# Patient Record
Sex: MALE | Race: BLACK OR AFRICAN AMERICAN | Employment: FULL TIME | URBAN - METROPOLITAN AREA
[De-identification: names, ages, dates, MRNs, and addresses within clinical notes are randomized per-mention and may not be internally consistent; named-entity substitution may affect disease eponyms.]

---

## 2024-01-08 ENCOUNTER — APPOINTMENT (OUTPATIENT)
Dept: GENERAL RADIOLOGY | Age: 69
DRG: 853 | End: 2024-01-08
Payer: COMMERCIAL

## 2024-01-08 ENCOUNTER — HOSPITAL ENCOUNTER (EMERGENCY)
Age: 69
Discharge: ANOTHER HOSPITAL | DRG: 853 | End: 2024-01-08
Attending: EMERGENCY MEDICINE
Payer: COMMERCIAL

## 2024-01-08 VITALS
HEART RATE: 101 BPM | OXYGEN SATURATION: 92 % | TEMPERATURE: 97.9 F | DIASTOLIC BLOOD PRESSURE: 67 MMHG | RESPIRATION RATE: 30 BRPM | SYSTOLIC BLOOD PRESSURE: 122 MMHG | BODY MASS INDEX: 37.58 KG/M2 | HEIGHT: 76 IN | WEIGHT: 308.64 LBS

## 2024-01-08 DIAGNOSIS — I47.10 PAROXYSMAL SUPRAVENTRICULAR TACHYCARDIA: Primary | ICD-10-CM

## 2024-01-08 DIAGNOSIS — R33.9 URINARY RETENTION: ICD-10-CM

## 2024-01-08 LAB
ALBUMIN SERPL-MCNC: 3.5 G/DL (ref 3.5–5.2)
ALBUMIN/GLOB SERPL: 0.6 {RATIO} (ref 1–2.5)
ALP SERPL-CCNC: 80 U/L (ref 40–129)
ALT SERPL-CCNC: 28 U/L (ref 5–41)
ANION GAP SERPL CALCULATED.3IONS-SCNC: 16 MMOL/L (ref 9–17)
AST SERPL-CCNC: 23 U/L
BASOPHILS # BLD: 0.12 K/UL (ref 0–0.2)
BASOPHILS NFR BLD: 1 % (ref 0–2)
BILIRUB DIRECT SERPL-MCNC: 0.2 MG/DL
BILIRUB INDIRECT SERPL-MCNC: 0.4 MG/DL (ref 0–1)
BILIRUB SERPL-MCNC: 0.6 MG/DL (ref 0.3–1.2)
BUN SERPL-MCNC: 37 MG/DL (ref 8–23)
CALCIUM SERPL-MCNC: 9.3 MG/DL (ref 8.6–10.4)
CHLORIDE SERPL-SCNC: 95 MMOL/L (ref 98–107)
CO2 SERPL-SCNC: 20 MMOL/L (ref 20–31)
CREAT SERPL-MCNC: 4.1 MG/DL (ref 0.7–1.2)
EOSINOPHIL # BLD: 0 K/UL (ref 0–0.4)
EOSINOPHILS RELATIVE PERCENT: 0 % (ref 1–4)
ERYTHROCYTE [DISTWIDTH] IN BLOOD BY AUTOMATED COUNT: 14 % (ref 12.5–15.4)
GFR SERPL CREATININE-BSD FRML MDRD: 15 ML/MIN/1.73M2
GLUCOSE SERPL-MCNC: 136 MG/DL (ref 70–99)
HCT VFR BLD AUTO: 30.5 % (ref 41–53)
HGB BLD-MCNC: 10.1 G/DL (ref 13.5–17.5)
LYMPHOCYTES NFR BLD: 0.96 K/UL (ref 1–4.8)
LYMPHOCYTES RELATIVE PERCENT: 8 % (ref 24–44)
MAGNESIUM SERPL-MCNC: 1.8 MG/DL (ref 1.6–2.6)
MCH RBC QN AUTO: 27.8 PG (ref 26–34)
MCHC RBC AUTO-ENTMCNC: 33.2 G/DL (ref 31–37)
MCV RBC AUTO: 83.7 FL (ref 80–100)
MONOCYTES NFR BLD: 0.12 K/UL (ref 0.1–0.8)
MONOCYTES NFR BLD: 1 % (ref 1–7)
MORPHOLOGY: NORMAL
NEUTROPHILS NFR BLD: 90 % (ref 36–66)
NEUTS SEG NFR BLD: 10.8 K/UL (ref 1.8–7.7)
PLATELET # BLD AUTO: 369 K/UL (ref 140–450)
PMV BLD AUTO: 7.8 FL (ref 6–12)
POTASSIUM SERPL-SCNC: 5.6 MMOL/L (ref 3.7–5.3)
PROT SERPL-MCNC: 9 G/DL (ref 6.4–8.3)
RBC # BLD AUTO: 3.64 M/UL (ref 4.5–5.9)
SODIUM SERPL-SCNC: 131 MMOL/L (ref 135–144)
WBC OTHER # BLD: 12 K/UL (ref 3.5–11)

## 2024-01-08 PROCEDURE — 96365 THER/PROPH/DIAG IV INF INIT: CPT

## 2024-01-08 PROCEDURE — 71045 X-RAY EXAM CHEST 1 VIEW: CPT

## 2024-01-08 PROCEDURE — 83735 ASSAY OF MAGNESIUM: CPT

## 2024-01-08 PROCEDURE — 6360000002 HC RX W HCPCS

## 2024-01-08 PROCEDURE — 2580000003 HC RX 258

## 2024-01-08 PROCEDURE — 87186 SC STD MICRODIL/AGAR DIL: CPT

## 2024-01-08 PROCEDURE — 80076 HEPATIC FUNCTION PANEL: CPT

## 2024-01-08 PROCEDURE — 80048 BASIC METABOLIC PNL TOTAL CA: CPT

## 2024-01-08 PROCEDURE — 99285 EMERGENCY DEPT VISIT HI MDM: CPT

## 2024-01-08 PROCEDURE — 85025 COMPLETE CBC W/AUTO DIFF WBC: CPT

## 2024-01-08 PROCEDURE — 2500000003 HC RX 250 WO HCPCS

## 2024-01-08 PROCEDURE — 93005 ELECTROCARDIOGRAM TRACING: CPT

## 2024-01-08 PROCEDURE — 6370000000 HC RX 637 (ALT 250 FOR IP)

## 2024-01-08 PROCEDURE — 81001 URINALYSIS AUTO W/SCOPE: CPT

## 2024-01-08 PROCEDURE — 36415 COLL VENOUS BLD VENIPUNCTURE: CPT

## 2024-01-08 PROCEDURE — 51798 US URINE CAPACITY MEASURE: CPT

## 2024-01-08 PROCEDURE — 96375 TX/PRO/DX INJ NEW DRUG ADDON: CPT

## 2024-01-08 PROCEDURE — 87077 CULTURE AEROBIC IDENTIFY: CPT

## 2024-01-08 RX ORDER — DEXTROSE MONOHYDRATE 25 G/50ML
25 INJECTION, SOLUTION INTRAVENOUS ONCE
Status: CANCELLED | OUTPATIENT
Start: 2024-01-08 | End: 2024-01-08

## 2024-01-08 RX ORDER — DEXTROSE MONOHYDRATE 25 G/50ML
25 INJECTION, SOLUTION INTRAVENOUS ONCE
Status: COMPLETED | OUTPATIENT
Start: 2024-01-08 | End: 2024-01-08

## 2024-01-08 RX ORDER — ADENOSINE 3 MG/ML
INJECTION, SOLUTION INTRAVENOUS
Status: COMPLETED
Start: 2024-01-08 | End: 2024-01-08

## 2024-01-08 RX ORDER — ADENOSINE 3 MG/ML
12 INJECTION, SOLUTION INTRAVENOUS ONCE
Status: DISCONTINUED | OUTPATIENT
Start: 2024-01-08 | End: 2024-01-09 | Stop reason: HOSPADM

## 2024-01-08 RX ORDER — ADENOSINE 3 MG/ML
6 INJECTION, SOLUTION INTRAVENOUS ONCE
Status: COMPLETED | OUTPATIENT
Start: 2024-01-08 | End: 2024-01-08

## 2024-01-08 RX ORDER — ADENOSINE 3 MG/ML
12 INJECTION, SOLUTION INTRAVENOUS ONCE
Status: COMPLETED | OUTPATIENT
Start: 2024-01-08 | End: 2024-01-08

## 2024-01-08 RX ADMIN — ADENOSINE 6 MG: 3 INJECTION, SOLUTION INTRAVENOUS at 21:55

## 2024-01-08 RX ADMIN — DEXTROSE MONOHYDRATE 25 G: 25 INJECTION, SOLUTION INTRAVENOUS at 21:50

## 2024-01-08 RX ADMIN — INSULIN HUMAN 10 UNITS: 100 INJECTION, SOLUTION PARENTERAL at 21:50

## 2024-01-08 RX ADMIN — ADENOSINE 12 MG: 3 INJECTION, SOLUTION INTRAVENOUS at 21:56

## 2024-01-08 RX ADMIN — CALCIUM GLUCONATE 1000 MG: 98 INJECTION, SOLUTION INTRAVENOUS at 22:30

## 2024-01-08 ASSESSMENT — ENCOUNTER SYMPTOMS
ALLERGIC/IMMUNOLOGIC NEGATIVE: 1
EYES NEGATIVE: 1
GASTROINTESTINAL NEGATIVE: 1

## 2024-01-08 ASSESSMENT — PAIN - FUNCTIONAL ASSESSMENT: PAIN_FUNCTIONAL_ASSESSMENT: NONE - DENIES PAIN

## 2024-01-09 ENCOUNTER — HOSPITAL ENCOUNTER (INPATIENT)
Age: 69
LOS: 7 days | Discharge: NURSING, REHAB OR LONG-TERM CARE | DRG: 853 | End: 2024-01-16
Attending: EMERGENCY MEDICINE | Admitting: INTERNAL MEDICINE
Payer: COMMERCIAL

## 2024-01-09 ENCOUNTER — APPOINTMENT (OUTPATIENT)
Dept: GENERAL RADIOLOGY | Age: 69
DRG: 853 | End: 2024-01-09
Payer: COMMERCIAL

## 2024-01-09 ENCOUNTER — APPOINTMENT (OUTPATIENT)
Dept: VASCULAR LAB | Age: 69
DRG: 853 | End: 2024-01-09
Payer: COMMERCIAL

## 2024-01-09 ENCOUNTER — APPOINTMENT (OUTPATIENT)
Dept: CT IMAGING | Age: 69
DRG: 853 | End: 2024-01-09
Payer: COMMERCIAL

## 2024-01-09 ENCOUNTER — ANESTHESIA EVENT (OUTPATIENT)
Dept: OPERATING ROOM | Age: 69
End: 2024-01-09
Payer: COMMERCIAL

## 2024-01-09 ENCOUNTER — OFFICE VISIT (OUTPATIENT)
Dept: OPERATING ROOM | Age: 69
End: 2024-01-09
Attending: INTERNAL MEDICINE

## 2024-01-09 ENCOUNTER — ANESTHESIA (OUTPATIENT)
Dept: OPERATING ROOM | Age: 69
End: 2024-01-09
Payer: COMMERCIAL

## 2024-01-09 DIAGNOSIS — I26.02 ACUTE SADDLE PULMONARY EMBOLISM WITH ACUTE COR PULMONALE (HCC): Primary | ICD-10-CM

## 2024-01-09 DIAGNOSIS — R33.9 URINARY RETENTION: ICD-10-CM

## 2024-01-09 DIAGNOSIS — I47.10 PAROXYSMAL SUPRAVENTRICULAR TACHYCARDIA: ICD-10-CM

## 2024-01-09 PROBLEM — I26.99 ACUTE MASSIVE PULMONARY EMBOLISM (HCC): Status: ACTIVE | Noted: 2024-01-09

## 2024-01-09 PROBLEM — N39.0 UTI (URINARY TRACT INFECTION): Status: ACTIVE | Noted: 2024-01-09

## 2024-01-09 PROBLEM — A41.4 SEPTICEMIA DUE TO KLEBSIELLA PNEUMONIAE (HCC): Status: ACTIVE | Noted: 2024-01-09

## 2024-01-09 PROBLEM — N40.0 BPH (BENIGN PROSTATIC HYPERPLASIA): Status: ACTIVE | Noted: 2024-01-09

## 2024-01-09 PROBLEM — I10 HYPERTENSION: Status: ACTIVE | Noted: 2024-01-09

## 2024-01-09 PROBLEM — D72.825 BANDEMIA: Status: ACTIVE | Noted: 2024-01-09

## 2024-01-09 PROBLEM — N13.9 OBSTRUCTIVE UROPATHY: Status: ACTIVE | Noted: 2024-01-09

## 2024-01-09 PROBLEM — N19 RENAL FAILURE SYNDROME: Status: ACTIVE | Noted: 2024-01-09

## 2024-01-09 PROBLEM — N13.30 HYDRONEPHROSIS: Status: ACTIVE | Noted: 2024-01-09

## 2024-01-09 PROBLEM — N12 PYELONEPHRITIS: Status: ACTIVE | Noted: 2024-01-09

## 2024-01-09 PROBLEM — R31.9 HEMATURIA: Status: ACTIVE | Noted: 2024-01-09

## 2024-01-09 PROBLEM — R33.8 ACUTE URINARY RETENTION: Status: ACTIVE | Noted: 2024-01-09

## 2024-01-09 LAB
ALBUMIN SERPL-MCNC: 2.9 G/DL (ref 3.5–5.2)
ALBUMIN/GLOB SERPL: 0.6 {RATIO} (ref 1–2.5)
ALP SERPL-CCNC: 72 U/L (ref 40–129)
ALT SERPL-CCNC: 24 U/L (ref 5–41)
ANION GAP SERPL CALCULATED.3IONS-SCNC: 11 MMOL/L (ref 9–17)
ANION GAP SERPL CALCULATED.3IONS-SCNC: 18 MMOL/L (ref 9–17)
ANTI-XA UNFRAC HEPARIN: 0.57 IU/L
ANTI-XA UNFRAC HEPARIN: 0.65 IU/L
ANTI-XA UNFRAC HEPARIN: <0.1 IU/L
AST SERPL-CCNC: 22 U/L
BACTERIA URNS QL MICRO: ABNORMAL
BASOPHILS # BLD: 0 K/UL (ref 0–0.2)
BASOPHILS NFR BLD: 0 % (ref 0–2)
BILIRUB SERPL-MCNC: 0.5 MG/DL (ref 0.3–1.2)
BILIRUB UR QL STRIP: NEGATIVE
BNP SERPL-MCNC: 893 PG/ML
BUN SERPL-MCNC: 39 MG/DL (ref 8–23)
BUN SERPL-MCNC: 43 MG/DL (ref 8–23)
CALCIUM SERPL-MCNC: 7.7 MG/DL (ref 8.6–10.4)
CALCIUM SERPL-MCNC: 8.9 MG/DL (ref 8.6–10.4)
CHLORIDE SERPL-SCNC: 97 MMOL/L (ref 98–107)
CHLORIDE SERPL-SCNC: 99 MMOL/L (ref 98–107)
CLARITY UR: ABNORMAL
CO2 SERPL-SCNC: 16 MMOL/L (ref 20–31)
CO2 SERPL-SCNC: 22 MMOL/L (ref 20–31)
COLOR UR: YELLOW
CREAT SERPL-MCNC: 3.6 MG/DL (ref 0.7–1.2)
CREAT SERPL-MCNC: 4.4 MG/DL (ref 0.7–1.2)
D DIMER PPP FEU-MCNC: >20 UG/ML FEU (ref 0–0.57)
EKG ATRIAL RATE: 112 BPM
EKG P AXIS: 34 DEGREES
EKG P-R INTERVAL: 212 MS
EKG Q-T INTERVAL: 314 MS
EKG QRS DURATION: 100 MS
EKG QTC CALCULATION (BAZETT): 428 MS
EKG R AXIS: -44 DEGREES
EKG T AXIS: 73 DEGREES
EKG VENTRICULAR RATE: 112 BPM
EOSINOPHIL # BLD: 0 K/UL (ref 0–0.4)
EOSINOPHILS RELATIVE PERCENT: 0 % (ref 1–4)
EPI CELLS #/AREA URNS HPF: ABNORMAL /HPF (ref 0–5)
ERYTHROCYTE [DISTWIDTH] IN BLOOD BY AUTOMATED COUNT: 13.4 % (ref 11.8–14.4)
GFR SERPL CREATININE-BSD FRML MDRD: 14 ML/MIN/1.73M2
GFR SERPL CREATININE-BSD FRML MDRD: 17 ML/MIN/1.73M2
GLUCOSE BLD-MCNC: 109 MG/DL (ref 75–110)
GLUCOSE BLD-MCNC: 130 MG/DL (ref 75–110)
GLUCOSE SERPL-MCNC: 119 MG/DL (ref 70–99)
GLUCOSE SERPL-MCNC: 64 MG/DL (ref 70–99)
GLUCOSE UR STRIP-MCNC: NEGATIVE MG/DL
HCT VFR BLD AUTO: 22.7 % (ref 40.7–50.3)
HCT VFR BLD AUTO: 26.8 % (ref 40.7–50.3)
HCT VFR BLD AUTO: 30 % (ref 40.7–50.3)
HCT VFR BLD AUTO: 32.4 % (ref 40.7–50.3)
HGB BLD-MCNC: 10.2 G/DL (ref 13–17)
HGB BLD-MCNC: 7.3 G/DL (ref 13–17)
HGB BLD-MCNC: 8.5 G/DL (ref 13–17)
HGB BLD-MCNC: 9 G/DL (ref 13–17)
HGB UR QL STRIP.AUTO: ABNORMAL
IMM GRANULOCYTES # BLD AUTO: 0 K/UL (ref 0–0.3)
IMM GRANULOCYTES NFR BLD: 0 %
INR PPP: 1.4
KETONES UR STRIP-MCNC: NEGATIVE MG/DL
LACTIC ACID, SEPSIS WHOLE BLOOD: 1.7 MMOL/L (ref 0.5–1.9)
LACTIC ACID, SEPSIS WHOLE BLOOD: 2.2 MMOL/L (ref 0.5–1.9)
LEUKOCYTE ESTERASE UR QL STRIP: ABNORMAL
LYMPHOCYTES NFR BLD: 0.52 K/UL (ref 1–4.8)
LYMPHOCYTES RELATIVE PERCENT: 4 % (ref 24–44)
MAGNESIUM SERPL-MCNC: 2 MG/DL (ref 1.6–2.6)
MCH RBC QN AUTO: 27.3 PG (ref 25.2–33.5)
MCHC RBC AUTO-ENTMCNC: 31.5 G/DL (ref 28.4–34.8)
MCV RBC AUTO: 86.6 FL (ref 82.6–102.9)
MICROORGANISM SPEC CULT: NORMAL
MONOCYTES NFR BLD: 1.82 K/UL (ref 0.1–0.8)
MONOCYTES NFR BLD: 14 % (ref 1–7)
MORPHOLOGY: NORMAL
NEUTROPHILS NFR BLD: 82 % (ref 36–66)
NEUTS SEG NFR BLD: 10.66 K/UL (ref 1.8–7.7)
NITRITE UR QL STRIP: NEGATIVE
NRBC BLD-RTO: 0 PER 100 WBC
PARTIAL THROMBOPLASTIN TIME: 29.4 SEC (ref 23–36.5)
PH UR STRIP: 6 [PH] (ref 5–8)
PLATELET # BLD AUTO: ABNORMAL K/UL (ref 138–453)
PLATELET, FLUORESCENCE: ABNORMAL K/UL (ref 138–453)
POTASSIUM SERPL-SCNC: 4.8 MMOL/L (ref 3.7–5.3)
POTASSIUM SERPL-SCNC: 4.8 MMOL/L (ref 3.7–5.3)
PROT SERPL-MCNC: 8 G/DL (ref 6.4–8.3)
PROT UR STRIP-MCNC: ABNORMAL MG/DL
PROTHROMBIN TIME: 16.6 SEC (ref 11.7–14.9)
RBC # BLD AUTO: 3.74 M/UL (ref 4.21–5.77)
RBC #/AREA URNS HPF: ABNORMAL /HPF (ref 0–2)
SODIUM SERPL-SCNC: 131 MMOL/L (ref 135–144)
SODIUM SERPL-SCNC: 132 MMOL/L (ref 135–144)
SP GR UR STRIP: 1.01 (ref 1–1.03)
SPECIMEN DESCRIPTION: NORMAL
TROPONIN I SERPL HS-MCNC: 67 NG/L (ref 0–22)
TROPONIN I SERPL HS-MCNC: 72 NG/L (ref 0–22)
TROPONIN I SERPL HS-MCNC: 84 NG/L (ref 0–22)
TROPONIN I SERPL HS-MCNC: 87 NG/L (ref 0–22)
UROBILINOGEN UR STRIP-ACNC: NORMAL EU/DL (ref 0–1)
WBC #/AREA URNS HPF: ABNORMAL /HPF (ref 0–5)
WBC OTHER # BLD: 13 K/UL (ref 3.5–11.3)

## 2024-01-09 PROCEDURE — 83605 ASSAY OF LACTIC ACID: CPT

## 2024-01-09 PROCEDURE — 2580000003 HC RX 258: Performed by: ANESTHESIOLOGY

## 2024-01-09 PROCEDURE — 83880 ASSAY OF NATRIURETIC PEPTIDE: CPT

## 2024-01-09 PROCEDURE — 6370000000 HC RX 637 (ALT 250 FOR IP): Performed by: STUDENT IN AN ORGANIZED HEALTH CARE EDUCATION/TRAINING PROGRAM

## 2024-01-09 PROCEDURE — 2000000000 HC ICU R&B

## 2024-01-09 PROCEDURE — 6360000002 HC RX W HCPCS: Performed by: STUDENT IN AN ORGANIZED HEALTH CARE EDUCATION/TRAINING PROGRAM

## 2024-01-09 PROCEDURE — 87154 CUL TYP ID BLD PTHGN 6+ TRGT: CPT

## 2024-01-09 PROCEDURE — 85379 FIBRIN DEGRADATION QUANT: CPT

## 2024-01-09 PROCEDURE — 84484 ASSAY OF TROPONIN QUANT: CPT

## 2024-01-09 PROCEDURE — 02CR3ZZ EXTIRPATION OF MATTER FROM LEFT PULMONARY ARTERY, PERCUTANEOUS APPROACH: ICD-10-PCS | Performed by: SURGERY

## 2024-01-09 PROCEDURE — 93970 EXTREMITY STUDY: CPT | Performed by: STUDENT IN AN ORGANIZED HEALTH CARE EDUCATION/TRAINING PROGRAM

## 2024-01-09 PROCEDURE — 87040 BLOOD CULTURE FOR BACTERIA: CPT

## 2024-01-09 PROCEDURE — 74177 CT ABD & PELVIS W/CONTRAST: CPT

## 2024-01-09 PROCEDURE — 6360000002 HC RX W HCPCS: Performed by: NURSE ANESTHETIST, CERTIFIED REGISTERED

## 2024-01-09 PROCEDURE — 86920 COMPATIBILITY TEST SPIN: CPT

## 2024-01-09 PROCEDURE — 2580000003 HC RX 258

## 2024-01-09 PROCEDURE — C1769 GUIDE WIRE: HCPCS | Performed by: SURGERY

## 2024-01-09 PROCEDURE — C1725 CATH, TRANSLUMIN NON-LASER: HCPCS | Performed by: SURGERY

## 2024-01-09 PROCEDURE — 3600000007 HC SURGERY HYBRID BASE: Performed by: SURGERY

## 2024-01-09 PROCEDURE — 99222 1ST HOSP IP/OBS MODERATE 55: CPT | Performed by: INTERNAL MEDICINE

## 2024-01-09 PROCEDURE — 85014 HEMATOCRIT: CPT

## 2024-01-09 PROCEDURE — 51700 IRRIGATION OF BLADDER: CPT

## 2024-01-09 PROCEDURE — 6360000004 HC RX CONTRAST MEDICATION: Performed by: STUDENT IN AN ORGANIZED HEALTH CARE EDUCATION/TRAINING PROGRAM

## 2024-01-09 PROCEDURE — 96365 THER/PROPH/DIAG IV INF INIT: CPT

## 2024-01-09 PROCEDURE — 87186 SC STD MICRODIL/AGAR DIL: CPT

## 2024-01-09 PROCEDURE — 2580000003 HC RX 258: Performed by: SURGERY

## 2024-01-09 PROCEDURE — 82947 ASSAY GLUCOSE BLOOD QUANT: CPT

## 2024-01-09 PROCEDURE — C1894 INTRO/SHEATH, NON-LASER: HCPCS | Performed by: SURGERY

## 2024-01-09 PROCEDURE — 96375 TX/PRO/DX INJ NEW DRUG ADDON: CPT

## 2024-01-09 PROCEDURE — 85055 RETICULATED PLATELET ASSAY: CPT

## 2024-01-09 PROCEDURE — 2580000003 HC RX 258: Performed by: INTERNAL MEDICINE

## 2024-01-09 PROCEDURE — 6360000004 HC RX CONTRAST MEDICATION: Performed by: SURGERY

## 2024-01-09 PROCEDURE — 83735 ASSAY OF MAGNESIUM: CPT

## 2024-01-09 PROCEDURE — 99285 EMERGENCY DEPT VISIT HI MDM: CPT

## 2024-01-09 PROCEDURE — 3600000017 HC SURGERY HYBRID ADDL 15MIN: Performed by: SURGERY

## 2024-01-09 PROCEDURE — 2500000003 HC RX 250 WO HCPCS: Performed by: INTERNAL MEDICINE

## 2024-01-09 PROCEDURE — 6360000002 HC RX W HCPCS

## 2024-01-09 PROCEDURE — 2580000003 HC RX 258: Performed by: STUDENT IN AN ORGANIZED HEALTH CARE EDUCATION/TRAINING PROGRAM

## 2024-01-09 PROCEDURE — 36415 COLL VENOUS BLD VENIPUNCTURE: CPT

## 2024-01-09 PROCEDURE — 85520 HEPARIN ASSAY: CPT

## 2024-01-09 PROCEDURE — C1760 CLOSURE DEV, VASC: HCPCS | Performed by: SURGERY

## 2024-01-09 PROCEDURE — 99223 1ST HOSP IP/OBS HIGH 75: CPT | Performed by: INTERNAL MEDICINE

## 2024-01-09 PROCEDURE — 7100000011 HC PHASE II RECOVERY - ADDTL 15 MIN

## 2024-01-09 PROCEDURE — 86900 BLOOD TYPING SEROLOGIC ABO: CPT

## 2024-01-09 PROCEDURE — 2500000003 HC RX 250 WO HCPCS

## 2024-01-09 PROCEDURE — 87641 MR-STAPH DNA AMP PROBE: CPT

## 2024-01-09 PROCEDURE — 2580000003 HC RX 258: Performed by: NURSE ANESTHETIST, CERTIFIED REGISTERED

## 2024-01-09 PROCEDURE — 86850 RBC ANTIBODY SCREEN: CPT

## 2024-01-09 PROCEDURE — 87205 SMEAR GRAM STAIN: CPT

## 2024-01-09 PROCEDURE — 93970 EXTREMITY STUDY: CPT

## 2024-01-09 PROCEDURE — 2500000003 HC RX 250 WO HCPCS: Performed by: SURGERY

## 2024-01-09 PROCEDURE — A4217 STERILE WATER/SALINE, 500 ML: HCPCS | Performed by: SURGERY

## 2024-01-09 PROCEDURE — 99291 CRITICAL CARE FIRST HOUR: CPT | Performed by: INTERNAL MEDICINE

## 2024-01-09 PROCEDURE — 2580000003 HC RX 258: Performed by: PHYSICIAN ASSISTANT

## 2024-01-09 PROCEDURE — 7100000010 HC PHASE II RECOVERY - FIRST 15 MIN

## 2024-01-09 PROCEDURE — 87086 URINE CULTURE/COLONY COUNT: CPT

## 2024-01-09 PROCEDURE — 71045 X-RAY EXAM CHEST 1 VIEW: CPT

## 2024-01-09 PROCEDURE — C9113 INJ PANTOPRAZOLE SODIUM, VIA: HCPCS | Performed by: STUDENT IN AN ORGANIZED HEALTH CARE EDUCATION/TRAINING PROGRAM

## 2024-01-09 PROCEDURE — 2709999900 HC NON-CHARGEABLE SUPPLY: Performed by: SURGERY

## 2024-01-09 PROCEDURE — 87077 CULTURE AEROBIC IDENTIFY: CPT

## 2024-01-09 PROCEDURE — 85025 COMPLETE CBC W/AUTO DIFF WBC: CPT

## 2024-01-09 PROCEDURE — 3700000000 HC ANESTHESIA ATTENDED CARE: Performed by: SURGERY

## 2024-01-09 PROCEDURE — 80048 BASIC METABOLIC PNL TOTAL CA: CPT

## 2024-01-09 PROCEDURE — 86901 BLOOD TYPING SEROLOGIC RH(D): CPT

## 2024-01-09 PROCEDURE — 71260 CT THORAX DX C+: CPT

## 2024-01-09 PROCEDURE — 2500000003 HC RX 250 WO HCPCS: Performed by: STUDENT IN AN ORGANIZED HEALTH CARE EDUCATION/TRAINING PROGRAM

## 2024-01-09 PROCEDURE — 85610 PROTHROMBIN TIME: CPT

## 2024-01-09 PROCEDURE — 85730 THROMBOPLASTIN TIME PARTIAL: CPT

## 2024-01-09 PROCEDURE — 6360000002 HC RX W HCPCS: Performed by: SURGERY

## 2024-01-09 PROCEDURE — 02CR4ZZ EXTIRPATION OF MATTER FROM LEFT PULMONARY ARTERY, PERCUTANEOUS ENDOSCOPIC APPROACH: ICD-10-PCS | Performed by: SURGERY

## 2024-01-09 PROCEDURE — 80053 COMPREHEN METABOLIC PANEL: CPT

## 2024-01-09 PROCEDURE — 85018 HEMOGLOBIN: CPT

## 2024-01-09 PROCEDURE — C1892 INTRO/SHEATH,FIXED,PEEL-AWAY: HCPCS | Performed by: SURGERY

## 2024-01-09 PROCEDURE — 3700000001 HC ADD 15 MINUTES (ANESTHESIA): Performed by: SURGERY

## 2024-01-09 PROCEDURE — 51702 INSERT TEMP BLADDER CATH: CPT

## 2024-01-09 RX ORDER — ONDANSETRON 2 MG/ML
4 INJECTION INTRAMUSCULAR; INTRAVENOUS
Status: ACTIVE | OUTPATIENT
Start: 2024-01-09 | End: 2024-01-10

## 2024-01-09 RX ORDER — FENTANYL CITRATE 50 UG/ML
INJECTION, SOLUTION INTRAMUSCULAR; INTRAVENOUS PRN
Status: DISCONTINUED | OUTPATIENT
Start: 2024-01-09 | End: 2024-01-09 | Stop reason: SDUPTHER

## 2024-01-09 RX ORDER — HEPARIN SODIUM 1000 [USP'U]/ML
5000 INJECTION, SOLUTION INTRAVENOUS; SUBCUTANEOUS PRN
Status: DISCONTINUED | OUTPATIENT
Start: 2024-01-09 | End: 2024-01-14

## 2024-01-09 RX ORDER — NOREPINEPHRINE BITARTRATE 0.06 MG/ML
INJECTION, SOLUTION INTRAVENOUS
Status: DISPENSED
Start: 2024-01-09 | End: 2024-01-10

## 2024-01-09 RX ORDER — SODIUM CHLORIDE 0.9 % (FLUSH) 0.9 %
5-40 SYRINGE (ML) INJECTION PRN
Status: DISCONTINUED | OUTPATIENT
Start: 2024-01-09 | End: 2024-01-16 | Stop reason: HOSPADM

## 2024-01-09 RX ORDER — SODIUM CHLORIDE 0.9 % (FLUSH) 0.9 %
5-40 SYRINGE (ML) INJECTION EVERY 12 HOURS SCHEDULED
Status: DISCONTINUED | OUTPATIENT
Start: 2024-01-09 | End: 2024-01-16 | Stop reason: HOSPADM

## 2024-01-09 RX ORDER — ENOXAPARIN SODIUM 100 MG/ML
30 INJECTION SUBCUTANEOUS DAILY
Status: CANCELLED | OUTPATIENT
Start: 2024-01-09

## 2024-01-09 RX ORDER — ONDANSETRON 4 MG/1
4 TABLET, ORALLY DISINTEGRATING ORAL EVERY 8 HOURS PRN
Status: DISCONTINUED | OUTPATIENT
Start: 2024-01-09 | End: 2024-01-16 | Stop reason: HOSPADM

## 2024-01-09 RX ORDER — FENTANYL CITRATE 50 UG/ML
25 INJECTION, SOLUTION INTRAMUSCULAR; INTRAVENOUS EVERY 5 MIN PRN
Status: DISCONTINUED | OUTPATIENT
Start: 2024-01-09 | End: 2024-01-09

## 2024-01-09 RX ORDER — LIDOCAINE HYDROCHLORIDE 10 MG/ML
INJECTION, SOLUTION INFILTRATION; PERINEURAL
Status: DISPENSED
Start: 2024-01-09 | End: 2024-01-09

## 2024-01-09 RX ORDER — LIDOCAINE HYDROCHLORIDE 20 MG/ML
JELLY TOPICAL
Status: DISPENSED | OUTPATIENT
Start: 2024-01-09 | End: 2024-01-10

## 2024-01-09 RX ORDER — FENTANYL CITRATE 50 UG/ML
25 INJECTION, SOLUTION INTRAMUSCULAR; INTRAVENOUS
Status: DISCONTINUED | OUTPATIENT
Start: 2024-01-09 | End: 2024-01-16 | Stop reason: HOSPADM

## 2024-01-09 RX ORDER — PHENYLEPHRINE HCL IN 0.9% NACL 50MG/250ML
PLASTIC BAG, INJECTION (ML) INTRAVENOUS
Status: DISPENSED
Start: 2024-01-09 | End: 2024-01-10

## 2024-01-09 RX ORDER — SODIUM CHLORIDE 9 MG/ML
INJECTION, SOLUTION INTRAVENOUS PRN
Status: DISCONTINUED | OUTPATIENT
Start: 2024-01-09 | End: 2024-01-16 | Stop reason: HOSPADM

## 2024-01-09 RX ORDER — MIDAZOLAM HYDROCHLORIDE 1 MG/ML
INJECTION INTRAMUSCULAR; INTRAVENOUS PRN
Status: DISCONTINUED | OUTPATIENT
Start: 2024-01-09 | End: 2024-01-09 | Stop reason: SDUPTHER

## 2024-01-09 RX ORDER — DIPHENHYDRAMINE HYDROCHLORIDE 50 MG/ML
12.5 INJECTION INTRAMUSCULAR; INTRAVENOUS
Status: ACTIVE | OUTPATIENT
Start: 2024-01-09 | End: 2024-01-10

## 2024-01-09 RX ORDER — NOREPINEPHRINE BITARTRATE 0.06 MG/ML
1-100 INJECTION, SOLUTION INTRAVENOUS CONTINUOUS
Status: DISCONTINUED | OUTPATIENT
Start: 2024-01-09 | End: 2024-01-10

## 2024-01-09 RX ORDER — SODIUM CHLORIDE 9 MG/ML
INJECTION, SOLUTION INTRAVENOUS PRN
Status: DISCONTINUED | OUTPATIENT
Start: 2024-01-09 | End: 2024-01-10

## 2024-01-09 RX ORDER — ONDANSETRON 2 MG/ML
4 INJECTION INTRAMUSCULAR; INTRAVENOUS EVERY 6 HOURS PRN
Status: DISCONTINUED | OUTPATIENT
Start: 2024-01-09 | End: 2024-01-16 | Stop reason: HOSPADM

## 2024-01-09 RX ORDER — ACETAMINOPHEN 325 MG/1
650 TABLET ORAL EVERY 6 HOURS PRN
Status: DISCONTINUED | OUTPATIENT
Start: 2024-01-09 | End: 2024-01-16 | Stop reason: HOSPADM

## 2024-01-09 RX ORDER — HEPARIN SODIUM 1000 [USP'U]/ML
10000 INJECTION, SOLUTION INTRAVENOUS; SUBCUTANEOUS ONCE
Status: COMPLETED | OUTPATIENT
Start: 2024-01-09 | End: 2024-01-09

## 2024-01-09 RX ORDER — NOREPINEPHRINE BITARTRATE 0.06 MG/ML
1-100 INJECTION, SOLUTION INTRAVENOUS CONTINUOUS
Status: DISCONTINUED | OUTPATIENT
Start: 2024-01-09 | End: 2024-01-10 | Stop reason: SDUPTHER

## 2024-01-09 RX ORDER — HEPARIN SODIUM 10000 [USP'U]/100ML
5-30 INJECTION, SOLUTION INTRAVENOUS CONTINUOUS
Status: DISCONTINUED | OUTPATIENT
Start: 2024-01-09 | End: 2024-01-14

## 2024-01-09 RX ORDER — FENTANYL CITRATE 50 UG/ML
50 INJECTION, SOLUTION INTRAMUSCULAR; INTRAVENOUS EVERY 5 MIN PRN
Status: DISCONTINUED | OUTPATIENT
Start: 2024-01-09 | End: 2024-01-09

## 2024-01-09 RX ORDER — DEXTROSE MONOHYDRATE 25 G/50ML
25 INJECTION, SOLUTION INTRAVENOUS ONCE
Status: COMPLETED | OUTPATIENT
Start: 2024-01-09 | End: 2024-01-09

## 2024-01-09 RX ORDER — PROPOFOL 10 MG/ML
INJECTION, EMULSION INTRAVENOUS
Status: DISPENSED
Start: 2024-01-09 | End: 2024-01-10

## 2024-01-09 RX ORDER — 0.9 % SODIUM CHLORIDE 0.9 %
1000 INTRAVENOUS SOLUTION INTRAVENOUS ONCE
Status: COMPLETED | OUTPATIENT
Start: 2024-01-09 | End: 2024-01-09

## 2024-01-09 RX ORDER — POLYETHYLENE GLYCOL 3350 17 G/17G
17 POWDER, FOR SOLUTION ORAL DAILY PRN
Status: DISCONTINUED | OUTPATIENT
Start: 2024-01-09 | End: 2024-01-16 | Stop reason: HOSPADM

## 2024-01-09 RX ORDER — ACETAMINOPHEN 650 MG/1
650 SUPPOSITORY RECTAL EVERY 6 HOURS PRN
Status: DISCONTINUED | OUTPATIENT
Start: 2024-01-09 | End: 2024-01-16 | Stop reason: HOSPADM

## 2024-01-09 RX ORDER — SODIUM CHLORIDE 9 MG/ML
INJECTION, SOLUTION INTRAVENOUS CONTINUOUS PRN
Status: DISCONTINUED | OUTPATIENT
Start: 2024-01-09 | End: 2024-01-09 | Stop reason: SDUPTHER

## 2024-01-09 RX ORDER — IODIXANOL 320 MG/ML
INJECTION, SOLUTION INTRAVASCULAR PRN
Status: DISCONTINUED | OUTPATIENT
Start: 2024-01-09 | End: 2024-01-09 | Stop reason: ALTCHOICE

## 2024-01-09 RX ORDER — LIDOCAINE HYDROCHLORIDE 10 MG/ML
INJECTION, SOLUTION EPIDURAL; INFILTRATION; INTRACAUDAL; PERINEURAL PRN
Status: DISCONTINUED | OUTPATIENT
Start: 2024-01-09 | End: 2024-01-09 | Stop reason: ALTCHOICE

## 2024-01-09 RX ORDER — MAGNESIUM HYDROXIDE 1200 MG/15ML
3000 LIQUID ORAL CONTINUOUS
Status: DISCONTINUED | OUTPATIENT
Start: 2024-01-09 | End: 2024-01-16 | Stop reason: HOSPADM

## 2024-01-09 RX ORDER — IODIXANOL 320 MG/ML
INJECTION, SOLUTION INTRAVASCULAR
Status: DISPENSED
Start: 2024-01-09 | End: 2024-01-09

## 2024-01-09 RX ORDER — HEPARIN SODIUM 1000 [USP'U]/ML
10000 INJECTION, SOLUTION INTRAVENOUS; SUBCUTANEOUS PRN
Status: DISCONTINUED | OUTPATIENT
Start: 2024-01-09 | End: 2024-01-14

## 2024-01-09 RX ADMIN — Medication 1000 MG: at 08:59

## 2024-01-09 RX ADMIN — SODIUM CHLORIDE 3000 ML: 900 SOLUTION EXTRACORPOREAL at 21:20

## 2024-01-09 RX ADMIN — HEPARIN SODIUM 10000 UNITS: 1000 INJECTION INTRAVENOUS; SUBCUTANEOUS at 03:24

## 2024-01-09 RX ADMIN — FENTANYL CITRATE 25 MCG: 50 INJECTION, SOLUTION INTRAMUSCULAR; INTRAVENOUS at 14:34

## 2024-01-09 RX ADMIN — SODIUM CHLORIDE, PRESERVATIVE FREE 10 ML: 5 INJECTION INTRAVENOUS at 21:23

## 2024-01-09 RX ADMIN — Medication 5 MCG/MIN: at 21:16

## 2024-01-09 RX ADMIN — ACETAMINOPHEN 650 MG: 325 TABLET ORAL at 16:14

## 2024-01-09 RX ADMIN — SODIUM CHLORIDE 3000 ML: 900 SOLUTION EXTRACORPOREAL at 17:53

## 2024-01-09 RX ADMIN — FENTANYL CITRATE 50 MCG: 50 INJECTION, SOLUTION INTRAMUSCULAR; INTRAVENOUS at 06:53

## 2024-01-09 RX ADMIN — SODIUM CHLORIDE 3000 ML: 900 SOLUTION EXTRACORPOREAL at 15:30

## 2024-01-09 RX ADMIN — FENTANYL CITRATE 25 MCG: 50 INJECTION, SOLUTION INTRAMUSCULAR; INTRAVENOUS at 07:34

## 2024-01-09 RX ADMIN — SODIUM CHLORIDE 3000 ML: 900 SOLUTION EXTRACORPOREAL at 23:26

## 2024-01-09 RX ADMIN — Medication 1000 MG: at 12:25

## 2024-01-09 RX ADMIN — SODIUM CHLORIDE, PRESERVATIVE FREE 40 MG: 5 INJECTION INTRAVENOUS at 08:59

## 2024-01-09 RX ADMIN — SODIUM CHLORIDE 3000 ML: 900 SOLUTION EXTRACORPOREAL at 16:26

## 2024-01-09 RX ADMIN — Medication 2000 MG: at 01:37

## 2024-01-09 RX ADMIN — MIDAZOLAM 1 MG: 1 INJECTION INTRAMUSCULAR; INTRAVENOUS at 07:05

## 2024-01-09 RX ADMIN — SODIUM CHLORIDE: 9 INJECTION, SOLUTION INTRAVENOUS at 15:20

## 2024-01-09 RX ADMIN — FENTANYL CITRATE 25 MCG: 50 INJECTION, SOLUTION INTRAMUSCULAR; INTRAVENOUS at 07:52

## 2024-01-09 RX ADMIN — DEXTROSE MONOHYDRATE 25 G: 25 INJECTION, SOLUTION INTRAVENOUS at 01:33

## 2024-01-09 RX ADMIN — SODIUM CHLORIDE 3000 ML: 900 SOLUTION EXTRACORPOREAL at 16:25

## 2024-01-09 RX ADMIN — MIDAZOLAM 1 MG: 1 INJECTION INTRAMUSCULAR; INTRAVENOUS at 06:53

## 2024-01-09 RX ADMIN — SODIUM CHLORIDE 3000 ML: 900 SOLUTION EXTRACORPOREAL at 20:38

## 2024-01-09 RX ADMIN — SODIUM BICARBONATE: 84 INJECTION, SOLUTION INTRAVENOUS at 20:47

## 2024-01-09 RX ADMIN — SODIUM BICARBONATE: 84 INJECTION, SOLUTION INTRAVENOUS at 12:08

## 2024-01-09 RX ADMIN — MEROPENEM 1000 MG: 1 INJECTION, POWDER, FOR SOLUTION INTRAVENOUS at 15:22

## 2024-01-09 RX ADMIN — SODIUM CHLORIDE 3000 ML: 900 SOLUTION EXTRACORPOREAL at 17:52

## 2024-01-09 RX ADMIN — SODIUM CHLORIDE 3000 ML: 900 SOLUTION EXTRACORPOREAL at 15:49

## 2024-01-09 RX ADMIN — SODIUM CHLORIDE: 9 INJECTION, SOLUTION INTRAVENOUS at 06:52

## 2024-01-09 RX ADMIN — HEPARIN SODIUM 14 UNITS/KG/HR: 10000 INJECTION, SOLUTION INTRAVENOUS at 15:24

## 2024-01-09 RX ADMIN — SODIUM CHLORIDE 1000 ML: 9 INJECTION, SOLUTION INTRAVENOUS at 17:21

## 2024-01-09 RX ADMIN — HEPARIN SODIUM 14 UNITS/KG/HR: 10000 INJECTION, SOLUTION INTRAVENOUS at 03:28

## 2024-01-09 RX ADMIN — IOPAMIDOL 75 ML: 755 INJECTION, SOLUTION INTRAVENOUS at 02:50

## 2024-01-09 ASSESSMENT — PAIN SCALES - GENERAL
PAINLEVEL_OUTOF10: 10
PAINLEVEL_OUTOF10: 9
PAINLEVEL_OUTOF10: 0
PAINLEVEL_OUTOF10: 9

## 2024-01-09 ASSESSMENT — PAIN DESCRIPTION - ORIENTATION
ORIENTATION: RIGHT
ORIENTATION: RIGHT;OUTER
ORIENTATION: RIGHT

## 2024-01-09 ASSESSMENT — PAIN DESCRIPTION - DESCRIPTORS
DESCRIPTORS: BURNING;DISCOMFORT
DESCRIPTORS: BURNING;DISCOMFORT

## 2024-01-09 ASSESSMENT — PAIN - FUNCTIONAL ASSESSMENT: PAIN_FUNCTIONAL_ASSESSMENT: NONE - DENIES PAIN

## 2024-01-09 ASSESSMENT — PAIN DESCRIPTION - LOCATION
LOCATION: LEG

## 2024-01-09 NOTE — ANESTHESIA POSTPROCEDURE EVALUATION
Department of Anesthesiology  Postprocedure Note    Patient: Jose Maria Ge  MRN: [id number]  YOB: 1955  Date of evaluation: 1/9/2024    Procedure Summary       Date: 01/09/24 Room / Location: [place] [address] / [facility]    Anesthesia Start: 0652 Anesthesia Stop: 0833    Procedure: MECHANICAL THROMBECTOMY OF BILATERAL PULMONARY EMBOLISM'S VIA PENUMBRA (Right: Groin) Diagnosis:       Acute saddle pulmonary embolism, unspecified whether acute cor pulmonale present (HCC)      (Acute saddle pulmonary embolism, unspecified whether acute cor pulmonale present (HCC) [I26.92])    Surgeons: Aftab Leyva MD Responsible Provider: Art Barnett MD    Anesthesia Type: MAC ASA Status: 4 - Emergent            Anesthesia Type: No value filed.    Julieta Phase I: Julieta Score: 10    Julieta Phase II:      Anesthesia Post Evaluation    Patient location during evaluation: PACU  Patient participation: complete - patient participated  Level of consciousness: awake  Pain score: 1  Airway patency: patent  Nausea & Vomiting: no nausea and no vomiting  Cardiovascular status: blood pressure returned to baseline and hemodynamically stable  Respiratory status: acceptable  Hydration status: euvolemic  Pain management: adequate    No notable events documented.

## 2024-01-09 NOTE — CONSENT
Informed Consent for Blood Component Transfusion Note    I have discussed with the patient the rationale for blood component transfusion; its benefits in treating or preventing fatigue, organ damage, or death; and its risk which includes mild transfusion reactions, rare risk of blood borne infection, or more serious but rare reactions. I have discussed the alternatives to transfusion, including the risk and consequences of not receiving transfusion. The patient had an opportunity to ask questions and had agreed to proceed with transfusion of blood components.    Electronically signed by Aftab Leyva MD on 1/9/24 at 6:40 AM EST

## 2024-01-09 NOTE — ED PROVIDER NOTES
[facility]     Emergency Department     Faculty Attestation    I performed a history and physical examination of the patient and discussed management with the resident. I have reviewed and agree with the resident’s findings including all diagnostic interpretations, and treatment plans as written. Any areas of disagreement are noted on the chart. I was personally present for the key portions of any procedures. I have documented in the chart those procedures where I was not present during the key portions. I have reviewed the emergency nurses triage note. I agree with the chief complaint, past medical history, past surgical history, allergies, medications, social and family history as documented unless otherwise noted below. Documentation of the HPI, Physical Exam and Medical Decision Making performed by elisabeth is based on my personal performance of the HPI, PE and MDM. For Physician Assistant/ Nurse Practitioner cases/documentation I have personally evaluated this patient and have completed at least one if not all key elements of the E/M (history, physical exam, and MDM). Additional findings are as noted.    Note Started: 12:16 AM EST     69 yo M tachycardic, cp, transferred for urinary retention, [place] unable to place keys, transferred for urology consultpt converted out of svt with adenocard, no cp, no nausea at this time,   PE Tachy, gcs 15 abdomen protuberant, bilateral lower quadrant tenderness, no rebound, no guarding, right lower extremity markedly enlarged, dorsal pedal pulses =    Urology placed keys, ct > large pe, heparin, vascular consult,   Icu admit    EKG Interpretation    Interpreted by me  Sinus tachycardia, heart rate 117, no ST elevation, left axis, QT corrected 443    CRITICAL CARE: There was a high probability of clinically significant/life threatening deterioration in this patient's condition which required my urgent intervention.

## 2024-01-09 NOTE — ED PROVIDER NOTES
[facility] ED  Emergency Department Encounter  Emergency Medicine Resident     Pt Name:Jose Maria Ge  MRN: [id number]  Birthdate 1955  Date of evaluation: 1/9/24  PCP:  No primary care provider on file.  Note Started: 12:11 AM EST    CHIEF COMPLAINT       Chief Complaint   Patient presents with    Tachycardia    Chest Pain     Keys catheter problem       HISTORY OF PRESENT ILLNESS  (Location/Symptom, Timing/Onset, Context/Setting, Quality, Duration, Modifying Factors, Severity.)      Jose Maria Ge is a 68 y.o. male who presents with shortness of breath and difficulty urinating. Patient presents as a transfer from [facility] facility. Patient was seen at [facility] facility for urinary retention, nursing facility at which patient is staying attempted keys placement after bladder scan showing greater than 900 ml but was unsuccessful. At [facility] facility, unsuccessful attempt at keys placement. Patient then went into SVT and required a dose of 6 mg and a dose of 12 mg adenosine, at which time he converted to sinus rhythm, was given calcium gluconate and was transferred to our facility for further evaluation. He endorses some generalized abdominal pain and shortness of breath as well as a cough productive of some clear mucus. Patient is poor historian, uncertain of much of his medical history. He states he has had swelling and redness in his right lower extremity since 1994. He denies chest pain, palpitations, abdominal pain, nausea, vomiting, diarrhea, fevers, chills, flank pain, hematuria, testicular pain, syncope, lightheadedness, numbness, tingling, weakness.     PAST MEDICAL / SURGICAL / SOCIAL / FAMILY HISTORY      has no past medical history on file.       has no past surgical history on file.      Social History     Socioeconomic History    Marital status: Single     Spouse name: Not on file    Number of children: Not on file    Years of education: Not on file    Highest education level:

## 2024-01-09 NOTE — CARE COORDINATION
Case Management Assessment  Initial Evaluation    Date/Time of Evaluation: 1/9/2024 4:17 PM  Assessment Completed by: MELANIE HAYNES RN    If patient is discharged prior to next notation, then this note serves as note for discharge by case management.    Patient Name: Jose Maria Ge                   YOB: 1955  Diagnosis: Paroxysmal supraventricular tachycardia [I47.10]  Urinary retention [R33.9]  Acute massive pulmonary embolism (HCC) [I26.99]  Acute saddle pulmonary embolism with acute cor pulmonale (HCC) [I26.02]                   Date / Time: 1/9/2024 12:09 AM    Patient Admission Status: Inpatient   Readmission Risk (Low < 19, Mod (19-27), High > 27): Readmission Risk Score: 15.9    Current PCP: No primary care provider on file.  PCP verified by CM? (P) Yes    Chart Reviewed: Yes      History Provided by: Patient  Patient Orientation: Alert and Oriented    Patient Cognition: Alert    Hospitalization in the last 30 days (Readmission):  No    If yes, Readmission Assessment in CM Navigator will be completed.    Advance Directives:      Code Status: Full Code   Patient's Primary Decision Maker is: Legal Next of Kin      Discharge Planning:    Patient lives with: (P) Alone Type of Home: (P) Apartment  Primary Care Giver: Self  Patient Support Systems include: Friends/Neighbors   Current Financial resources:    Current community resources:    Current services prior to admission: (P) None            Current DME:              Type of Home Care services:  (P) None    ADLS  Prior functional level: (P) Independent in ADLs/IADLs  Current functional level: (P) Assistance with the following: (tbd)    PT AM-PAC:   /24  OT AM-PAC:   /24    Family can provide assistance at DC: (P) No  Would you like Case Management to discuss the discharge plan with any other family members/significant others, and if so, who? (P) No  Plans to Return to Present Housing: (P) Unknown at present  Other Identified Issues/Barriers to

## 2024-01-09 NOTE — ED NOTES
1955        Had Staley catheter come out.    Unable to replace.    Dr. Cardoso with urology aware would like ER to ER transfer and for urology residents to place Staley at bedside

## 2024-01-09 NOTE — ED NOTES
Writer tried placing keys catheter. Catheter advanced all the way with no resistance. No urine returned. Patient reported pain with last 4 inches of catheter insertion. Writer pulled out catheter and blood is noted in catheter tube.

## 2024-01-09 NOTE — ED PROVIDER NOTES
[facility] Emergency Department    [address]  [place] [address]  Phone: [phone]  Fax: [phone]  Emergency Department  Faculty Attestation    I performed a history and physical examination of the patient and discussed management with the mid level provider. I reviewed the mid level provider's note and agree with the documented findings and plan of care. Any areas of disagreement are noted on the chart. I was personally present for the key portions of any procedures. I have documented in the chart those procedures where I was not present during the key portions. I have reviewed the emergency nurses triage note. I agree with the chief complaint, past medical history, past surgical history, allergies, medications, social and family history as documented unless otherwise noted below. Documentation of the HPI, Physical Exam and Medical Decision Making performed by medical students or scribes is based on my personal performance of the HPI, PE and MDM. For Physician Assistant/ Nurse Practitioner cases/documentation I have personally evaluated this patient and have completed at least one if not all key elements of the E/M (history, physical exam, and MDM). Additional findings are as noted.      Primary Care Physician:  No primary care provider on file.    CHIEF COMPLAINT       Chief Complaint   Patient presents with    Urinary Retention     Urinary retention.  900ml reported bladder volume.  Nurse unable to pass catheter.  Sent to ER for keys cath.        RECENT VITALS:   Temp: 97.9 °F (36.6 °C),  Pulse: (!) 101, Respirations: 30, BP: 122/67    LABS:  Labs Reviewed   CBC WITH AUTO DIFFERENTIAL - Abnormal; Notable for the following components:       Result Value    WBC 12.0 (*)     RBC 3.64 (*)     Hemoglobin 10.1 (*)     Hematocrit 30.5 (*)     Neutrophils % 90 (*)     Lymphocytes % 8 (*)     Eosinophils % 0 (*)     Neutrophils Absolute 10.80 (*)     Lymphocytes Absolute 0.96 (*)

## 2024-01-09 NOTE — ED PROVIDER NOTES
[facility] EMERGENCY DEPARTMENT  Emergency Department Encounter  Mid Level Provider     Pt Name: Jose Maria Ge  MRN: [id number]  Birthdate 1955  Date of evaluation: 1/8/24  PCP:  No primary care provider on file.    CHIEF COMPLAINT       Chief Complaint   Patient presents with    Urinary Retention     Urinary retention.  900ml reported bladder volume.  Nurse unable to pass catheter.  Sent to ER for keys cath.        HISTORY OF PRESENT ILLNESS  (Location/Symptom, Timing/Onset,Context/Setting, Quality, Duration, Modifying Factors, Severity.)      Jose Maria Ge is a 68 y.o. male who presents with complaints of urinary retention that has been ongoing for the past 2 to 3 days.  He does stay at a nursing home and a bladder scan was done this afternoon which revealed greater than 900 mL in the bladder Keys catheter was attempted but unsuccessful the patient was sent here for further evaluation.  He reports he is having some abdominal discomfort and has had some intermittent fevers and chills.  He denies any nausea or vomiting.    He reports a history of urinary retention in the past several years ago secondary to an enlarged prostate however he reports he has not had issues since.    PAST MEDICAL /SURGICAL / SOCIAL / FAMILY HISTORY      has no past medical history on file.     has no past surgical history on file.    Social History     Socioeconomic History    Marital status: Single     Spouse name: Not on file    Number of children: Not on file    Years of education: Not on file    Highest education level: Not on file   Occupational History    Not on file   Tobacco Use    Smoking status: Not on file    Smokeless tobacco: Not on file   Substance and Sexual Activity    Alcohol use: Not on file    Drug use: Not on file    Sexual activity: Not on file   Other Topics Concern    Not on file   Social History Narrative    Not on file     Social Determinants of Health     Financial Resource Strain: Not on  dilatation. Workstation:[id number] Finalized by Erwin Hopkins MD on 12/26/2023 5:01 PM        XR CHEST PORTABLE   Final Result   Patchy opacities in the right lower lung, atelectasis versus pneumonia,   correlate clinically.             Labs:  Results for orders placed or performed during the hospital encounter of 01/08/24   CBC with Auto Differential   Result Value Ref Range    WBC 12.0 (H) 3.5 - 11.0 k/uL    RBC 3.64 (L) 4.5 - 5.9 m/uL    Hemoglobin 10.1 (L) 13.5 - 17.5 g/dL    Hematocrit 30.5 (L) 41 - 53 %    MCV 83.7 80 - 100 fL    MCH 27.8 26 - 34 pg    MCHC 33.2 31 - 37 g/dL    RDW 14.0 12.5 - 15.4 %    Platelets 369 140 - 450 k/uL    MPV 7.8 6.0 - 12.0 fL    Neutrophils % 90 (H) 36 - 66 %    Lymphocytes % 8 (L) 24 - 44 %    Monocytes % 1 1 - 7 %    Eosinophils % 0 (L) 1 - 4 %    Basophils % 1 0 - 2 %    Neutrophils Absolute 10.80 (H) 1.8 - 7.7 k/uL    Lymphocytes Absolute 0.96 (L) 1.0 - 4.8 k/uL    Monocytes Absolute 0.12 0.1 - 0.8 k/uL    Eosinophils Absolute 0.00 0.0 - 0.4 k/uL    Basophils Absolute 0.12 0.0 - 0.2 k/uL    Morphology Normal    BMP   Result Value Ref Range    Sodium 131 (L) 135 - 144 mmol/L    Potassium 5.6 (H) 3.7 - 5.3 mmol/L    Chloride 95 (L) 98 - 107 mmol/L    CO2 20 20 - 31 mmol/L    Anion Gap 16 9 - 17 mmol/L    Glucose 136 (H) 70 - 99 mg/dL    BUN 37 (H) 8 - 23 mg/dL    Creatinine 4.1 (H) 0.7 - 1.2 mg/dL    Est, Glom Filt Rate 15 (L) >60 mL/min/1.73m2    Calcium 9.3 8.6 - 10.4 mg/dL   Hepatic Function Panel   Result Value Ref Range    Albumin 3.5 3.5 - 5.2 g/dL    Alkaline Phosphatase 80 40 - 129 U/L    ALT 28 5 - 41 U/L    AST 23 <40 U/L    Total Bilirubin 0.6 0.3 - 1.2 mg/dL    Bilirubin, Direct 0.2 <0.3 mg/dL    Bilirubin, Indirect 0.4 0.0 - 1.0 mg/dL    Total Protein 9.0 (H) 6.4 - 8.3 g/dL    Albumin/Globulin Ratio 0.6 (L) 1.0 - 2.5   Magnesium   Result Value Ref Range    Magnesium 1.8 1.6 - 2.6 mg/dL   EKG 12 Lead   Result Value Ref Range    Ventricular Rate 136 BPM    Atrial Rate

## 2024-01-09 NOTE — ANESTHESIA PRE PROCEDURE
Department of Anesthesiology  Preprocedure Note       Name:  Jose Maria Ge   Age:  68 y.o.  :  1955                                          MRN:  [id number]         Date:  2024      Surgeon: Surgeon(s):  Aftab Leyva MD    Procedure:   HYBRID RM; THROMBECTOMY MECHANICAL PERCUTANEOUS FOR SADDLE PULMONARY EMBOLISM   Anesthesia type: General       Medications prior to admission:   Prior to Admission medications    Not on File       Current medications:    Current Facility-Administered Medications   Medication Dose Route Frequency Provider Last Rate Last Admin    heparin (porcine) injection 10,000 Units  10,000 Units IntraVENous PRN Caitlyn Garcia MD        heparin (porcine) injection 5,000 Units  5,000 Units IntraVENous PRN Caitlyn Garcia MD        heparin 25,000 units in dextrose 5% 250 mL (premix) infusion  5-30 Units/kg/hr IntraVENous Continuous Caitlyn Garcia MD 20.3 mL/hr at 24 0615 14 Units/kg/hr at 24 0615    sodium chloride flush 0.9 % injection 5-40 mL  5-40 mL IntraVENous 2 times per day Ulisses Kirkland,         sodium chloride flush 0.9 % injection 5-40 mL  5-40 mL IntraVENous PRN Ulisses Kirkland,         0.9 % sodium chloride infusion   IntraVENous PRN Ulisses Kirkland,         ondansetron (ZOFRAN-ODT) disintegrating tablet 4 mg  4 mg Oral Q8H PRN Ulisses Kirkland DO        Or    ondansetron (ZOFRAN) injection 4 mg  4 mg IntraVENous Q6H PRN Ulisses Kirkland, DO        polyethylene glycol (GLYCOLAX) packet 17 g  17 g Oral Daily PRN Ulisses Kirkland,         acetaminophen (TYLENOL) tablet 650 mg  650 mg Oral Q6H PRN Ulisses Kirkland DO        Or    acetaminophen (TYLENOL) suppository 650 mg  650 mg Rectal Q6H PRN Ulisses Kirkland,         0.9 % sodium chloride infusion   IntraVENous PRN Ale Beckham DO        iodixanol (VISIPAQUE) 320 MG/ML injection             lidocaine 1 % injection             pantoprazole (PROTONIX) 40 mg in sodium

## 2024-01-09 NOTE — ED NOTES
Patient presents to the ED by EMS as a transfer from [place]. Patient reports a history of keys catheter that was placed two weeks ago for enlarged prostate. Patient notes experiencing urethral pain the last couple of days which the catheter was removed, [place] tried re-inserting catheter and the patient was experiencing increased pain and bleeding. En route to ED, patient was experiencing SVT and was given 6 mg and 12 mg of Adenosine and converted back to sinus rhythm. Patient denies SOB, chest pain, abdominal pain or nausea.  Patient also finished calcium gluconate upon arrival. Patient connected to life pack.  EKG completed. IV access established. Call light within reach.

## 2024-01-09 NOTE — OP NOTE
Operative Note      Patient: Jose Maria Ge  YOB: 1955  MRN: [id number]    Date of Procedure: 1/9/2024    Pre-Op Diagnosis Codes:     * Acute saddle pulmonary embolism with acute cor pulmonale (HCC) [I26.02]    Post-Op Diagnosis: Same       Procedure:  1.  Right common femoral venous access under ultrasound guidance.  2.  Percutaneous mechanical thrombectomy of saddle pulmonary embolism and left pulmonary arterial embolism with extirpation of thrombus    Surgeon(s):  Aftab Leyva MD    Assistant:   Izabela James    Anesthesia: Monitor Anesthesia Care    Estimated Blood Loss (mL): 250 cc    Complications: None    Specimens:   None    Implants:  None      Drains:   Urinary Catheter 01/09/24 (Active)   $ Urethral catheter insertion $ Not inserted for procedure 01/09/24 0639   Site Assessment Bleeding 01/09/24 0639   Urine Color Bloody 01/09/24 0639   Urine Appearance Hazy 01/09/24 0639   Collection Container Standard 01/09/24 0639   Securement Method Leg strap 01/09/24 0639   Catheter Care  Perineal wipes 01/09/24 0639   Catheter Best Practices  Drainage tube clipped to bed 01/09/24 0639   Status Draining 01/09/24 0639       Findings: Large amount of clot was retrieved from the left pulmonary artery and the main pulmonary artery.  Patient did not lower their heart rate during the case.  Patient explained that they feel better in terms of shortness of breath at the conclusion of the case.        Detailed Description of Procedure:   Patient brought to the operating room and placed in the supine position with the arms tucked at the sides.  Patient was given very light sedation at which time the groins were prepped and draped in sterile fashion.  Timeout was held before lidocaine without epinephrine was infiltrated into the skin subcutaneous tissue overlying the right common femoral vein.  The right common femoral vein was then accessed with an 18-gauge needle through which modified Seldinger technique was

## 2024-01-10 ENCOUNTER — APPOINTMENT (OUTPATIENT)
Age: 69
DRG: 853 | End: 2024-01-10
Payer: COMMERCIAL

## 2024-01-10 PROBLEM — N17.9 AKI (ACUTE KIDNEY INJURY) (HCC): Status: ACTIVE | Noted: 2024-01-10

## 2024-01-10 PROBLEM — A41.59 SEPSIS DUE TO KLEBSIELLA (HCC): Status: ACTIVE | Noted: 2024-01-10

## 2024-01-10 LAB
ANION GAP SERPL CALCULATED.3IONS-SCNC: 10 MMOL/L (ref 9–17)
ANION GAP SERPL CALCULATED.3IONS-SCNC: 11 MMOL/L (ref 9–17)
ANION GAP SERPL CALCULATED.3IONS-SCNC: 11 MMOL/L (ref 9–17)
ANTI-XA UNFRAC HEPARIN: 0.48 IU/L
BASOPHILS # BLD: 0 K/UL (ref 0–0.2)
BASOPHILS NFR BLD: 0 % (ref 0–2)
BUN SERPL-MCNC: 43 MG/DL (ref 8–23)
BUN SERPL-MCNC: 44 MG/DL (ref 8–23)
BUN SERPL-MCNC: 44 MG/DL (ref 8–23)
CALCIUM SERPL-MCNC: 6.9 MG/DL (ref 8.6–10.4)
CALCIUM SERPL-MCNC: 7.4 MG/DL (ref 8.6–10.4)
CALCIUM SERPL-MCNC: 7.8 MG/DL (ref 8.6–10.4)
CHLORIDE SERPL-SCNC: 100 MMOL/L (ref 98–107)
CHLORIDE SERPL-SCNC: 100 MMOL/L (ref 98–107)
CHLORIDE SERPL-SCNC: 101 MMOL/L (ref 98–107)
CO2 SERPL-SCNC: 23 MMOL/L (ref 20–31)
CO2 SERPL-SCNC: 24 MMOL/L (ref 20–31)
CO2 SERPL-SCNC: 24 MMOL/L (ref 20–31)
CREAT SERPL-MCNC: 2.8 MG/DL (ref 0.7–1.2)
CREAT SERPL-MCNC: 3 MG/DL (ref 0.7–1.2)
CREAT SERPL-MCNC: 3.2 MG/DL (ref 0.7–1.2)
ECHO AO ROOT DIAM: 3 CM
ECHO AO ROOT INDEX: 1.16 CM/M2
ECHO AV AREA PEAK VELOCITY: 2.8 CM2
ECHO AV AREA VTI: 3.4 CM2
ECHO AV AREA/BSA PEAK VELOCITY: 1.1 CM2/M2
ECHO AV AREA/BSA VTI: 1.3 CM2/M2
ECHO AV MEAN GRADIENT: 4 MMHG
ECHO AV MEAN VELOCITY: 1 M/S
ECHO AV PEAK GRADIENT: 10 MMHG
ECHO AV PEAK VELOCITY: 1.6 M/S
ECHO AV VELOCITY RATIO: 0.75
ECHO AV VTI: 28.6 CM
ECHO BSA: 2.65 M2
ECHO BSA: 2.66 M2
ECHO EST RA PRESSURE: 8 MMHG
ECHO LA AREA 2C: 28.1 CM2
ECHO LA AREA 4C: 24.9 CM2
ECHO LA DIAMETER INDEX: 1.47 CM/M2
ECHO LA DIAMETER: 3.8 CM
ECHO LA MAJOR AXIS: 6.1 CM
ECHO LA MINOR AXIS: 7 CM
ECHO LA TO AORTIC ROOT RATIO: 1.27
ECHO LA VOL BP: 89 ML (ref 18–58)
ECHO LA VOL MOD A2C: 88 ML (ref 18–58)
ECHO LA VOL MOD A4C: 80 ML (ref 18–58)
ECHO LA VOL/BSA BIPLANE: 34 ML/M2 (ref 16–34)
ECHO LA VOLUME INDEX MOD A2C: 34 ML/M2 (ref 16–34)
ECHO LA VOLUME INDEX MOD A4C: 31 ML/M2 (ref 16–34)
ECHO LV E' LATERAL VELOCITY: 11 CM/S
ECHO LV E' SEPTAL VELOCITY: 8 CM/S
ECHO LV EDV A2C: 56 ML
ECHO LV EDV A4C: 101 ML
ECHO LV EDV INDEX A4C: 39 ML/M2
ECHO LV EDV NDEX A2C: 22 ML/M2
ECHO LV EJECTION FRACTION A2C: 58 %
ECHO LV EJECTION FRACTION A4C: 63 %
ECHO LV EJECTION FRACTION BIPLANE: 61 % (ref 55–100)
ECHO LV ESV A2C: 24 ML
ECHO LV ESV A4C: 38 ML
ECHO LV ESV INDEX A2C: 9 ML/M2
ECHO LV ESV INDEX A4C: 15 ML/M2
ECHO LV FRACTIONAL SHORTENING: 39 % (ref 28–44)
ECHO LV INTERNAL DIMENSION DIASTOLE INDEX: 1.78 CM/M2
ECHO LV INTERNAL DIMENSION DIASTOLIC: 4.6 CM (ref 4.2–5.9)
ECHO LV INTERNAL DIMENSION SYSTOLIC INDEX: 1.09 CM/M2
ECHO LV INTERNAL DIMENSION SYSTOLIC: 2.8 CM
ECHO LV IVSD: 1.5 CM (ref 0.6–1)
ECHO LV MASS 2D: 256.8 G (ref 88–224)
ECHO LV MASS INDEX 2D: 99.5 G/M2 (ref 49–115)
ECHO LV POSTERIOR WALL DIASTOLIC: 1.3 CM (ref 0.6–1)
ECHO LV RELATIVE WALL THICKNESS RATIO: 0.57
ECHO LVOT AREA: 3.8 CM2
ECHO LVOT AV VTI INDEX: 0.89
ECHO LVOT DIAM: 2.2 CM
ECHO LVOT MEAN GRADIENT: 3 MMHG
ECHO LVOT PEAK GRADIENT: 6 MMHG
ECHO LVOT PEAK VELOCITY: 1.2 M/S
ECHO LVOT STROKE VOLUME INDEX: 37.4 ML/M2
ECHO LVOT SV: 96.5 ML
ECHO LVOT VTI: 25.4 CM
ECHO MV A VELOCITY: 1.37 M/S
ECHO MV AREA VTI: 2.7 CM2
ECHO MV E DECELERATION TIME (DT): 163 MS
ECHO MV E VELOCITY: 1.06 M/S
ECHO MV E/A RATIO: 0.77
ECHO MV E/E' LATERAL: 9.64
ECHO MV E/E' RATIO (AVERAGED): 11.44
ECHO MV LVOT VTI INDEX: 1.43
ECHO MV MAX VELOCITY: 1.5 M/S
ECHO MV MEAN GRADIENT: 4 MMHG
ECHO MV MEAN VELOCITY: 0.9 M/S
ECHO MV PEAK GRADIENT: 9 MMHG
ECHO MV VTI: 36.2 CM
ECHO PV MAX VELOCITY: 0.9 M/S
ECHO PV PEAK GRADIENT: 3 MMHG
ECHO RIGHT VENTRICULAR SYSTOLIC PRESSURE (RVSP): 60 MMHG
ECHO RV BASAL DIMENSION: 3.9 CM
ECHO RV FREE WALL PEAK S': 10 CM/S
ECHO RV TAPSE: 2.7 CM (ref 1.7–?)
ECHO TV REGURGITANT MAX VELOCITY: 3.61 M/S
ECHO TV REGURGITANT PEAK GRADIENT: 52 MMHG
EOSINOPHIL # BLD: 0 K/UL (ref 0–0.4)
EOSINOPHILS RELATIVE PERCENT: 0 % (ref 1–4)
ERYTHROCYTE [DISTWIDTH] IN BLOOD BY AUTOMATED COUNT: 13.4 % (ref 11.8–14.4)
GFR SERPL CREATININE-BSD FRML MDRD: 20 ML/MIN/1.73M2
GFR SERPL CREATININE-BSD FRML MDRD: 22 ML/MIN/1.73M2
GFR SERPL CREATININE-BSD FRML MDRD: 24 ML/MIN/1.73M2
GLUCOSE SERPL-MCNC: 102 MG/DL (ref 70–99)
GLUCOSE SERPL-MCNC: 108 MG/DL (ref 70–99)
GLUCOSE SERPL-MCNC: 116 MG/DL (ref 70–99)
HCT VFR BLD AUTO: 20.9 % (ref 40.7–50.3)
HCT VFR BLD AUTO: 26.3 % (ref 40.7–50.3)
HCT VFR BLD AUTO: 27 % (ref 40.7–50.3)
HGB BLD-MCNC: 6.4 G/DL (ref 13–17)
HGB BLD-MCNC: 7.6 G/DL (ref 13–17)
HGB BLD-MCNC: 8.1 G/DL (ref 13–17)
IMM GRANULOCYTES # BLD AUTO: 0 K/UL (ref 0–0.3)
IMM GRANULOCYTES NFR BLD: 0 %
LYMPHOCYTES NFR BLD: 0.71 K/UL (ref 1–4.8)
LYMPHOCYTES RELATIVE PERCENT: 4 % (ref 24–44)
MCH RBC QN AUTO: 27 PG (ref 25.2–33.5)
MCHC RBC AUTO-ENTMCNC: 28.9 G/DL (ref 28.4–34.8)
MCV RBC AUTO: 93.3 FL (ref 82.6–102.9)
MONOCYTES NFR BLD: 0.89 K/UL (ref 0.1–0.8)
MONOCYTES NFR BLD: 5 % (ref 1–7)
MORPHOLOGY: NORMAL
MRSA, DNA, NASAL: NEGATIVE
NEUTROPHILS NFR BLD: 91 % (ref 36–66)
NEUTS SEG NFR BLD: 16.1 K/UL (ref 1.8–7.7)
NRBC BLD-RTO: 0 PER 100 WBC
PLATELET # BLD AUTO: 168 K/UL (ref 138–453)
PMV BLD AUTO: 10.3 FL (ref 8.1–13.5)
POTASSIUM SERPL-SCNC: 4.5 MMOL/L (ref 3.7–5.3)
POTASSIUM SERPL-SCNC: 5 MMOL/L (ref 3.7–5.3)
POTASSIUM SERPL-SCNC: 5 MMOL/L (ref 3.7–5.3)
RBC # BLD AUTO: 2.82 M/UL (ref 4.21–5.77)
SODIUM SERPL-SCNC: 133 MMOL/L (ref 135–144)
SODIUM SERPL-SCNC: 135 MMOL/L (ref 135–144)
SODIUM SERPL-SCNC: 136 MMOL/L (ref 135–144)
SPECIMEN DESCRIPTION: NORMAL
TROPONIN I SERPL HS-MCNC: 69 NG/L (ref 0–22)
WBC OTHER # BLD: 17.7 K/UL (ref 3.5–11.3)

## 2024-01-10 PROCEDURE — 6360000002 HC RX W HCPCS

## 2024-01-10 PROCEDURE — 6360000002 HC RX W HCPCS: Performed by: INTERNAL MEDICINE

## 2024-01-10 PROCEDURE — 2000000000 HC ICU R&B

## 2024-01-10 PROCEDURE — 6370000000 HC RX 637 (ALT 250 FOR IP): Performed by: STUDENT IN AN ORGANIZED HEALTH CARE EDUCATION/TRAINING PROGRAM

## 2024-01-10 PROCEDURE — 99291 CRITICAL CARE FIRST HOUR: CPT | Performed by: INTERNAL MEDICINE

## 2024-01-10 PROCEDURE — 6370000000 HC RX 637 (ALT 250 FOR IP)

## 2024-01-10 PROCEDURE — 36415 COLL VENOUS BLD VENIPUNCTURE: CPT

## 2024-01-10 PROCEDURE — 2580000003 HC RX 258: Performed by: INTERNAL MEDICINE

## 2024-01-10 PROCEDURE — 2700000000 HC OXYGEN THERAPY PER DAY

## 2024-01-10 PROCEDURE — 99232 SBSQ HOSP IP/OBS MODERATE 35: CPT | Performed by: INTERNAL MEDICINE

## 2024-01-10 PROCEDURE — 2580000003 HC RX 258: Performed by: PHYSICIAN ASSISTANT

## 2024-01-10 PROCEDURE — 93306 TTE W/DOPPLER COMPLETE: CPT

## 2024-01-10 PROCEDURE — A4216 STERILE WATER/SALINE, 10 ML: HCPCS | Performed by: STUDENT IN AN ORGANIZED HEALTH CARE EDUCATION/TRAINING PROGRAM

## 2024-01-10 PROCEDURE — 85520 HEPARIN ASSAY: CPT

## 2024-01-10 PROCEDURE — 85025 COMPLETE CBC W/AUTO DIFF WBC: CPT

## 2024-01-10 PROCEDURE — 99233 SBSQ HOSP IP/OBS HIGH 50: CPT | Performed by: INTERNAL MEDICINE

## 2024-01-10 PROCEDURE — 2060000000 HC ICU INTERMEDIATE R&B

## 2024-01-10 PROCEDURE — 80048 BASIC METABOLIC PNL TOTAL CA: CPT

## 2024-01-10 PROCEDURE — C9113 INJ PANTOPRAZOLE SODIUM, VIA: HCPCS | Performed by: STUDENT IN AN ORGANIZED HEALTH CARE EDUCATION/TRAINING PROGRAM

## 2024-01-10 PROCEDURE — 2500000003 HC RX 250 WO HCPCS: Performed by: INTERNAL MEDICINE

## 2024-01-10 PROCEDURE — 2580000003 HC RX 258: Performed by: ANESTHESIOLOGY

## 2024-01-10 PROCEDURE — 6360000002 HC RX W HCPCS: Performed by: STUDENT IN AN ORGANIZED HEALTH CARE EDUCATION/TRAINING PROGRAM

## 2024-01-10 PROCEDURE — 2580000003 HC RX 258

## 2024-01-10 PROCEDURE — 93306 TTE W/DOPPLER COMPLETE: CPT | Performed by: INTERNAL MEDICINE

## 2024-01-10 PROCEDURE — 84484 ASSAY OF TROPONIN QUANT: CPT

## 2024-01-10 PROCEDURE — 85018 HEMOGLOBIN: CPT

## 2024-01-10 PROCEDURE — 94761 N-INVAS EAR/PLS OXIMETRY MLT: CPT

## 2024-01-10 PROCEDURE — 85014 HEMATOCRIT: CPT

## 2024-01-10 PROCEDURE — 2580000003 HC RX 258: Performed by: STUDENT IN AN ORGANIZED HEALTH CARE EDUCATION/TRAINING PROGRAM

## 2024-01-10 RX ORDER — 0.9 % SODIUM CHLORIDE 0.9 %
500 INTRAVENOUS SOLUTION INTRAVENOUS ONCE
Status: COMPLETED | OUTPATIENT
Start: 2024-01-10 | End: 2024-01-10

## 2024-01-10 RX ORDER — SODIUM CHLORIDE 9 MG/ML
INJECTION, SOLUTION INTRAVENOUS CONTINUOUS
Status: DISCONTINUED | OUTPATIENT
Start: 2024-01-10 | End: 2024-01-12

## 2024-01-10 RX ORDER — MIDODRINE HYDROCHLORIDE 5 MG/1
5 TABLET ORAL 3 TIMES DAILY
Status: DISCONTINUED | OUTPATIENT
Start: 2024-01-11 | End: 2024-01-12

## 2024-01-10 RX ORDER — MIDODRINE HYDROCHLORIDE 5 MG/1
5 TABLET ORAL 3 TIMES DAILY
Status: DISCONTINUED | OUTPATIENT
Start: 2024-01-11 | End: 2024-01-10

## 2024-01-10 RX ORDER — OXYBUTYNIN CHLORIDE 5 MG/1
5 TABLET, EXTENDED RELEASE ORAL NIGHTLY PRN
Status: DISCONTINUED | OUTPATIENT
Start: 2024-01-10 | End: 2024-01-16 | Stop reason: HOSPADM

## 2024-01-10 RX ORDER — MIDODRINE HYDROCHLORIDE 5 MG/1
5 TABLET ORAL 3 TIMES DAILY PRN
Status: DISCONTINUED | OUTPATIENT
Start: 2024-01-10 | End: 2024-01-10

## 2024-01-10 RX ORDER — SODIUM CHLORIDE 9 MG/ML
INJECTION, SOLUTION INTRAVENOUS PRN
Status: DISCONTINUED | OUTPATIENT
Start: 2024-01-10 | End: 2024-01-16 | Stop reason: HOSPADM

## 2024-01-10 RX ADMIN — SODIUM CHLORIDE, PRESERVATIVE FREE 40 MG: 5 INJECTION INTRAVENOUS at 09:17

## 2024-01-10 RX ADMIN — HEPARIN SODIUM 14 UNITS/KG/HR: 10000 INJECTION, SOLUTION INTRAVENOUS at 02:09

## 2024-01-10 RX ADMIN — MEROPENEM 1000 MG: 1 INJECTION, POWDER, FOR SOLUTION INTRAVENOUS at 03:27

## 2024-01-10 RX ADMIN — SODIUM CHLORIDE 500 ML: 9 INJECTION, SOLUTION INTRAVENOUS at 18:10

## 2024-01-10 RX ADMIN — MEROPENEM 1000 MG: 1 INJECTION, POWDER, FOR SOLUTION INTRAVENOUS at 13:58

## 2024-01-10 RX ADMIN — SODIUM CHLORIDE 3000 ML: 900 SOLUTION EXTRACORPOREAL at 19:39

## 2024-01-10 RX ADMIN — SODIUM CHLORIDE: 9 INJECTION, SOLUTION INTRAVENOUS at 11:26

## 2024-01-10 RX ADMIN — SODIUM CHLORIDE 3000 ML: 900 SOLUTION EXTRACORPOREAL at 04:00

## 2024-01-10 RX ADMIN — SODIUM CHLORIDE 3000 ML: 900 SOLUTION EXTRACORPOREAL at 01:13

## 2024-01-10 RX ADMIN — SODIUM CHLORIDE 3000 ML: 900 SOLUTION EXTRACORPOREAL at 03:00

## 2024-01-10 RX ADMIN — SODIUM CHLORIDE 3000 ML: 900 SOLUTION EXTRACORPOREAL at 02:06

## 2024-01-10 RX ADMIN — ACETAMINOPHEN 650 MG: 325 TABLET ORAL at 03:32

## 2024-01-10 RX ADMIN — SODIUM BICARBONATE: 84 INJECTION, SOLUTION INTRAVENOUS at 04:56

## 2024-01-10 RX ADMIN — SODIUM CHLORIDE, PRESERVATIVE FREE 10 ML: 5 INJECTION INTRAVENOUS at 19:40

## 2024-01-10 RX ADMIN — ACETAMINOPHEN 650 MG: 325 TABLET ORAL at 09:17

## 2024-01-10 RX ADMIN — MIDODRINE HYDROCHLORIDE 5 MG: 5 TABLET ORAL at 22:13

## 2024-01-10 RX ADMIN — SODIUM CHLORIDE, PRESERVATIVE FREE 10 ML: 5 INJECTION INTRAVENOUS at 09:19

## 2024-01-10 RX ADMIN — SODIUM CHLORIDE: 9 INJECTION, SOLUTION INTRAVENOUS at 20:34

## 2024-01-10 RX ADMIN — SODIUM CHLORIDE 3000 ML: 900 SOLUTION EXTRACORPOREAL at 04:53

## 2024-01-10 RX ADMIN — SODIUM CHLORIDE 3000 ML: 900 SOLUTION EXTRACORPOREAL at 22:40

## 2024-01-10 RX ADMIN — SODIUM CHLORIDE, PRESERVATIVE FREE 10 ML: 5 INJECTION INTRAVENOUS at 19:41

## 2024-01-10 RX ADMIN — ACETAMINOPHEN 650 MG: 325 TABLET ORAL at 15:34

## 2024-01-10 RX ADMIN — Medication 2000 MG: at 17:27

## 2024-01-10 RX ADMIN — HEPARIN SODIUM 14 UNITS/KG/HR: 10000 INJECTION, SOLUTION INTRAVENOUS at 13:55

## 2024-01-10 RX ADMIN — SODIUM CHLORIDE 3000 ML: 900 SOLUTION EXTRACORPOREAL at 00:10

## 2024-01-10 ASSESSMENT — PAIN SCALES - GENERAL
PAINLEVEL_OUTOF10: 5
PAINLEVEL_OUTOF10: 5

## 2024-01-10 NOTE — CARE COORDINATION
Transitional planning. Spoke to pt and pt's brother, pt will need to go to SNF when dc'd. Came from [facility], reviewing SNF list for other choices- referral faxed to [facility]    [facility], PT/OT ordered. Reviewing SNF list  Spoke to Barbra with [facility], pt came from facility, can return, will need pre-cert- referral faxed.

## 2024-01-11 ENCOUNTER — APPOINTMENT (OUTPATIENT)
Age: 69
DRG: 853 | End: 2024-01-11
Payer: COMMERCIAL

## 2024-01-11 PROBLEM — R50.9 PERSISTENT FEVER: Status: ACTIVE | Noted: 2024-01-11

## 2024-01-11 LAB
ANION GAP SERPL CALCULATED.3IONS-SCNC: 11 MMOL/L (ref 9–17)
ANTI-XA UNFRAC HEPARIN: 0.54 IU/L
BASOPHILS # BLD: 0 K/UL (ref 0–0.2)
BASOPHILS NFR BLD: 0 % (ref 0–2)
BUN SERPL-MCNC: 36 MG/DL (ref 8–23)
CALCIUM SERPL-MCNC: 6.6 MG/DL (ref 8.6–10.4)
CHLORIDE SERPL-SCNC: 103 MMOL/L (ref 98–107)
CO2 SERPL-SCNC: 22 MMOL/L (ref 20–31)
CREAT SERPL-MCNC: 1.9 MG/DL (ref 0.7–1.2)
CRP SERPL HS-MCNC: 200.8 MG/L (ref 0–5)
EOSINOPHIL # BLD: 0.21 K/UL (ref 0–0.44)
EOSINOPHILS RELATIVE PERCENT: 2 % (ref 1–4)
ERYTHROCYTE [DISTWIDTH] IN BLOOD BY AUTOMATED COUNT: 13.7 % (ref 11.8–14.4)
GFR SERPL CREATININE-BSD FRML MDRD: 37 ML/MIN/1.73M2
GLUCOSE BLD-MCNC: 86 MG/DL (ref 75–110)
GLUCOSE SERPL-MCNC: 94 MG/DL (ref 70–99)
HCT VFR BLD AUTO: 20.9 % (ref 40.7–50.3)
HCT VFR BLD AUTO: 23.6 % (ref 40.7–50.3)
HCT VFR BLD AUTO: 24 % (ref 40.7–50.3)
HCT VFR BLD AUTO: 24.5 % (ref 40.7–50.3)
HGB BLD-MCNC: 6.7 G/DL (ref 13–17)
HGB BLD-MCNC: 7.3 G/DL (ref 13–17)
HGB BLD-MCNC: 7.4 G/DL (ref 13–17)
HGB BLD-MCNC: 7.6 G/DL (ref 13–17)
IMM GRANULOCYTES # BLD AUTO: 0.11 K/UL (ref 0–0.3)
IMM GRANULOCYTES NFR BLD: 1 %
LYMPHOCYTES NFR BLD: 0.63 K/UL (ref 1.1–3.7)
LYMPHOCYTES RELATIVE PERCENT: 6 % (ref 24–43)
MCH RBC QN AUTO: 27.3 PG (ref 25.2–33.5)
MCHC RBC AUTO-ENTMCNC: 30.9 G/DL (ref 28.4–34.8)
MCV RBC AUTO: 88.4 FL (ref 82.6–102.9)
MICROORGANISM SPEC CULT: ABNORMAL
MONOCYTES NFR BLD: 0.63 K/UL (ref 0.1–1.2)
MONOCYTES NFR BLD: 6 % (ref 3–12)
MORPHOLOGY: NORMAL
NEUTROPHILS NFR BLD: 85 % (ref 36–65)
NEUTS SEG NFR BLD: 8.92 K/UL (ref 1.5–8.1)
NRBC BLD-RTO: 0 PER 100 WBC
PLATELET # BLD AUTO: 183 K/UL (ref 138–453)
PMV BLD AUTO: 10.9 FL (ref 8.1–13.5)
POTASSIUM SERPL-SCNC: 4.3 MMOL/L (ref 3.7–5.3)
RBC # BLD AUTO: 2.67 M/UL (ref 4.21–5.77)
SERVICE CMNT-IMP: ABNORMAL
SERVICE CMNT-IMP: ABNORMAL
SODIUM SERPL-SCNC: 136 MMOL/L (ref 135–144)
SPECIMEN DESCRIPTION: ABNORMAL
WBC OTHER # BLD: 10.5 K/UL (ref 3.5–11.3)

## 2024-01-11 PROCEDURE — 6360000002 HC RX W HCPCS: Performed by: STUDENT IN AN ORGANIZED HEALTH CARE EDUCATION/TRAINING PROGRAM

## 2024-01-11 PROCEDURE — 2580000003 HC RX 258: Performed by: ANESTHESIOLOGY

## 2024-01-11 PROCEDURE — 6370000000 HC RX 637 (ALT 250 FOR IP)

## 2024-01-11 PROCEDURE — 2580000003 HC RX 258

## 2024-01-11 PROCEDURE — 51700 IRRIGATION OF BLADDER: CPT

## 2024-01-11 PROCEDURE — 80048 BASIC METABOLIC PNL TOTAL CA: CPT

## 2024-01-11 PROCEDURE — 99291 CRITICAL CARE FIRST HOUR: CPT | Performed by: INTERNAL MEDICINE

## 2024-01-11 PROCEDURE — P9016 RBC LEUKOCYTES REDUCED: HCPCS

## 2024-01-11 PROCEDURE — 2580000003 HC RX 258: Performed by: STUDENT IN AN ORGANIZED HEALTH CARE EDUCATION/TRAINING PROGRAM

## 2024-01-11 PROCEDURE — 6360000002 HC RX W HCPCS: Performed by: INTERNAL MEDICINE

## 2024-01-11 PROCEDURE — 85025 COMPLETE CBC W/AUTO DIFF WBC: CPT

## 2024-01-11 PROCEDURE — 6370000000 HC RX 637 (ALT 250 FOR IP): Performed by: STUDENT IN AN ORGANIZED HEALTH CARE EDUCATION/TRAINING PROGRAM

## 2024-01-11 PROCEDURE — 36430 TRANSFUSION BLD/BLD COMPNT: CPT

## 2024-01-11 PROCEDURE — 82947 ASSAY GLUCOSE BLOOD QUANT: CPT

## 2024-01-11 PROCEDURE — 30233N1 TRANSFUSION OF NONAUTOLOGOUS RED BLOOD CELLS INTO PERIPHERAL VEIN, PERCUTANEOUS APPROACH: ICD-10-PCS | Performed by: STUDENT IN AN ORGANIZED HEALTH CARE EDUCATION/TRAINING PROGRAM

## 2024-01-11 PROCEDURE — A4216 STERILE WATER/SALINE, 10 ML: HCPCS | Performed by: STUDENT IN AN ORGANIZED HEALTH CARE EDUCATION/TRAINING PROGRAM

## 2024-01-11 PROCEDURE — 2580000003 HC RX 258: Performed by: PHYSICIAN ASSISTANT

## 2024-01-11 PROCEDURE — 85520 HEPARIN ASSAY: CPT

## 2024-01-11 PROCEDURE — 2000000000 HC ICU R&B

## 2024-01-11 PROCEDURE — 2580000003 HC RX 258: Performed by: INTERNAL MEDICINE

## 2024-01-11 PROCEDURE — 99232 SBSQ HOSP IP/OBS MODERATE 35: CPT | Performed by: INTERNAL MEDICINE

## 2024-01-11 PROCEDURE — 36415 COLL VENOUS BLD VENIPUNCTURE: CPT

## 2024-01-11 PROCEDURE — 85018 HEMOGLOBIN: CPT

## 2024-01-11 PROCEDURE — 86140 C-REACTIVE PROTEIN: CPT

## 2024-01-11 PROCEDURE — 99233 SBSQ HOSP IP/OBS HIGH 50: CPT | Performed by: INTERNAL MEDICINE

## 2024-01-11 PROCEDURE — 87040 BLOOD CULTURE FOR BACTERIA: CPT

## 2024-01-11 PROCEDURE — 74176 CT ABD & PELVIS W/O CONTRAST: CPT

## 2024-01-11 PROCEDURE — 85014 HEMATOCRIT: CPT

## 2024-01-11 PROCEDURE — C9113 INJ PANTOPRAZOLE SODIUM, VIA: HCPCS | Performed by: STUDENT IN AN ORGANIZED HEALTH CARE EDUCATION/TRAINING PROGRAM

## 2024-01-11 RX ORDER — CALCIUM GLUCONATE 94 MG/ML
2000 INJECTION, SOLUTION INTRAVENOUS ONCE
Status: DISCONTINUED | OUTPATIENT
Start: 2024-01-11 | End: 2024-01-11 | Stop reason: SDUPTHER

## 2024-01-11 RX ORDER — SODIUM CHLORIDE 9 MG/ML
INJECTION, SOLUTION INTRAVENOUS PRN
Status: DISCONTINUED | OUTPATIENT
Start: 2024-01-11 | End: 2024-01-16 | Stop reason: HOSPADM

## 2024-01-11 RX ORDER — CALCIUM GLUCONATE 20 MG/ML
2000 INJECTION, SOLUTION INTRAVENOUS ONCE
Status: COMPLETED | OUTPATIENT
Start: 2024-01-11 | End: 2024-01-11

## 2024-01-11 RX ORDER — LEVOFLOXACIN 5 MG/ML
500 INJECTION, SOLUTION INTRAVENOUS EVERY 24 HOURS
Status: DISCONTINUED | OUTPATIENT
Start: 2024-01-11 | End: 2024-01-15

## 2024-01-11 RX ADMIN — MIDODRINE HYDROCHLORIDE 5 MG: 5 TABLET ORAL at 05:47

## 2024-01-11 RX ADMIN — SODIUM CHLORIDE 3000 ML: 900 SOLUTION EXTRACORPOREAL at 14:21

## 2024-01-11 RX ADMIN — SODIUM CHLORIDE 3000 ML: 900 SOLUTION EXTRACORPOREAL at 03:00

## 2024-01-11 RX ADMIN — SODIUM CHLORIDE 3000 ML: 900 SOLUTION EXTRACORPOREAL at 19:23

## 2024-01-11 RX ADMIN — SODIUM CHLORIDE 3000 ML: 900 SOLUTION EXTRACORPOREAL at 22:56

## 2024-01-11 RX ADMIN — SODIUM CHLORIDE 3000 ML: 900 SOLUTION EXTRACORPOREAL at 19:47

## 2024-01-11 RX ADMIN — Medication 2000 MG: at 16:15

## 2024-01-11 RX ADMIN — SODIUM CHLORIDE: 9 INJECTION, SOLUTION INTRAVENOUS at 05:50

## 2024-01-11 RX ADMIN — ACETAMINOPHEN 650 MG: 325 TABLET ORAL at 03:19

## 2024-01-11 RX ADMIN — SODIUM CHLORIDE 3000 ML: 900 SOLUTION EXTRACORPOREAL at 08:18

## 2024-01-11 RX ADMIN — SODIUM CHLORIDE 3000 ML: 900 SOLUTION EXTRACORPOREAL at 12:48

## 2024-01-11 RX ADMIN — HEPARIN SODIUM 14 UNITS/KG/HR: 10000 INJECTION, SOLUTION INTRAVENOUS at 01:30

## 2024-01-11 RX ADMIN — SODIUM CHLORIDE 3000 ML: 900 SOLUTION EXTRACORPOREAL at 17:00

## 2024-01-11 RX ADMIN — LEVOFLOXACIN 500 MG: 5 INJECTION, SOLUTION INTRAVENOUS at 16:03

## 2024-01-11 RX ADMIN — SODIUM CHLORIDE, PRESERVATIVE FREE 40 MG: 5 INJECTION INTRAVENOUS at 08:09

## 2024-01-11 RX ADMIN — HEPARIN SODIUM 14 UNITS/KG/HR: 10000 INJECTION, SOLUTION INTRAVENOUS at 14:19

## 2024-01-11 RX ADMIN — SODIUM CHLORIDE 3000 ML: 900 SOLUTION EXTRACORPOREAL at 11:17

## 2024-01-11 RX ADMIN — ACETAMINOPHEN 650 MG: 325 TABLET ORAL at 16:29

## 2024-01-11 RX ADMIN — SODIUM CHLORIDE 3000 ML: 900 SOLUTION EXTRACORPOREAL at 03:13

## 2024-01-11 RX ADMIN — SODIUM CHLORIDE 3000 ML: 900 SOLUTION EXTRACORPOREAL at 09:10

## 2024-01-11 RX ADMIN — CALCIUM GLUCONATE 2000 MG: 20 INJECTION, SOLUTION INTRAVENOUS at 10:49

## 2024-01-11 RX ADMIN — SODIUM CHLORIDE 3000 ML: 900 SOLUTION EXTRACORPOREAL at 15:30

## 2024-01-11 RX ADMIN — SODIUM CHLORIDE: 9 INJECTION, SOLUTION INTRAVENOUS at 16:13

## 2024-01-11 RX ADMIN — SODIUM CHLORIDE: 9 INJECTION, SOLUTION INTRAVENOUS at 10:47

## 2024-01-11 RX ADMIN — SODIUM CHLORIDE, PRESERVATIVE FREE 10 ML: 5 INJECTION INTRAVENOUS at 08:10

## 2024-01-11 RX ADMIN — SODIUM CHLORIDE 3000 ML: 900 SOLUTION EXTRACORPOREAL at 16:24

## 2024-01-11 ASSESSMENT — PAIN DESCRIPTION - ORIENTATION: ORIENTATION: RIGHT

## 2024-01-11 ASSESSMENT — PAIN - FUNCTIONAL ASSESSMENT: PAIN_FUNCTIONAL_ASSESSMENT: ACTIVITIES ARE NOT PREVENTED

## 2024-01-11 ASSESSMENT — PAIN SCALES - GENERAL
PAINLEVEL_OUTOF10: 3
PAINLEVEL_OUTOF10: 0

## 2024-01-11 ASSESSMENT — PAIN DESCRIPTION - LOCATION: LOCATION: LEG

## 2024-01-11 ASSESSMENT — PAIN DESCRIPTION - DESCRIPTORS: DESCRIPTORS: BURNING

## 2024-01-12 LAB
ABO/RH: NORMAL
ANION GAP SERPL CALCULATED.3IONS-SCNC: 9 MMOL/L (ref 9–17)
ANTI-XA UNFRAC HEPARIN: 0.53 IU/L
ANTIBODY SCREEN: NEGATIVE
ARM BAND NUMBER: NORMAL
BASOPHILS # BLD: 0.06 K/UL (ref 0–0.2)
BASOPHILS NFR BLD: 1 % (ref 0–2)
BLOOD BANK BLOOD PRODUCT EXPIRATION DATE: NORMAL
BLOOD BANK BLOOD PRODUCT EXPIRATION DATE: NORMAL
BLOOD BANK DISPENSE STATUS: NORMAL
BLOOD BANK DISPENSE STATUS: NORMAL
BLOOD BANK ISBT PRODUCT BLOOD TYPE: 5100
BLOOD BANK ISBT PRODUCT BLOOD TYPE: 5100
BLOOD BANK PRODUCT CODE: NORMAL
BLOOD BANK PRODUCT CODE: NORMAL
BLOOD BANK SAMPLE EXPIRATION: NORMAL
BLOOD BANK UNIT TYPE AND RH: NORMAL
BLOOD BANK UNIT TYPE AND RH: NORMAL
BPU ID: NORMAL
BPU ID: NORMAL
BUN SERPL-MCNC: 25 MG/DL (ref 8–23)
CALCIUM SERPL-MCNC: 7 MG/DL (ref 8.6–10.4)
CHLORIDE SERPL-SCNC: 103 MMOL/L (ref 98–107)
CO2 SERPL-SCNC: 21 MMOL/L (ref 20–31)
COMPONENT: NORMAL
COMPONENT: NORMAL
CREAT SERPL-MCNC: 1.4 MG/DL (ref 0.7–1.2)
CROSSMATCH RESULT: NORMAL
CROSSMATCH RESULT: NORMAL
CRP SERPL HS-MCNC: 127 MG/L (ref 0–5)
EOSINOPHIL # BLD: 0.13 K/UL (ref 0–0.44)
EOSINOPHILS RELATIVE PERCENT: 2 % (ref 1–4)
ERYTHROCYTE [DISTWIDTH] IN BLOOD BY AUTOMATED COUNT: 14.1 % (ref 11.8–14.4)
GFR SERPL CREATININE-BSD FRML MDRD: 54 ML/MIN/1.73M2
GLUCOSE SERPL-MCNC: 134 MG/DL (ref 70–99)
HCT VFR BLD AUTO: 23.9 % (ref 40.7–50.3)
HCT VFR BLD AUTO: 24 % (ref 40.7–50.3)
HCT VFR BLD AUTO: 25.5 % (ref 40.7–50.3)
HCT VFR BLD AUTO: 25.8 % (ref 40.7–50.3)
HGB BLD-MCNC: 7.5 G/DL (ref 13–17)
HGB BLD-MCNC: 7.6 G/DL (ref 13–17)
HGB BLD-MCNC: 7.7 G/DL (ref 13–17)
HGB BLD-MCNC: 7.8 G/DL (ref 13–17)
IMM GRANULOCYTES # BLD AUTO: 0.09 K/UL (ref 0–0.3)
IMM GRANULOCYTES NFR BLD: 1 %
LYMPHOCYTES NFR BLD: 0.8 K/UL (ref 1.1–3.7)
LYMPHOCYTES RELATIVE PERCENT: 10 % (ref 24–43)
MCH RBC QN AUTO: 27.5 PG (ref 25.2–33.5)
MCHC RBC AUTO-ENTMCNC: 31.3 G/DL (ref 28.4–34.8)
MCV RBC AUTO: 87.9 FL (ref 82.6–102.9)
MONOCYTES NFR BLD: 0.63 K/UL (ref 0.1–1.2)
MONOCYTES NFR BLD: 8 % (ref 3–12)
NEUTROPHILS NFR BLD: 78 % (ref 36–65)
NEUTS SEG NFR BLD: 6.08 K/UL (ref 1.5–8.1)
NRBC BLD-RTO: 0 PER 100 WBC
PLATELET # BLD AUTO: 200 K/UL (ref 138–453)
PMV BLD AUTO: 10.4 FL (ref 8.1–13.5)
POTASSIUM SERPL-SCNC: 4.2 MMOL/L (ref 3.7–5.3)
RBC # BLD AUTO: 2.73 M/UL (ref 4.21–5.77)
SODIUM SERPL-SCNC: 133 MMOL/L (ref 135–144)
TRANSFUSION STATUS: NORMAL
TRANSFUSION STATUS: NORMAL
UNIT DIVISION: 0
UNIT DIVISION: 0
UNIT ISSUE DATE/TIME: NORMAL
UNIT ISSUE DATE/TIME: NORMAL
WBC OTHER # BLD: 7.8 K/UL (ref 3.5–11.3)

## 2024-01-12 PROCEDURE — 6370000000 HC RX 637 (ALT 250 FOR IP): Performed by: NURSE PRACTITIONER

## 2024-01-12 PROCEDURE — 2700000000 HC OXYGEN THERAPY PER DAY

## 2024-01-12 PROCEDURE — 6370000000 HC RX 637 (ALT 250 FOR IP): Performed by: STUDENT IN AN ORGANIZED HEALTH CARE EDUCATION/TRAINING PROGRAM

## 2024-01-12 PROCEDURE — 2580000003 HC RX 258: Performed by: ANESTHESIOLOGY

## 2024-01-12 PROCEDURE — 2060000000 HC ICU INTERMEDIATE R&B

## 2024-01-12 PROCEDURE — 99232 SBSQ HOSP IP/OBS MODERATE 35: CPT | Performed by: INTERNAL MEDICINE

## 2024-01-12 PROCEDURE — 6360000002 HC RX W HCPCS: Performed by: STUDENT IN AN ORGANIZED HEALTH CARE EDUCATION/TRAINING PROGRAM

## 2024-01-12 PROCEDURE — 2580000003 HC RX 258

## 2024-01-12 PROCEDURE — A4216 STERILE WATER/SALINE, 10 ML: HCPCS | Performed by: STUDENT IN AN ORGANIZED HEALTH CARE EDUCATION/TRAINING PROGRAM

## 2024-01-12 PROCEDURE — 2580000003 HC RX 258: Performed by: PHYSICIAN ASSISTANT

## 2024-01-12 PROCEDURE — 2580000003 HC RX 258: Performed by: STUDENT IN AN ORGANIZED HEALTH CARE EDUCATION/TRAINING PROGRAM

## 2024-01-12 PROCEDURE — 85025 COMPLETE CBC W/AUTO DIFF WBC: CPT

## 2024-01-12 PROCEDURE — 6360000002 HC RX W HCPCS: Performed by: INTERNAL MEDICINE

## 2024-01-12 PROCEDURE — 36415 COLL VENOUS BLD VENIPUNCTURE: CPT

## 2024-01-12 PROCEDURE — 51700 IRRIGATION OF BLADDER: CPT

## 2024-01-12 PROCEDURE — 99291 CRITICAL CARE FIRST HOUR: CPT | Performed by: INTERNAL MEDICINE

## 2024-01-12 PROCEDURE — 86140 C-REACTIVE PROTEIN: CPT

## 2024-01-12 PROCEDURE — 94761 N-INVAS EAR/PLS OXIMETRY MLT: CPT

## 2024-01-12 PROCEDURE — 80048 BASIC METABOLIC PNL TOTAL CA: CPT

## 2024-01-12 PROCEDURE — 99223 1ST HOSP IP/OBS HIGH 75: CPT | Performed by: STUDENT IN AN ORGANIZED HEALTH CARE EDUCATION/TRAINING PROGRAM

## 2024-01-12 PROCEDURE — C9113 INJ PANTOPRAZOLE SODIUM, VIA: HCPCS | Performed by: STUDENT IN AN ORGANIZED HEALTH CARE EDUCATION/TRAINING PROGRAM

## 2024-01-12 PROCEDURE — 85520 HEPARIN ASSAY: CPT

## 2024-01-12 PROCEDURE — 85018 HEMOGLOBIN: CPT

## 2024-01-12 PROCEDURE — 85014 HEMATOCRIT: CPT

## 2024-01-12 RX ORDER — SODIUM CHLORIDE 9 MG/ML
INJECTION, SOLUTION INTRAVENOUS CONTINUOUS
Status: DISCONTINUED | OUTPATIENT
Start: 2024-01-12 | End: 2024-01-12

## 2024-01-12 RX ORDER — ATORVASTATIN CALCIUM 40 MG/1
40 TABLET, FILM COATED ORAL NIGHTLY
Status: DISCONTINUED | OUTPATIENT
Start: 2024-01-12 | End: 2024-01-16 | Stop reason: HOSPADM

## 2024-01-12 RX ORDER — BENZONATATE 100 MG/1
200 CAPSULE ORAL 3 TIMES DAILY PRN
Status: DISCONTINUED | OUTPATIENT
Start: 2024-01-12 | End: 2024-01-16 | Stop reason: HOSPADM

## 2024-01-12 RX ORDER — CARVEDILOL 6.25 MG/1
6.25 TABLET ORAL 2 TIMES DAILY WITH MEALS
Status: DISCONTINUED | OUTPATIENT
Start: 2024-01-12 | End: 2024-01-16 | Stop reason: HOSPADM

## 2024-01-12 RX ORDER — PANTOPRAZOLE SODIUM 40 MG/1
40 TABLET, DELAYED RELEASE ORAL
Status: DISCONTINUED | OUTPATIENT
Start: 2024-01-13 | End: 2024-01-16 | Stop reason: HOSPADM

## 2024-01-12 RX ORDER — MIDODRINE HYDROCHLORIDE 5 MG/1
5 TABLET ORAL 3 TIMES DAILY PRN
Status: DISCONTINUED | OUTPATIENT
Start: 2024-01-12 | End: 2024-01-16 | Stop reason: HOSPADM

## 2024-01-12 RX ADMIN — SODIUM CHLORIDE 3000 ML: 900 SOLUTION EXTRACORPOREAL at 04:09

## 2024-01-12 RX ADMIN — SODIUM CHLORIDE, PRESERVATIVE FREE 10 ML: 5 INJECTION INTRAVENOUS at 08:20

## 2024-01-12 RX ADMIN — HEPARIN SODIUM 14 UNITS/KG/HR: 10000 INJECTION, SOLUTION INTRAVENOUS at 12:12

## 2024-01-12 RX ADMIN — SODIUM CHLORIDE 3000 ML: 900 SOLUTION EXTRACORPOREAL at 01:42

## 2024-01-12 RX ADMIN — SODIUM CHLORIDE 3000 ML: 900 SOLUTION EXTRACORPOREAL at 09:30

## 2024-01-12 RX ADMIN — SODIUM CHLORIDE: 9 INJECTION, SOLUTION INTRAVENOUS at 08:19

## 2024-01-12 RX ADMIN — CARVEDILOL 6.25 MG: 6.25 TABLET, FILM COATED ORAL at 17:51

## 2024-01-12 RX ADMIN — HEPARIN SODIUM 14 UNITS/KG/HR: 10000 INJECTION, SOLUTION INTRAVENOUS at 23:47

## 2024-01-12 RX ADMIN — ACETAMINOPHEN 650 MG: 325 TABLET ORAL at 14:45

## 2024-01-12 RX ADMIN — HEPARIN SODIUM 14 UNITS/KG/HR: 10000 INJECTION, SOLUTION INTRAVENOUS at 01:03

## 2024-01-12 RX ADMIN — Medication 2000 MG: at 18:24

## 2024-01-12 RX ADMIN — ACETAMINOPHEN 650 MG: 325 TABLET ORAL at 08:20

## 2024-01-12 RX ADMIN — BENZONATATE 200 MG: 100 CAPSULE ORAL at 22:26

## 2024-01-12 RX ADMIN — SODIUM CHLORIDE, PRESERVATIVE FREE 10 ML: 5 INJECTION INTRAVENOUS at 19:42

## 2024-01-12 RX ADMIN — SODIUM CHLORIDE 3000 ML: 900 SOLUTION EXTRACORPOREAL at 05:47

## 2024-01-12 RX ADMIN — LEVOFLOXACIN 500 MG: 5 INJECTION, SOLUTION INTRAVENOUS at 16:13

## 2024-01-12 RX ADMIN — SODIUM CHLORIDE 3000 ML: 900 SOLUTION EXTRACORPOREAL at 22:10

## 2024-01-12 RX ADMIN — SODIUM CHLORIDE, PRESERVATIVE FREE 40 MG: 5 INJECTION INTRAVENOUS at 08:19

## 2024-01-12 RX ADMIN — SODIUM CHLORIDE 3000 ML: 900 SOLUTION EXTRACORPOREAL at 11:56

## 2024-01-12 RX ADMIN — ATORVASTATIN CALCIUM 40 MG: 40 TABLET, FILM COATED ORAL at 19:42

## 2024-01-12 ASSESSMENT — PAIN SCALES - GENERAL
PAINLEVEL_OUTOF10: 0
PAINLEVEL_OUTOF10: 3
PAINLEVEL_OUTOF10: 3
PAINLEVEL_OUTOF10: 0

## 2024-01-12 ASSESSMENT — PAIN DESCRIPTION - DESCRIPTORS
DESCRIPTORS: ACHING
DESCRIPTORS: ACHING

## 2024-01-12 ASSESSMENT — PAIN DESCRIPTION - ORIENTATION
ORIENTATION: RIGHT
ORIENTATION: UPPER

## 2024-01-12 ASSESSMENT — PAIN DESCRIPTION - LOCATION
LOCATION: KNEE
LOCATION: HEAD

## 2024-01-12 ASSESSMENT — PAIN - FUNCTIONAL ASSESSMENT: PAIN_FUNCTIONAL_ASSESSMENT: ACTIVITIES ARE NOT PREVENTED

## 2024-01-12 NOTE — RESULT ENCOUNTER NOTE
Patient transferred to [facility] ICU. Infectious disease consulted due to positive urine cultures per notes.

## 2024-01-12 NOTE — H&P
Critical Care - History and Physical Examination    Patient's name:  Jose Maria Ge  Medical Record Number: [id number]  Patient's account/billing number: [id number]  Patient's YOB: 1955  Age: 68 y.o.  Date of Admission: 1/9/2024 12:09 AM  Date of History and Physical Examination: 1/9/2024    Primary Care Physician: No primary care provider on file.  Attending Physician: Marcus Thakkar MD     Code Status: No Order    Chief complaint:   Chief Complaint   Patient presents with    Tachycardia    Chest Pain     Staley catheter problem       HISTORY OF PRESENT ILLNESS:   Jose Maria Ge is a 68 y.o. male presented as a transfer from [facility] emergency department.  Past medical history significant for BPH, hypertension as well as acute renal failure.  Patient was originally seen at [facility] emergency department after presenting acute urinary retention.  Patient was having lower abdominal pain.  Was found to have almost a liter in his bladder.  They were unable to place a Staley there and so he was transferred here for urology consultation.  Patient has been admitted to [facility] for something similar that resulted in acute renal failure.  Patient while at [facility] emergency department went into SVT.  This was not worked up there.  In the emergency department here patient having a D-dimer greater than 20,000.  CT PE study showing a saddle pulmonary embolism with no evidence of right heart strain.  CT abdomen pelvis showing what could be a mass in the bladder versus hemorrhage.  Urology placed Staley in the emergency department.  Vascular surgery consulted for the saddle pulmonary embolism they plan on taking patient to the OR for thrombectomy first thing this morning.    Past Medical History:  No past medical history on file.    Past Surgical History:  No past surgical history on file.    Allergies:    No Known Allergies      Home Meds:   Prior to Admission medications    Not on File       Social 
extending from the bifurcation of the main pulmonary artery through the left main pulmonary artery and through the lower lobar branches all the way to the distal portions of upper lobar branches of left pulmonary artery. No evidence of right ventricular strain. 2. Markedly enlarged prostate. Large mass in the bladder, likely to be due to markedly enlarged prostate protruding into most of the bladder cavity. Primary bladder neoplasm cannot be excluded.  Hemorrhage in bladder cannot be excluded.  Moderate to marked thickening of the bladder wall.  Moderate to marked stranding and hazy densities around the thickened bladder wall. Ongoing cystitis not excluded. 3. Moderate bilateral hydronephrosis and hydroureter.  No urinary calculus. 4. Mild atelectatic changes at the posterior right lung base. 5. Mild cardiomegaly. 6. Other incidental findings as above. The findings have been discussed by me with Dr. Caitlyn Garcia at 4:03 a.m. on 01/09/2024.     XR CHEST PORTABLE    Result Date: 1/8/2024  Patchy opacities in the right lower lung, atelectasis versus pneumonia, correlate clinically.       Assessment :      Hospital Problems             Last Modified POA    * (Principal) Acute massive pulmonary embolism (HCC) 1/9/2024 Yes    Acute urinary retention 1/9/2024 Yes    BPH (benign prostatic hyperplasia) 1/9/2024 Yes    Hematuria 1/9/2024 Yes    UTI (urinary tract infection) 1/9/2024 Yes    Hypertension 1/9/2024 Yes    Hydronephrosis 1/9/2024 Yes    Obstructive uropathy 1/9/2024 Yes    Acute saddle pulmonary embolism with acute cor pulmonale (HCC) 1/9/2024 Yes    Renal failure syndrome 1/9/2024 Yes    Pyelonephritis 1/9/2024 Yes    Bandemia 1/9/2024 Yes    Septicemia due to Klebsiella pneumoniae (HCC) 1/9/2024 Yes    CHRIS (acute kidney injury) ([facility]) 1/10/2024 Yes    Sepsis due to Klebsiella ([facility]) 1/10/2024 Yes    Persistent fever 1/11/2024 Yes       Plan:     Patient status inpatient in the Progressive Unit/Step 
no dysuria, no frequency, and no hematuria.

## 2024-01-12 NOTE — CARE COORDINATION
Transitional planning. CBI- 2L NC, plans for OP scope per urology, PT/OT ordered. Plans to return to [facility] when dc'd. Will need new pre-cert- referral faxed. Pt's WC in room

## 2024-01-13 LAB
ANION GAP SERPL CALCULATED.3IONS-SCNC: 10 MMOL/L (ref 9–17)
ANTI-XA UNFRAC HEPARIN: 0.55 IU/L
BASOPHILS # BLD: 0.09 K/UL (ref 0–0.2)
BASOPHILS NFR BLD: 1 % (ref 0–2)
BUN SERPL-MCNC: 20 MG/DL (ref 8–23)
CALCIUM SERPL-MCNC: 7.4 MG/DL (ref 8.6–10.4)
CHLORIDE SERPL-SCNC: 102 MMOL/L (ref 98–107)
CO2 SERPL-SCNC: 23 MMOL/L (ref 20–31)
CREAT SERPL-MCNC: 1.2 MG/DL (ref 0.7–1.2)
EOSINOPHIL # BLD: 0.18 K/UL (ref 0–0.44)
EOSINOPHILS RELATIVE PERCENT: 2 % (ref 1–4)
ERYTHROCYTE [DISTWIDTH] IN BLOOD BY AUTOMATED COUNT: 14.1 % (ref 11.8–14.4)
GFR SERPL CREATININE-BSD FRML MDRD: >60 ML/MIN/1.73M2
GLUCOSE SERPL-MCNC: 106 MG/DL (ref 70–99)
HCT VFR BLD AUTO: 20 % (ref 40.7–50.3)
HCT VFR BLD AUTO: 25.1 % (ref 40.7–50.3)
HCT VFR BLD AUTO: 25.4 % (ref 40.7–50.3)
HCT VFR BLD AUTO: 26 % (ref 40.7–50.3)
HGB BLD-MCNC: 6.7 G/DL (ref 13–17)
HGB BLD-MCNC: 7.8 G/DL (ref 13–17)
HGB BLD-MCNC: 7.9 G/DL (ref 13–17)
HGB BLD-MCNC: 8.3 G/DL (ref 13–17)
IMM GRANULOCYTES # BLD AUTO: 0.09 K/UL (ref 0–0.3)
IMM GRANULOCYTES NFR BLD: 1 %
LYMPHOCYTES NFR BLD: 1.09 K/UL (ref 1.1–3.7)
LYMPHOCYTES RELATIVE PERCENT: 12 % (ref 24–43)
MCH RBC QN AUTO: 27.6 PG (ref 25.2–33.5)
MCHC RBC AUTO-ENTMCNC: 31.1 G/DL (ref 28.4–34.8)
MCV RBC AUTO: 88.8 FL (ref 82.6–102.9)
MONOCYTES NFR BLD: 0.91 K/UL (ref 0.1–1.2)
MONOCYTES NFR BLD: 10 % (ref 3–12)
MORPHOLOGY: NORMAL
NEUTROPHILS NFR BLD: 74 % (ref 36–65)
NEUTS SEG NFR BLD: 6.74 K/UL (ref 1.5–8.1)
NRBC BLD-RTO: 0 PER 100 WBC
PLATELET # BLD AUTO: 228 K/UL (ref 138–453)
PMV BLD AUTO: 10.2 FL (ref 8.1–13.5)
POTASSIUM SERPL-SCNC: 4.7 MMOL/L (ref 3.7–5.3)
RBC # BLD AUTO: 2.86 M/UL (ref 4.21–5.77)
SODIUM SERPL-SCNC: 135 MMOL/L (ref 135–144)
WBC OTHER # BLD: 9.1 K/UL (ref 3.5–11.3)

## 2024-01-13 PROCEDURE — 6360000002 HC RX W HCPCS: Performed by: STUDENT IN AN ORGANIZED HEALTH CARE EDUCATION/TRAINING PROGRAM

## 2024-01-13 PROCEDURE — 6370000000 HC RX 637 (ALT 250 FOR IP): Performed by: STUDENT IN AN ORGANIZED HEALTH CARE EDUCATION/TRAINING PROGRAM

## 2024-01-13 PROCEDURE — 36415 COLL VENOUS BLD VENIPUNCTURE: CPT

## 2024-01-13 PROCEDURE — 85018 HEMOGLOBIN: CPT

## 2024-01-13 PROCEDURE — 6360000002 HC RX W HCPCS: Performed by: INTERNAL MEDICINE

## 2024-01-13 PROCEDURE — 2580000003 HC RX 258: Performed by: PHYSICIAN ASSISTANT

## 2024-01-13 PROCEDURE — 2580000003 HC RX 258: Performed by: ANESTHESIOLOGY

## 2024-01-13 PROCEDURE — 99232 SBSQ HOSP IP/OBS MODERATE 35: CPT | Performed by: STUDENT IN AN ORGANIZED HEALTH CARE EDUCATION/TRAINING PROGRAM

## 2024-01-13 PROCEDURE — 85520 HEPARIN ASSAY: CPT

## 2024-01-13 PROCEDURE — 99233 SBSQ HOSP IP/OBS HIGH 50: CPT | Performed by: INTERNAL MEDICINE

## 2024-01-13 PROCEDURE — 85014 HEMATOCRIT: CPT

## 2024-01-13 PROCEDURE — 80048 BASIC METABOLIC PNL TOTAL CA: CPT

## 2024-01-13 PROCEDURE — 85025 COMPLETE CBC W/AUTO DIFF WBC: CPT

## 2024-01-13 PROCEDURE — 2060000000 HC ICU INTERMEDIATE R&B

## 2024-01-13 PROCEDURE — 2580000003 HC RX 258: Performed by: STUDENT IN AN ORGANIZED HEALTH CARE EDUCATION/TRAINING PROGRAM

## 2024-01-13 PROCEDURE — 99232 SBSQ HOSP IP/OBS MODERATE 35: CPT | Performed by: INTERNAL MEDICINE

## 2024-01-13 RX ORDER — LEVOFLOXACIN 750 MG/1
750 TABLET, FILM COATED ORAL DAILY
Qty: 15 TABLET | Refills: 0 | Status: ON HOLD | OUTPATIENT
Start: 2024-01-13 | End: 2024-01-28

## 2024-01-13 RX ADMIN — ATORVASTATIN CALCIUM 40 MG: 40 TABLET, FILM COATED ORAL at 19:41

## 2024-01-13 RX ADMIN — SODIUM CHLORIDE, PRESERVATIVE FREE 10 ML: 5 INJECTION INTRAVENOUS at 09:50

## 2024-01-13 RX ADMIN — CARVEDILOL 6.25 MG: 6.25 TABLET, FILM COATED ORAL at 09:49

## 2024-01-13 RX ADMIN — PANTOPRAZOLE SODIUM 40 MG: 40 TABLET, DELAYED RELEASE ORAL at 06:20

## 2024-01-13 RX ADMIN — SODIUM CHLORIDE 3000 ML: 900 SOLUTION EXTRACORPOREAL at 06:20

## 2024-01-13 RX ADMIN — CARVEDILOL 6.25 MG: 6.25 TABLET, FILM COATED ORAL at 18:05

## 2024-01-13 RX ADMIN — Medication 2000 MG: at 18:06

## 2024-01-13 RX ADMIN — LEVOFLOXACIN 500 MG: 5 INJECTION, SOLUTION INTRAVENOUS at 16:08

## 2024-01-13 RX ADMIN — HEPARIN SODIUM 14 UNITS/KG/HR: 10000 INJECTION, SOLUTION INTRAVENOUS at 12:11

## 2024-01-13 RX ADMIN — ACETAMINOPHEN 650 MG: 325 TABLET ORAL at 09:49

## 2024-01-13 ASSESSMENT — PAIN SCALES - GENERAL: PAINLEVEL_OUTOF10: 8

## 2024-01-13 ASSESSMENT — PAIN DESCRIPTION - LOCATION: LOCATION: HEAD

## 2024-01-14 LAB
ANION GAP SERPL CALCULATED.3IONS-SCNC: 10 MMOL/L (ref 9–17)
ANTI-XA UNFRAC HEPARIN: 0.5 IU/L
BASOPHILS # BLD: 0.11 K/UL (ref 0–0.2)
BASOPHILS NFR BLD: 1 % (ref 0–2)
BUN SERPL-MCNC: 15 MG/DL (ref 8–23)
CALCIUM SERPL-MCNC: 7.3 MG/DL (ref 8.6–10.4)
CHLORIDE SERPL-SCNC: 102 MMOL/L (ref 98–107)
CO2 SERPL-SCNC: 23 MMOL/L (ref 20–31)
CREAT SERPL-MCNC: 1 MG/DL (ref 0.7–1.2)
EOSINOPHIL # BLD: 0.21 K/UL (ref 0–0.44)
EOSINOPHILS RELATIVE PERCENT: 2 % (ref 1–4)
ERYTHROCYTE [DISTWIDTH] IN BLOOD BY AUTOMATED COUNT: 13.9 % (ref 11.8–14.4)
GFR SERPL CREATININE-BSD FRML MDRD: >60 ML/MIN/1.73M2
GLUCOSE SERPL-MCNC: 108 MG/DL (ref 70–99)
HCT VFR BLD AUTO: 25.1 % (ref 40.7–50.3)
HCT VFR BLD AUTO: 27.1 % (ref 40.7–50.3)
HCT VFR BLD AUTO: 27.7 % (ref 40.7–50.3)
HGB BLD-MCNC: 7.7 G/DL (ref 13–17)
HGB BLD-MCNC: 8.4 G/DL (ref 13–17)
HGB BLD-MCNC: 8.6 G/DL (ref 13–17)
IMM GRANULOCYTES # BLD AUTO: 0.21 K/UL (ref 0–0.3)
IMM GRANULOCYTES NFR BLD: 2 %
LYMPHOCYTES NFR BLD: 1.68 K/UL (ref 1.1–3.7)
LYMPHOCYTES RELATIVE PERCENT: 16 % (ref 24–43)
MCH RBC QN AUTO: 27.6 PG (ref 25.2–33.5)
MCHC RBC AUTO-ENTMCNC: 30.7 G/DL (ref 28.4–34.8)
MCV RBC AUTO: 90 FL (ref 82.6–102.9)
MONOCYTES NFR BLD: 0.95 K/UL (ref 0.1–1.2)
MONOCYTES NFR BLD: 9 % (ref 3–12)
MORPHOLOGY: NORMAL
NEUTROPHILS NFR BLD: 70 % (ref 36–65)
NEUTS SEG NFR BLD: 7.34 K/UL (ref 1.5–8.1)
NRBC BLD-RTO: 0 PER 100 WBC
PLATELET # BLD AUTO: 241 K/UL (ref 138–453)
PMV BLD AUTO: 10.4 FL (ref 8.1–13.5)
POTASSIUM SERPL-SCNC: 4.2 MMOL/L (ref 3.7–5.3)
RBC # BLD AUTO: 2.79 M/UL (ref 4.21–5.77)
SODIUM SERPL-SCNC: 135 MMOL/L (ref 135–144)
WBC OTHER # BLD: 10.5 K/UL (ref 3.5–11.3)

## 2024-01-14 PROCEDURE — 85520 HEPARIN ASSAY: CPT

## 2024-01-14 PROCEDURE — 97530 THERAPEUTIC ACTIVITIES: CPT

## 2024-01-14 PROCEDURE — 99232 SBSQ HOSP IP/OBS MODERATE 35: CPT | Performed by: STUDENT IN AN ORGANIZED HEALTH CARE EDUCATION/TRAINING PROGRAM

## 2024-01-14 PROCEDURE — 85014 HEMATOCRIT: CPT

## 2024-01-14 PROCEDURE — 85025 COMPLETE CBC W/AUTO DIFF WBC: CPT

## 2024-01-14 PROCEDURE — 51702 INSERT TEMP BLADDER CATH: CPT

## 2024-01-14 PROCEDURE — 80048 BASIC METABOLIC PNL TOTAL CA: CPT

## 2024-01-14 PROCEDURE — 97166 OT EVAL MOD COMPLEX 45 MIN: CPT

## 2024-01-14 PROCEDURE — 97162 PT EVAL MOD COMPLEX 30 MIN: CPT

## 2024-01-14 PROCEDURE — 2060000000 HC ICU INTERMEDIATE R&B

## 2024-01-14 PROCEDURE — 85018 HEMOGLOBIN: CPT

## 2024-01-14 PROCEDURE — 6370000000 HC RX 637 (ALT 250 FOR IP): Performed by: STUDENT IN AN ORGANIZED HEALTH CARE EDUCATION/TRAINING PROGRAM

## 2024-01-14 PROCEDURE — 2580000003 HC RX 258: Performed by: ANESTHESIOLOGY

## 2024-01-14 PROCEDURE — 36415 COLL VENOUS BLD VENIPUNCTURE: CPT

## 2024-01-14 PROCEDURE — 2580000003 HC RX 258: Performed by: STUDENT IN AN ORGANIZED HEALTH CARE EDUCATION/TRAINING PROGRAM

## 2024-01-14 PROCEDURE — 99232 SBSQ HOSP IP/OBS MODERATE 35: CPT | Performed by: INTERNAL MEDICINE

## 2024-01-14 PROCEDURE — 6360000002 HC RX W HCPCS: Performed by: INTERNAL MEDICINE

## 2024-01-14 RX ADMIN — ONDANSETRON 4 MG: 4 TABLET, ORALLY DISINTEGRATING ORAL at 10:05

## 2024-01-14 RX ADMIN — Medication 2000 MG: at 17:14

## 2024-01-14 RX ADMIN — LEVOFLOXACIN 500 MG: 5 INJECTION, SOLUTION INTRAVENOUS at 15:38

## 2024-01-14 RX ADMIN — SODIUM CHLORIDE, PRESERVATIVE FREE 10 ML: 5 INJECTION INTRAVENOUS at 20:20

## 2024-01-14 RX ADMIN — APIXABAN 10 MG: 5 TABLET, FILM COATED ORAL at 20:20

## 2024-01-14 RX ADMIN — PANTOPRAZOLE SODIUM 40 MG: 40 TABLET, DELAYED RELEASE ORAL at 07:47

## 2024-01-14 RX ADMIN — CARVEDILOL 6.25 MG: 6.25 TABLET, FILM COATED ORAL at 17:19

## 2024-01-14 RX ADMIN — ACETAMINOPHEN 650 MG: 325 TABLET ORAL at 04:45

## 2024-01-14 RX ADMIN — ATORVASTATIN CALCIUM 40 MG: 40 TABLET, FILM COATED ORAL at 20:20

## 2024-01-14 RX ADMIN — SODIUM CHLORIDE, PRESERVATIVE FREE 10 ML: 5 INJECTION INTRAVENOUS at 09:16

## 2024-01-14 RX ADMIN — CARVEDILOL 6.25 MG: 6.25 TABLET, FILM COATED ORAL at 07:49

## 2024-01-14 RX ADMIN — APIXABAN 10 MG: 5 TABLET, FILM COATED ORAL at 13:05

## 2024-01-15 LAB
ANION GAP SERPL CALCULATED.3IONS-SCNC: 7 MMOL/L (ref 9–17)
BASOPHILS # BLD: 0 K/UL (ref 0–0.2)
BASOPHILS NFR BLD: 0 % (ref 0–2)
BUN SERPL-MCNC: 12 MG/DL (ref 8–23)
CALCIUM SERPL-MCNC: 7.7 MG/DL (ref 8.6–10.4)
CHLORIDE SERPL-SCNC: 101 MMOL/L (ref 98–107)
CO2 SERPL-SCNC: 25 MMOL/L (ref 20–31)
CREAT SERPL-MCNC: 1 MG/DL (ref 0.7–1.2)
EOSINOPHIL # BLD: 0.35 K/UL (ref 0–0.4)
EOSINOPHILS RELATIVE PERCENT: 3 % (ref 1–4)
ERYTHROCYTE [DISTWIDTH] IN BLOOD BY AUTOMATED COUNT: 13.9 % (ref 11.8–14.4)
GFR SERPL CREATININE-BSD FRML MDRD: >60 ML/MIN/1.73M2
GLUCOSE SERPL-MCNC: 104 MG/DL (ref 70–99)
HCT VFR BLD AUTO: 25.7 % (ref 40.7–50.3)
HCT VFR BLD AUTO: 26.5 % (ref 40.7–50.3)
HCT VFR BLD AUTO: 29.2 % (ref 40.7–50.3)
HGB BLD-MCNC: 7.9 G/DL (ref 13–17)
HGB BLD-MCNC: 8 G/DL (ref 13–17)
HGB BLD-MCNC: 8.3 G/DL (ref 13–17)
IMM GRANULOCYTES # BLD AUTO: 0.12 K/UL (ref 0–0.3)
IMM GRANULOCYTES NFR BLD: 1 %
LYMPHOCYTES NFR BLD: 1.27 K/UL (ref 1–4.8)
LYMPHOCYTES RELATIVE PERCENT: 11 % (ref 24–44)
MCH RBC QN AUTO: 27.5 PG (ref 25.2–33.5)
MCHC RBC AUTO-ENTMCNC: 30.7 G/DL (ref 28.4–34.8)
MCV RBC AUTO: 89.5 FL (ref 82.6–102.9)
MONOCYTES NFR BLD: 0.35 K/UL (ref 0.1–0.8)
MONOCYTES NFR BLD: 3 % (ref 1–7)
MORPHOLOGY: NORMAL
NEUTROPHILS NFR BLD: 82 % (ref 36–66)
NEUTS SEG NFR BLD: 9.41 K/UL (ref 1.8–7.7)
NRBC BLD-RTO: 0 PER 100 WBC
PLATELET # BLD AUTO: 243 K/UL (ref 138–453)
PMV BLD AUTO: 9.7 FL (ref 8.1–13.5)
POTASSIUM SERPL-SCNC: 4.3 MMOL/L (ref 3.7–5.3)
RBC # BLD AUTO: 2.87 M/UL (ref 4.21–5.77)
SODIUM SERPL-SCNC: 133 MMOL/L (ref 135–144)
WBC OTHER # BLD: 11.5 K/UL (ref 3.5–11.3)

## 2024-01-15 PROCEDURE — 99232 SBSQ HOSP IP/OBS MODERATE 35: CPT | Performed by: INTERNAL MEDICINE

## 2024-01-15 PROCEDURE — 2580000003 HC RX 258: Performed by: ANESTHESIOLOGY

## 2024-01-15 PROCEDURE — 6360000002 HC RX W HCPCS: Performed by: STUDENT IN AN ORGANIZED HEALTH CARE EDUCATION/TRAINING PROGRAM

## 2024-01-15 PROCEDURE — 85018 HEMOGLOBIN: CPT

## 2024-01-15 PROCEDURE — 36415 COLL VENOUS BLD VENIPUNCTURE: CPT

## 2024-01-15 PROCEDURE — 85014 HEMATOCRIT: CPT

## 2024-01-15 PROCEDURE — 99232 SBSQ HOSP IP/OBS MODERATE 35: CPT | Performed by: STUDENT IN AN ORGANIZED HEALTH CARE EDUCATION/TRAINING PROGRAM

## 2024-01-15 PROCEDURE — 6370000000 HC RX 637 (ALT 250 FOR IP): Performed by: STUDENT IN AN ORGANIZED HEALTH CARE EDUCATION/TRAINING PROGRAM

## 2024-01-15 PROCEDURE — 80048 BASIC METABOLIC PNL TOTAL CA: CPT

## 2024-01-15 PROCEDURE — 85025 COMPLETE CBC W/AUTO DIFF WBC: CPT

## 2024-01-15 PROCEDURE — 2580000003 HC RX 258: Performed by: STUDENT IN AN ORGANIZED HEALTH CARE EDUCATION/TRAINING PROGRAM

## 2024-01-15 PROCEDURE — 76937 US GUIDE VASCULAR ACCESS: CPT

## 2024-01-15 PROCEDURE — 2709999900 HC NON-CHARGEABLE SUPPLY

## 2024-01-15 PROCEDURE — 2060000000 HC ICU INTERMEDIATE R&B

## 2024-01-15 PROCEDURE — 6360000002 HC RX W HCPCS: Performed by: INTERNAL MEDICINE

## 2024-01-15 RX ORDER — POLYETHYLENE GLYCOL 3350 17 G/17G
17 POWDER, FOR SOLUTION ORAL DAILY PRN
Qty: 30 PACKET | Refills: 0 | Status: ON HOLD | OUTPATIENT
Start: 2024-01-15 | End: 2024-02-14

## 2024-01-15 RX ORDER — CARVEDILOL 6.25 MG/1
6.25 TABLET ORAL 2 TIMES DAILY WITH MEALS
Qty: 60 TABLET | Refills: 3 | Status: ON HOLD | OUTPATIENT
Start: 2024-01-15

## 2024-01-15 RX ORDER — PANTOPRAZOLE SODIUM 40 MG/1
40 TABLET, DELAYED RELEASE ORAL
Qty: 30 TABLET | Refills: 3 | Status: ON HOLD | OUTPATIENT
Start: 2024-01-16

## 2024-01-15 RX ORDER — LEVOFLOXACIN 750 MG/1
750 TABLET, FILM COATED ORAL DAILY
Status: DISCONTINUED | OUTPATIENT
Start: 2024-01-16 | End: 2024-01-16 | Stop reason: HOSPADM

## 2024-01-15 RX ORDER — ATORVASTATIN CALCIUM 40 MG/1
40 TABLET, FILM COATED ORAL NIGHTLY
Qty: 30 TABLET | Refills: 3 | Status: ON HOLD | OUTPATIENT
Start: 2024-01-15

## 2024-01-15 RX ORDER — OXYBUTYNIN CHLORIDE 5 MG/1
5 TABLET, EXTENDED RELEASE ORAL NIGHTLY PRN
Qty: 30 TABLET | Refills: 3 | Status: ON HOLD | OUTPATIENT
Start: 2024-01-15

## 2024-01-15 RX ADMIN — SODIUM CHLORIDE: 9 INJECTION, SOLUTION INTRAVENOUS at 15:32

## 2024-01-15 RX ADMIN — PANTOPRAZOLE SODIUM 40 MG: 40 TABLET, DELAYED RELEASE ORAL at 09:05

## 2024-01-15 RX ADMIN — SODIUM CHLORIDE, PRESERVATIVE FREE 10 ML: 5 INJECTION INTRAVENOUS at 09:07

## 2024-01-15 RX ADMIN — ATORVASTATIN CALCIUM 40 MG: 40 TABLET, FILM COATED ORAL at 22:14

## 2024-01-15 RX ADMIN — LEVOFLOXACIN 500 MG: 5 INJECTION, SOLUTION INTRAVENOUS at 15:33

## 2024-01-15 RX ADMIN — SODIUM CHLORIDE, PRESERVATIVE FREE 10 ML: 5 INJECTION INTRAVENOUS at 22:14

## 2024-01-15 RX ADMIN — Medication 2000 MG: at 16:32

## 2024-01-15 RX ADMIN — APIXABAN 10 MG: 5 TABLET, FILM COATED ORAL at 22:14

## 2024-01-15 RX ADMIN — ONDANSETRON 4 MG: 2 INJECTION INTRAMUSCULAR; INTRAVENOUS at 00:01

## 2024-01-15 RX ADMIN — CARVEDILOL 6.25 MG: 6.25 TABLET, FILM COATED ORAL at 18:17

## 2024-01-15 RX ADMIN — HYOSCYAMINE SULFATE 125 MCG: 0.12 TABLET ORAL; SUBLINGUAL at 22:33

## 2024-01-15 RX ADMIN — CARVEDILOL 6.25 MG: 6.25 TABLET, FILM COATED ORAL at 09:05

## 2024-01-15 RX ADMIN — APIXABAN 10 MG: 5 TABLET, FILM COATED ORAL at 09:05

## 2024-01-15 ASSESSMENT — ENCOUNTER SYMPTOMS
EYE PAIN: 0
ABDOMINAL DISTENTION: 0
SHORTNESS OF BREATH: 1
BACK PAIN: 0
EYES NEGATIVE: 1
COLOR CHANGE: 0
ABDOMINAL PAIN: 0
GASTROINTESTINAL NEGATIVE: 1
EYE DISCHARGE: 0
CHEST TIGHTNESS: 0
APNEA: 0
EYE REDNESS: 0
ALLERGIC/IMMUNOLOGIC NEGATIVE: 1
CHOKING: 0

## 2024-01-15 ASSESSMENT — PAIN SCALES - GENERAL
PAINLEVEL_OUTOF10: 0
PAINLEVEL_OUTOF10: 0

## 2024-01-15 NOTE — CONSULTS
Midline catheter placement    Reason for placement: Per ID: 3 weeks iv AB for the sepsis and pyelo, till 1/28   Product type: Vygon Leaderflex Catheter - NOT POWER INJECTABLE  Location: right cephalic vein.   Vessel measurement -  0.32 cm. CVR is 4.3% (Preferred catheter to vein ratio is less than 20% aerial based. This correlates with a 45% linear based measurement).   History/Labs/Allergies Reviewed  Placed By: Raymond REDDING RN  Assisted By NA  Time out Performed using Two Identifiers  Lot #: [id number]  Expiration date: 2026-03-31  Catheter size: 2.1 Danish (22 gauge)  Catheter length: 8 cm  External catheter length: 0 cm  Number of attempts 1  Estimated blood loss: 0ml  Placement verified by- positive blood return & flushes easily  Special equipment used- ultrasound & seldinger technique   Catheter secured with  Lock  Dressing applied- Tegaderm CHG      Post insertion education:  Post care line insertion was discussed with patient/Family or POA prior to procedure.  Risks, benefits, alternatives, and reason for procedure were discussed and teaching was reinforced. An educational handout on post insertion line care and maintenance was left at bedside with patient or in chart. Patient (Family or POA) acknowledged understanding of information relayed.         
Renal Consult Note    Patient :  Jose Maria Ge; 68 y.o. MRN# [id number]  Location:  3010/3010-01  Attending:  Marcus Thakkar MD  Admit Date:  1/9/2024   Hospital Day: 0    Reason for Consult:     Asked by Marcus Desai MD to see for CHRIS/Elevated Creatinine.    History Obtained From:     Patient/chart    History of Present Illness:     Jose Maria Ge; 68 y.o. male with past medical history as mentioned below.  He also has known history of morbid obesity, BPH, hypertension, recent episode of acute renal failure from obstructive uropathy required admission to [facility] 12/26/2023, his creatinine had gone up to 30.  Imaging had shown bilateral hydronephrosis and significantly large prostate protruding into the bladder.  Keys catheter was placed creatinine came down to 1.1 and he was discharged to a skilled nursing facility 1/3/23.  According to the patient the catheter was hurting him and he was unable to ambulate so the Keys was removed at the [facility] facility.  Subsequently he was unable to urinate and then was transferred to [facility].  He was found to have urinary retention with bladder volume of more than a liter, they were unable to place Keys.  He then developed tachycardia.  CT PE showed large left pulmonary embolism without RV strain.  CT abdominal and pelvis with contrast showed massively enlarged prostate protruding into the bladder, thickening of bladder wall bilateral hydronephrosis.  He was then transferred to [facility] for further care.  Urology was consulted and keys was placed.  He then underwent mechanical thrombectomy last night 1/9/2024 and was placed on heparin.  He has now been having significant hematuria with felipe blood in his Keys catheter.  Labs also showed a creatinine of 4.4 and nephrology was consulted for acute kidney injury.  At the time evaluation patient is unable to give me a clear chronology of events.  He denies any complaints today.  No shortness of breath 
bladder cannot be  excluded. There is moderate to marked thickening of the bladder wall moderate  to marked stranding and hazy densities around the thickened bladder wall.  Ongoing cystitis not excluded.  3. There is moderate bilateral hydronephrosis and hydroureter.  No urinary  calculus.  4. There is mild atelectatic changes at the posterior right lung base.  5. Mild cardiomegaly.  6. Other incidental findings as above.     Assessment:   Jose Maria Ge is a 68 y.o.  male who presented due to urinary retention  Found to have large PE, predominantly left sided   Plan:     Heparin gtt  Keep NPO, sips with meds  Monitor hemodynamics closely  Plan for mechanical pulmonary artery thrombectomy     ----------------------------------------  Ale Beckham, DO  General Surgery PGY-5                 
actually reflects the content of the visit, the document can still have some errors including those of syntax and sound a like substitutions which may escape proof reading.  It such instances, actual meaningcan be extrapolated by contextual diversion.    Diane De La O MD  Office: [phone]  Perfect serve / office [phone]          
midportion and distal portion of right pulmonary artery there is no pulmonary emboli.  No definable pulmonary emboli in the rest of the branches of the right pulmonary artery. There is no significant reflux of contrast into the inferior vena cava and therefore no definite evidence of right ventricular strain. Lungs and pleural spaces: Mild atelectatic changes at the posterior aspect of base of right lung.  In the rest of both lungs, there is no other focal abnormality or acute process.  No pleural effusion or pneumothorax.  Lungs are not hyperinflated. Bony thorax and soft tissues: Definable fracture or focal lesion in the bony thorax including ribs, thoracic spine, sternum, bilateral clavicles and bilateral scapulae.  Evidence of moderate to marked multilevel degenerative disc disease in the thoracic spine with prominent anterior skeletal hyperostosis but without any cysts significant spinal stenosis.     1. Large pulmonary embolism extending from the bifurcation of the main pulmonary artery through the left main pulmonary artery and through the lower lobar branches all the way to the distal portions of upper lobar branches of left pulmonary artery. No evidence of right ventricular strain. 2. Markedly enlarged prostate. Large mass in the bladder, likely to be due to markedly enlarged prostate protruding into most of the bladder cavity. Primary bladder neoplasm cannot be excluded.  Hemorrhage in bladder cannot be excluded.  Moderate to marked thickening of the bladder wall.  Moderate to marked stranding and hazy densities around the thickened bladder wall. Ongoing cystitis not excluded. 3. Moderate bilateral hydronephrosis and hydroureter.  No urinary calculus. 4. Mild atelectatic changes at the posterior right lung base. 5. Mild cardiomegaly. 6. Other incidental findings as above.     XR CHEST PORTABLE    Result Date: 1/9/2024  EXAMINATION: ONE XRAY VIEW OF THE CHEST PORTABLE UPRIGHT AP VIEW OF CHEST 1/8/2024 11:30

## 2024-01-15 NOTE — DISCHARGE INSTR - COC
Continuity of Care Form    Patient Name: Jose Maria Ge   :  1955  MRN:  [id number]    Admit date:  2024  Discharge date:  24    Code Status Order: Full Code   Advance Directives:     Admitting Physician:  Marcus Thakkar MD  PCP: No primary care provider on file.    Discharging Nurse: Edna   Discharging Hospital Unit/Room#:   Discharging Unit Phone Number: [phone]    Emergency Contact:   Extended Emergency Contact Information  Primary Emergency Contact: Matt Ge  Home Phone: [phone]  Relation: Brother/Sister  Secondary Emergency Contact: Nirmala Sun  Home Phone: [phone]  Relation: Brother/Sister  Preferred language: English    Past Surgical History:  Past Surgical History:   Procedure Laterality Date    VASCULAR SURGERY Right 2024    MECHANICAL THROMBECTOMY OF BILATERAL PULMONARY EMBOLISM'S VIA PENUMBRA performed by Aftab Leyva MD at [facility]       Immunization History:   Immunization History   Administered Date(s) Administered    COVID-19, MODERNA BLUE border, Primary or Immunocompromised, (age 12y+), IM, 100 mcg/0.5mL 2021, 2021       Active Problems:  Patient Active Problem List   Diagnosis Code    Acute massive pulmonary embolism ([facility]) I26.99    Acute urinary retention R33.8    BPH (benign prostatic hyperplasia) N40.0    Hematuria R31.9    UTI (urinary tract infection) N39.0    Hypertension I10    Hydronephrosis N13.30    Obstructive uropathy N13.9    Acute saddle pulmonary embolism with acute cor pulmonale ([facility]) I26.02    Renal failure syndrome N19    Pyelonephritis N12    Bandemia D72.825    Septicemia due to Klebsiella pneumoniae ([facility]) A41.4    CHRIS (acute kidney injury) ([facility]) N17.9    Sepsis due to Klebsiella ([facility]) A41.59    Persistent fever R50.9       Isolation/Infection:   Isolation            No Isolation          Patient Infection Status       None to display            Nurse Assessment:  Last Vital Signs: /61   Pulse 72   Temp

## 2024-01-16 VITALS
SYSTOLIC BLOOD PRESSURE: 159 MMHG | OXYGEN SATURATION: 97 % | RESPIRATION RATE: 17 BRPM | DIASTOLIC BLOOD PRESSURE: 78 MMHG | TEMPERATURE: 98.7 F | HEART RATE: 76 BPM | BODY MASS INDEX: 38.9 KG/M2 | WEIGHT: 312.83 LBS | HEIGHT: 75 IN

## 2024-01-16 PROBLEM — B96.1 BACTEREMIA DUE TO KLEBSIELLA PNEUMONIAE: Status: ACTIVE | Noted: 2024-01-16

## 2024-01-16 PROBLEM — R78.81 BACTEREMIA DUE TO KLEBSIELLA PNEUMONIAE: Status: ACTIVE | Noted: 2024-01-16

## 2024-01-16 PROBLEM — Z97.8 FOLEY CATHETER IN PLACE: Status: ACTIVE | Noted: 2024-01-16

## 2024-01-16 LAB
ANION GAP SERPL CALCULATED.3IONS-SCNC: 8 MMOL/L (ref 9–17)
BASOPHILS # BLD: 0 K/UL (ref 0–0.2)
BASOPHILS NFR BLD: 0 % (ref 0–2)
BUN SERPL-MCNC: 10 MG/DL (ref 8–23)
CALCIUM SERPL-MCNC: 7.8 MG/DL (ref 8.6–10.4)
CHLORIDE SERPL-SCNC: 103 MMOL/L (ref 98–107)
CO2 SERPL-SCNC: 24 MMOL/L (ref 20–31)
CREAT SERPL-MCNC: 0.9 MG/DL (ref 0.7–1.2)
EOSINOPHIL # BLD: 0 K/UL (ref 0–0.4)
EOSINOPHILS RELATIVE PERCENT: 0 % (ref 1–4)
ERYTHROCYTE [DISTWIDTH] IN BLOOD BY AUTOMATED COUNT: 14 % (ref 11.8–14.4)
GFR SERPL CREATININE-BSD FRML MDRD: >60 ML/MIN/1.73M2
GLUCOSE SERPL-MCNC: 101 MG/DL (ref 70–99)
HCT VFR BLD AUTO: 26 % (ref 40.7–50.3)
HCT VFR BLD AUTO: 26.2 % (ref 40.7–50.3)
HGB BLD-MCNC: 8 G/DL (ref 13–17)
HGB BLD-MCNC: 8.1 G/DL (ref 13–17)
IMM GRANULOCYTES # BLD AUTO: 0 K/UL (ref 0–0.3)
IMM GRANULOCYTES NFR BLD: 0 %
LYMPHOCYTES NFR BLD: 1.13 K/UL (ref 1–4.8)
LYMPHOCYTES RELATIVE PERCENT: 10 % (ref 24–44)
MCH RBC QN AUTO: 27.6 PG (ref 25.2–33.5)
MCHC RBC AUTO-ENTMCNC: 30.8 G/DL (ref 28.4–34.8)
MCV RBC AUTO: 89.7 FL (ref 82.6–102.9)
MICROORGANISM SPEC CULT: NORMAL
MICROORGANISM SPEC CULT: NORMAL
MONOCYTES NFR BLD: 0.34 K/UL (ref 0.1–0.8)
MONOCYTES NFR BLD: 3 % (ref 1–7)
MORPHOLOGY: NORMAL
NEUTROPHILS NFR BLD: 87 % (ref 36–66)
NEUTS SEG NFR BLD: 9.83 K/UL (ref 1.8–7.7)
NRBC BLD-RTO: 0 PER 100 WBC
PLATELET # BLD AUTO: 280 K/UL (ref 138–453)
PMV BLD AUTO: 9.8 FL (ref 8.1–13.5)
POTASSIUM SERPL-SCNC: 4.3 MMOL/L (ref 3.7–5.3)
RBC # BLD AUTO: 2.9 M/UL (ref 4.21–5.77)
SERVICE CMNT-IMP: NORMAL
SERVICE CMNT-IMP: NORMAL
SODIUM SERPL-SCNC: 135 MMOL/L (ref 135–144)
SPECIMEN DESCRIPTION: NORMAL
SPECIMEN DESCRIPTION: NORMAL
WBC OTHER # BLD: 11.3 K/UL (ref 3.5–11.3)

## 2024-01-16 PROCEDURE — 99232 SBSQ HOSP IP/OBS MODERATE 35: CPT | Performed by: INTERNAL MEDICINE

## 2024-01-16 PROCEDURE — 6370000000 HC RX 637 (ALT 250 FOR IP): Performed by: INTERNAL MEDICINE

## 2024-01-16 PROCEDURE — 97535 SELF CARE MNGMENT TRAINING: CPT

## 2024-01-16 PROCEDURE — 97530 THERAPEUTIC ACTIVITIES: CPT

## 2024-01-16 PROCEDURE — 85018 HEMOGLOBIN: CPT

## 2024-01-16 PROCEDURE — 85025 COMPLETE CBC W/AUTO DIFF WBC: CPT

## 2024-01-16 PROCEDURE — 6370000000 HC RX 637 (ALT 250 FOR IP): Performed by: STUDENT IN AN ORGANIZED HEALTH CARE EDUCATION/TRAINING PROGRAM

## 2024-01-16 PROCEDURE — 2580000003 HC RX 258: Performed by: ANESTHESIOLOGY

## 2024-01-16 PROCEDURE — 99239 HOSP IP/OBS DSCHRG MGMT >30: CPT | Performed by: STUDENT IN AN ORGANIZED HEALTH CARE EDUCATION/TRAINING PROGRAM

## 2024-01-16 PROCEDURE — 85014 HEMATOCRIT: CPT

## 2024-01-16 PROCEDURE — 80048 BASIC METABOLIC PNL TOTAL CA: CPT

## 2024-01-16 PROCEDURE — 36415 COLL VENOUS BLD VENIPUNCTURE: CPT

## 2024-01-16 PROCEDURE — 2580000003 HC RX 258: Performed by: STUDENT IN AN ORGANIZED HEALTH CARE EDUCATION/TRAINING PROGRAM

## 2024-01-16 RX ADMIN — APIXABAN 10 MG: 5 TABLET, FILM COATED ORAL at 09:56

## 2024-01-16 RX ADMIN — SODIUM CHLORIDE, PRESERVATIVE FREE 10 ML: 5 INJECTION INTRAVENOUS at 08:05

## 2024-01-16 RX ADMIN — CARVEDILOL 6.25 MG: 6.25 TABLET, FILM COATED ORAL at 08:08

## 2024-01-16 RX ADMIN — PANTOPRAZOLE SODIUM 40 MG: 40 TABLET, DELAYED RELEASE ORAL at 08:08

## 2024-01-16 RX ADMIN — SODIUM CHLORIDE, PRESERVATIVE FREE 10 ML: 5 INJECTION INTRAVENOUS at 08:06

## 2024-01-16 RX ADMIN — ONDANSETRON 4 MG: 4 TABLET, ORALLY DISINTEGRATING ORAL at 03:47

## 2024-01-16 RX ADMIN — LEVOFLOXACIN 750 MG: 750 TABLET, FILM COATED ORAL at 08:09

## 2024-01-16 ASSESSMENT — ENCOUNTER SYMPTOMS
EYE REDNESS: 0
SHORTNESS OF BREATH: 0
ABDOMINAL PAIN: 0
ALLERGIC/IMMUNOLOGIC NEGATIVE: 1
APNEA: 0
GASTROINTESTINAL NEGATIVE: 1
EYE DISCHARGE: 0
EYES NEGATIVE: 1
CHOKING: 0
BACK PAIN: 0
CHEST TIGHTNESS: 0
ABDOMINAL DISTENTION: 0
COUGH: 0
EYE PAIN: 0
COLOR CHANGE: 0

## 2024-01-16 ASSESSMENT — PAIN SCALES - GENERAL
PAINLEVEL_OUTOF10: 0

## 2024-01-16 NOTE — DISCHARGE SUMMARY
[facility]  Office: [phone]  Lorenzo Sosa DO, Tanner Angulo DO, Quintin Donnelly DO, Mikael Tomas DO, Kale Garcia MD, Jennie Guerrier MD, Marie Abdi MD, Radha Shah MD,  Bernard Corrales MD, Ulices Moura MD, Debra Burns MD,  Papa Hogue DO, Abbi Stevens MD, Darion Dumont MD, Todd Sosa DO, Bre Carlos MD,  William Billingsley DO, Alessandra Bernardo MD, Jamia Bay MD, Cyndi Olivia MD, Dom Ruiz MD,  Wilbert Velasquez MD, aMriama Richards MD, Annita Yao MD, Michelle Ortiz MD, Aguilar Vigil MD, Bishop Mcgee MD, Luis Cook DO, Getachew Oseguera DO, Luna Steward MD,  Jeremiah Charlton MD, Shirley Waterhouse, CNP,  Twyla Elena CNP, Tico Taylor, CNP,  Sara Marinelli, STEVEN, Suzette Liz, CNP, Bonnie Bruno, CNP, Marjorie Samaniego CNP, Izabela Ayala CNP, Meghann Olivia, CNP, Viviane Mcpherson PA-C, Kanwal Nichols PA-C, Mitzi Quintana, CNP, Amina Meyers, CNS, Jacquie Erickson, CNP, Arlette Castellano CNP, Tracy Schwab, CNP         [facility]   IN-PATIENT SERVICE   [facility]    Discharge Summary     Patient ID: Jose Maria Ge  :  1955   MRN: [id number]     ACCOUNT:  [id number]   Patient's PCP: No primary care provider on file.  Admit Date: 2024   Discharge Date: 2024     Length of Stay: 7  Code Status:  Full Code  Admitting Physician: Marcus Thakkar MD  Discharge Physician: Everton Hilliard MD     Active Discharge Diagnoses:     Hospital Problem Lists:  Principal Problem:    Acute massive pulmonary embolism (HCC)  Active Problems:    Acute urinary retention    BPH (benign prostatic hyperplasia)    Hematuria    UTI (urinary tract infection)    Hypertension    Hydronephrosis    Obstructive uropathy    Acute saddle pulmonary embolism with acute cor pulmonale ([facility])    Renal failure syndrome    Pyelonephritis    Bandemia    Septicemia due to Klebsiella pneumoniae ([facility])    CHRIS (acute kidney injury) ([facility])    Sepsis due to Klebsiella

## 2024-01-16 NOTE — CARE COORDINATION
Spoke to Barbra from [facility]. She will have precert started     notified by Barbra that precert has been approved. Transportation request faxed to [facility]    1536 spoke to Simi at [facility]. Patient is scheduled for transportation at 4:30 via [facility]. Patient updated and agreeable. Barbra notified. AVS faxed    Discharge Report    [facility]  Clinical Case Management Department  Written by: Alysia Hedrick RN    Patient Name: Jose Maria Ge  Attending Provider: Everton Hilliard MD  Admit Date: 2024 12:09 AM  MRN: [id number]  Account: [id number]                     : 1955  Discharge Date: 2024      Disposition: [facility]    Alysia Hedrick RN

## 2024-01-16 NOTE — PLAN OF CARE
Problem: Discharge Planning  Goal: Discharge to home or other facility with appropriate resources  1/10/2024 2339 by Jhoana Avila, RN  Outcome: Progressing     Problem: Safety - Adult  Goal: Free from fall injury  1/10/2024 2339 by Jhoana Avila, RN  Outcome: Progressing     Problem: Skin/Tissue Integrity  Goal: Absence of new skin breakdown  Description: 1.  Monitor for areas of redness and/or skin breakdown  2.  Assess vascular access sites hourly  3.  Every 4-6 hours minimum:  Change oxygen saturation probe site  4.  Every 4-6 hours:  If on nasal continuous positive airway pressure, respiratory therapy assess nares and determine need for appliance change or resting period.  1/10/2024 2339 by Jhoana Avila, RN  Outcome: Progressing     Problem: Pain  Goal: Verbalizes/displays adequate comfort level or baseline comfort level  1/10/2024 2339 by Jhoana Avila, RN  Outcome: Progressing     Problem: Chronic Conditions and Co-morbidities  Goal: Patient's chronic conditions and co-morbidity symptoms are monitored and maintained or improved  1/10/2024 2339 by Jhoana Avila, RN  Outcome: Progressing     Problem: ABCDS Injury Assessment  Goal: Absence of physical injury  1/10/2024 2339 by Jhoana Avila, RN  Outcome: Progressing     Problem: Nutrition Deficit:  Goal: Optimize nutritional status  1/10/2024 2339 by Jhoana Avila, RN  Outcome: Progressing     
  Problem: Discharge Planning  Goal: Discharge to home or other facility with appropriate resources  1/11/2024 0346 by Jhoana Avila, RN  Outcome: Progressing     Problem: Safety - Adult  Goal: Free from fall injury  1/11/2024 0346 by Jhoana Avila, RN  Outcome: Progressing     Problem: Skin/Tissue Integrity  Goal: Absence of new skin breakdown  Description: 1.  Monitor for areas of redness and/or skin breakdown  2.  Assess vascular access sites hourly  3.  Every 4-6 hours minimum:  Change oxygen saturation probe site  4.  Every 4-6 hours:  If on nasal continuous positive airway pressure, respiratory therapy assess nares and determine need for appliance change or resting period.  1/11/2024 0346 by Jhoana Avila, RN  Outcome: Progressing     Problem: Pain  Goal: Verbalizes/displays adequate comfort level or baseline comfort level  1/11/2024 0346 by Jhoana Avila, RN  Outcome: Progressing     Problem: Chronic Conditions and Co-morbidities  Goal: Patient's chronic conditions and co-morbidity symptoms are monitored and maintained or improved  1/11/2024 0346 by Jhoana Avila, RN  Outcome: Progressing     Problem: ABCDS Injury Assessment  Goal: Absence of physical injury  1/11/2024 0346 by Jhoana Avila, RN  Outcome: Progressing     Problem: Nutrition Deficit:  Goal: Optimize nutritional status  1/11/2024 0346 by Jhoana Avila, RN  Outcome: Progressing     
  Problem: Discharge Planning  Goal: Discharge to home or other facility with appropriate resources  1/11/2024 0930 by Darion Sawyer RN  Outcome: Progressing  1/11/2024 0346 by Jhoana Avila RN  Outcome: Progressing  1/10/2024 2339 by Jhoana Avila RN  Outcome: Progressing     Problem: Safety - Adult  Goal: Free from fall injury  1/11/2024 0930 by Darion Sawyer RN  Outcome: Progressing  1/11/2024 0346 by Jhoana Avila RN  Outcome: Progressing  1/10/2024 2339 by Jhoana Avila RN  Outcome: Progressing     Problem: Skin/Tissue Integrity  Goal: Absence of new skin breakdown  Description: 1.  Monitor for areas of redness and/or skin breakdown  2.  Assess vascular access sites hourly  3.  Every 4-6 hours minimum:  Change oxygen saturation probe site  4.  Every 4-6 hours:  If on nasal continuous positive airway pressure, respiratory therapy assess nares and determine need for appliance change or resting period.  1/11/2024 0930 by Darion Sawyer RN  Outcome: Progressing  1/11/2024 0346 by Jhoana Avila RN  Outcome: Progressing  1/10/2024 2339 by Jhoana Avila RN  Outcome: Progressing     Problem: Pain  Goal: Verbalizes/displays adequate comfort level or baseline comfort level  1/11/2024 0930 by Darion Sawyer RN  Outcome: Progressing  1/11/2024 0346 by Jhoana Avila RN  Outcome: Progressing  1/10/2024 2339 by Jhoana Avila RN  Outcome: Progressing     Problem: Chronic Conditions and Co-morbidities  Goal: Patient's chronic conditions and co-morbidity symptoms are monitored and maintained or improved  1/11/2024 0930 by Darion Sawyer RN  Outcome: Progressing  1/11/2024 0346 by Jhoana Avila RN  Outcome: Progressing  1/10/2024 2339 by Jhoana Avila RN  Outcome: Progressing     Problem: ABCDS Injury Assessment  Goal: Absence of physical injury  1/11/2024 0930 by Darion Sawyer RN  Outcome: Progressing  1/11/2024 0346 by 
  Problem: Discharge Planning  Goal: Discharge to home or other facility with appropriate resources  1/11/2024 2052 by Ramila Mohan RN  Outcome: Progressing  1/11/2024 0930 by Darion Sawyer RN  Outcome: Progressing     Problem: Safety - Adult  Goal: Free from fall injury  1/11/2024 2052 by Ramila Mohan RN  Outcome: Progressing  1/11/2024 0930 by Darion Sawyer RN  Outcome: Progressing     Problem: Skin/Tissue Integrity  Goal: Absence of new skin breakdown  Description: 1.  Monitor for areas of redness and/or skin breakdown  2.  Assess vascular access sites hourly  3.  Every 4-6 hours minimum:  Change oxygen saturation probe site  4.  Every 4-6 hours:  If on nasal continuous positive airway pressure, respiratory therapy assess nares and determine need for appliance change or resting period.  1/11/2024 2052 by Ramila Mohan RN  Outcome: Progressing  1/11/2024 0930 by Darion Sawyer RN  Outcome: Progressing     Problem: Pain  Goal: Verbalizes/displays adequate comfort level or baseline comfort level  1/11/2024 2052 by Ramila Mohan RN  Outcome: Progressing  1/11/2024 0930 by Darion Sawyer RN  Outcome: Progressing     Problem: Chronic Conditions and Co-morbidities  Goal: Patient's chronic conditions and co-morbidity symptoms are monitored and maintained or improved  1/11/2024 2052 by Ramila Mohan RN  Outcome: Progressing  1/11/2024 0930 by Darion Sawyer RN  Outcome: Progressing     Problem: ABCDS Injury Assessment  Goal: Absence of physical injury  1/11/2024 2052 by Ramila Mohan RN  Outcome: Progressing  1/11/2024 0930 by Darion Sawyer RN  Outcome: Progressing     Problem: Nutrition Deficit:  Goal: Optimize nutritional status  Outcome: Progressing     
  Problem: Discharge Planning  Goal: Discharge to home or other facility with appropriate resources  1/12/2024 1005 by Darion Sawyer RN  Outcome: Progressing  1/11/2024 2052 by Ramila Mohan RN  Outcome: Progressing     Problem: Safety - Adult  Goal: Free from fall injury  1/12/2024 1005 by Darion Sawyer RN  Outcome: Progressing  1/11/2024 2052 by Ramila Mohan RN  Outcome: Progressing     Problem: Skin/Tissue Integrity  Goal: Absence of new skin breakdown  Description: 1.  Monitor for areas of redness and/or skin breakdown  2.  Assess vascular access sites hourly  3.  Every 4-6 hours minimum:  Change oxygen saturation probe site  4.  Every 4-6 hours:  If on nasal continuous positive airway pressure, respiratory therapy assess nares and determine need for appliance change or resting period.  1/12/2024 1005 by Darion Sawyer RN  Outcome: Progressing  1/11/2024 2052 by Ramila Mohan RN  Outcome: Progressing     Problem: Pain  Goal: Verbalizes/displays adequate comfort level or baseline comfort level  1/12/2024 1005 by Darion Sawyer RN  Outcome: Progressing  1/11/2024 2052 by Ramila Mohan RN  Outcome: Progressing     Problem: Chronic Conditions and Co-morbidities  Goal: Patient's chronic conditions and co-morbidity symptoms are monitored and maintained or improved  1/12/2024 1005 by Darion Sawyer RN  Outcome: Progressing  1/11/2024 2052 by Ramila Mohan RN  Outcome: Progressing     Problem: ABCDS Injury Assessment  Goal: Absence of physical injury  1/11/2024 2052 by Ramila Mohan RN  Outcome: Progressing     Problem: Nutrition Deficit:  Goal: Optimize nutritional status  1/11/2024 2052 by Ramila Mohan RN  Outcome: Progressing     
  Problem: Discharge Planning  Goal: Discharge to home or other facility with appropriate resources  1/12/2024 2133 by Joanna Montelongo RN  Outcome: Progressing  1/12/2024 1005 by Darion Sawyer RN  Outcome: Progressing     Problem: Safety - Adult  Goal: Free from fall injury  1/12/2024 2133 by Joanna Montelongo RN  Outcome: Progressing  1/12/2024 1005 by Darion Sawyer RN  Outcome: Progressing     Problem: Skin/Tissue Integrity  Goal: Absence of new skin breakdown  Description: 1.  Monitor for areas of redness and/or skin breakdown  2.  Assess vascular access sites hourly  3.  Every 4-6 hours minimum:  Change oxygen saturation probe site  4.  Every 4-6 hours:  If on nasal continuous positive airway pressure, respiratory therapy assess nares and determine need for appliance change or resting period.  1/12/2024 2133 by Joanna Montelongo RN  Outcome: Progressing  1/12/2024 1005 by Darion Sawyer RN  Outcome: Progressing     Problem: Pain  Goal: Verbalizes/displays adequate comfort level or baseline comfort level  1/12/2024 2133 by Joanna Montelongo RN  Outcome: Progressing  1/12/2024 1005 by Darion Sawyer RN  Outcome: Progressing     Problem: Chronic Conditions and Co-morbidities  Goal: Patient's chronic conditions and co-morbidity symptoms are monitored and maintained or improved  1/12/2024 2133 by Joanna Montelongo RN  Outcome: Progressing  1/12/2024 1005 by Darion Sawyer RN  Outcome: Progressing     Problem: ABCDS Injury Assessment  Goal: Absence of physical injury  Outcome: Progressing     Problem: Nutrition Deficit:  Goal: Optimize nutritional status  Outcome: Progressing     
  Problem: Discharge Planning  Goal: Discharge to home or other facility with appropriate resources  1/16/2024 1514 by Edna Truong RN  Outcome: Completed  1/16/2024 0435 by Ayanna Dowell RN  Outcome: Progressing     Problem: Safety - Adult  Goal: Free from fall injury  1/16/2024 1514 by Edna Truong RN  Outcome: Completed  1/16/2024 0435 by Ayanna Dowell RN  Outcome: Progressing     Problem: Skin/Tissue Integrity  Goal: Absence of new skin breakdown  Description: 1.  Monitor for areas of redness and/or skin breakdown  2.  Assess vascular access sites hourly  3.  Every 4-6 hours minimum:  Change oxygen saturation probe site  4.  Every 4-6 hours:  If on nasal continuous positive airway pressure, respiratory therapy assess nares and determine need for appliance change or resting period.  1/16/2024 1514 by Edna Truong RN  Outcome: Completed  1/16/2024 0435 by Ayanna Dowell RN  Outcome: Progressing     Problem: Pain  Goal: Verbalizes/displays adequate comfort level or baseline comfort level  1/16/2024 1514 by Edna Truong RN  Outcome: Completed  1/16/2024 0435 by Ayanna Dowell RN  Outcome: Progressing     Problem: Chronic Conditions and Co-morbidities  Goal: Patient's chronic conditions and co-morbidity symptoms are monitored and maintained or improved  1/16/2024 1514 by Edna Truong RN  Outcome: Completed  1/16/2024 0435 by Ayanna Dowell RN  Outcome: Progressing     Problem: ABCDS Injury Assessment  Goal: Absence of physical injury  1/16/2024 1514 by Edna Truong RN  Outcome: Completed  1/16/2024 0435 by Ayanna Dowell RN  Outcome: Progressing     Problem: Nutrition Deficit:  Goal: Optimize nutritional status  1/16/2024 1514 by Edna Truong RN  Outcome: Completed  1/16/2024 0435 by Ayanna Dowell RN  Outcome: Progressing     
  Problem: Discharge Planning  Goal: Discharge to home or other facility with appropriate resources  1/9/2024 1159 by Jamari Dutta RN  Outcome: Progressing     Problem: Safety - Adult  Goal: Free from fall injury  1/9/2024 1159 by Jamari Dutta RN  Outcome: Progressing     Problem: Skin/Tissue Integrity  Goal: Absence of new skin breakdown  Description: 1.  Monitor for areas of redness and/or skin breakdown  2.  Assess vascular access sites hourly  3.  Every 4-6 hours minimum:  Change oxygen saturation probe site  4.  Every 4-6 hours:  If on nasal continuous positive airway pressure, respiratory therapy assess nares and determine need for appliance change or resting period.  1/9/2024 1159 by Jamari Dutta RN  Outcome: Progressing     Problem: Pain  Goal: Verbalizes/displays adequate comfort level or baseline comfort level  1/9/2024 1159 by Jamari Dutta RN  Outcome: Progressing     Problem: Chronic Conditions and Co-morbidities  Goal: Patient's chronic conditions and co-morbidity symptoms are monitored and maintained or improved  1/9/2024 1159 by Jamari Dutta RN  Outcome: Progressing     Problem: ABCDS Injury Assessment  Goal: Absence of physical injury  Outcome: Progressing     
  Problem: Discharge Planning  Goal: Discharge to home or other facility with appropriate resources  1/9/2024 2224 by Jhoana Avila, RN  Outcome: Progressing     Problem: Safety - Adult  Goal: Free from fall injury  1/9/2024 2224 by Jhoana Avila, RN  Outcome: Progressing     Problem: Skin/Tissue Integrity  Goal: Absence of new skin breakdown  Description: 1.  Monitor for areas of redness and/or skin breakdown  2.  Assess vascular access sites hourly  3.  Every 4-6 hours minimum:  Change oxygen saturation probe site  4.  Every 4-6 hours:  If on nasal continuous positive airway pressure, respiratory therapy assess nares and determine need for appliance change or resting period.  1/9/2024 2224 by Jhoana Avila, RN  Outcome: Progressing     Problem: Pain  Goal: Verbalizes/displays adequate comfort level or baseline comfort level  1/9/2024 2224 by Jhoana Avila, RN  Outcome: Progressing     Problem: Chronic Conditions and Co-morbidities  Goal: Patient's chronic conditions and co-morbidity symptoms are monitored and maintained or improved  1/9/2024 2224 by Jhoana Avila, RN  Outcome: Progressing     Problem: ABCDS Injury Assessment  Goal: Absence of physical injury  1/9/2024 2224 by Jhoana Avila, RN  Outcome: Progressing     
  Problem: Discharge Planning  Goal: Discharge to home or other facility with appropriate resources  Outcome: Progressing     Problem: Safety - Adult  Goal: Free from fall injury  1/13/2024 2042 by Joanna Montelongo RN  Outcome: Progressing  1/13/2024 1649 by Celina Durham RN  Outcome: Progressing     Problem: Skin/Tissue Integrity  Goal: Absence of new skin breakdown  Description: 1.  Monitor for areas of redness and/or skin breakdown  2.  Assess vascular access sites hourly  3.  Every 4-6 hours minimum:  Change oxygen saturation probe site  4.  Every 4-6 hours:  If on nasal continuous positive airway pressure, respiratory therapy assess nares and determine need for appliance change or resting period.  1/13/2024 2042 by Joanna Montelongo RN  Outcome: Progressing  1/13/2024 1649 by Celina Durham RN  Outcome: Progressing     Problem: Pain  Goal: Verbalizes/displays adequate comfort level or baseline comfort level  1/13/2024 2042 by Joanna Montelongo RN  Outcome: Progressing  1/13/2024 1649 by Celina Durham RN  Outcome: Progressing     Problem: Chronic Conditions and Co-morbidities  Goal: Patient's chronic conditions and co-morbidity symptoms are monitored and maintained or improved  Outcome: Progressing     Problem: ABCDS Injury Assessment  Goal: Absence of physical injury  Outcome: Progressing     Problem: Nutrition Deficit:  Goal: Optimize nutritional status  Outcome: Progressing     
  Problem: Discharge Planning  Goal: Discharge to home or other facility with appropriate resources  Outcome: Progressing     Problem: Safety - Adult  Goal: Free from fall injury  Outcome: Progressing     Problem: Skin/Tissue Integrity  Goal: Absence of new skin breakdown  Description: 1.  Monitor for areas of redness and/or skin breakdown  2.  Assess vascular access sites hourly  3.  Every 4-6 hours minimum:  Change oxygen saturation probe site  4.  Every 4-6 hours:  If on nasal continuous positive airway pressure, respiratory therapy assess nares and determine need for appliance change or resting period.  Outcome: Progressing     Problem: Pain  Goal: Verbalizes/displays adequate comfort level or baseline comfort level  Outcome: Progressing     Problem: Chronic Conditions and Co-morbidities  Goal: Patient's chronic conditions and co-morbidity symptoms are monitored and maintained or improved  Outcome: Progressing     
  Problem: Discharge Planning  Goal: Discharge to home or other facility with appropriate resources  Outcome: Progressing     Problem: Safety - Adult  Goal: Free from fall injury  Outcome: Progressing     Problem: Skin/Tissue Integrity  Goal: Absence of new skin breakdown  Description: 1.  Monitor for areas of redness and/or skin breakdown  2.  Assess vascular access sites hourly  3.  Every 4-6 hours minimum:  Change oxygen saturation probe site  4.  Every 4-6 hours:  If on nasal continuous positive airway pressure, respiratory therapy assess nares and determine need for appliance change or resting period.  Outcome: Progressing     Problem: Pain  Goal: Verbalizes/displays adequate comfort level or baseline comfort level  Outcome: Progressing     Problem: Chronic Conditions and Co-morbidities  Goal: Patient's chronic conditions and co-morbidity symptoms are monitored and maintained or improved  Outcome: Progressing     Problem: ABCDS Injury Assessment  Goal: Absence of physical injury  Outcome: Progressing     Problem: Nutrition Deficit:  Goal: Optimize nutritional status  Outcome: Progressing     
  Problem: Safety - Adult  Goal: Free from fall injury  Outcome: Progressing     Problem: Skin/Tissue Integrity  Goal: Absence of new skin breakdown  Description: 1.  Monitor for areas of redness and/or skin breakdown    Outcome: Progressing     Problem: Pain  Goal: Verbalizes/displays adequate comfort level or baseline comfort level  Outcome: Progressing     
Deficit:  Goal: Optimize nutritional status  Outcome: Progressing     
or Improved:   Monitor and assess patient's chronic conditions and comorbid symptoms for stability, deterioration, or improvement   Collaborate with multidisciplinary team to address chronic and comorbid conditions and prevent exacerbation or deterioration     Problem: ABCDS Injury Assessment  Goal: Absence of physical injury  1/16/2024 0435 by Ayanna Dowell, RN  Outcome: Progressing  1/15/2024 1859 by Graciela Walker, RN  Outcome: Progressing  Flowsheets (Taken 1/15/2024 0800)  Absence of Physical Injury: Implement safety measures based on patient assessment     Problem: Nutrition Deficit:  Goal: Optimize nutritional status  1/16/2024 0435 by Ayanna Dowell, RN  Outcome: Progressing  1/15/2024 1859 by Graciela Walker, RN  Outcome: Progressing

## 2024-01-17 NOTE — PROGRESS NOTES
PULMONARY & CRITICAL CARE MEDICINE PROGRESS  NOTE     Patient:  Jose Maria Ge  MRN: [id number]  Admit date: 1/9/2024  Primary Care Physician: No primary care provider on file.  Consulting Physician: Debra Burns MD  CODE Status: Full Code  LOS: 5    SUBJECTIVE     I personally interviewed/examined the patient, reviewed interval history and interpreted all available radiographic, laboratory data at the time of service.      Chief Compliant/Reason for Initial Consult:     Acute saddle pulm embolism/DVT/hematuria/pyelonephritis    Brief Hospital Course:  Patient did have history of BPH apparently had history of hematuria in the past and had been followed by urology had recently had a Staley's catheter placed which was removed apparently a week ago.  Patient presented with hematuria urinary retention and lower abdominal pain there was no history of fever and chills.  A CT scan of the abdomen shows large amount of the blood or possible bladder mass patient does have continued hematuria.  Patient did not have syncope or shortness of breath or chest pain a CTA chest was done in outlying facility and found to have a large massive saddle extending into the most of the left main pulmonary artery.  Patient was seen by vascular surgery and this morning was taken for thrombectomy which was done this morning.  Patient is kept on heparin drip although he does have hematuria choices and options are limited and will continue with heparin drip as long as patient does not have drop in hemoglobin or significant hematuria.  Patient need urology as patient is difficulty getting Staley's catheter in [facility] hospital and ED and Staley's catheter was placed by urology.  Venous Dopplers of the leg preliminary report shows common femoral vein and popliteal vein on right side DVT as patient has significant hematuria and if patient is not a candidate for anticoagulation because 
                                                                 PULMONARY & CRITICAL CARE MEDICINE PROGRESS  NOTE     Patient:  Jose Maria Ge  MRN: [id number]  Admit date: 1/9/2024  Primary Care Physician: No primary care provider on file.  Consulting Physician: Everton Hilliard MD  CODE Status: Full Code  LOS: 7    SUBJECTIVE     I personally interviewed/examined the patient, reviewed interval history and interpreted all available radiographic, laboratory data at the time of service.      Chief Compliant/Reason for Initial Consult:     Acute saddle pulm embolism/DVT/hematuria/pyelonephritis    Brief Hospital Course:  Patient did have history of BPH apparently had history of hematuria in the past and had been followed by urology had recently had a Staley's catheter placed which was removed apparently a week ago.  Patient presented with hematuria urinary retention and lower abdominal pain there was no history of fever and chills.  A CT scan of the abdomen shows large amount of the blood or possible bladder mass patient does have continued hematuria.  Patient did not have syncope or shortness of breath or chest pain a CTA chest was done in outlying facility and found to have a large massive saddle extending into the most of the left main pulmonary artery.  Patient was seen by vascular surgery and this morning was taken for thrombectomy which was done this morning.  Patient is kept on heparin drip although he does have hematuria choices and options are limited and will continue with heparin drip as long as patient does not have drop in hemoglobin or significant hematuria.  Patient need urology as patient is difficulty getting Staley's catheter in [facility] hospital and ED and Staley's catheter was placed by urology.  Venous Dopplers of the leg preliminary report shows common femoral vein and popliteal vein on right side DVT as patient has significant hematuria and if patient is not a candidate for anticoagulation because of 
          [facility] -   Infectious diseases evaluation  admission date 1/9/2024    reason for consultation:   GNR sepsis    Impression :   Current:  Acute large saddle P emboli   1/9 thrombectomy  Very large prostate impeding on the bladder  Urine retention and bilat hydronephrosis  Bilat obstructive Pyelonephritis   U cx kleb and enterococcus fecalis  CTAP no renal absxcss  Kleb septicemia x 2 from the pyelonephritis  Persistent fever from the pyelonephritis  - resolved  R DVT w edema R leg  CHRIS   Mild bandemia  CRP elevation 200 - 127    Other:    Discussion / summary of stay / plan of care     Recommendations   1/9 Meropenem stopped 1/10  Kleb  not ESBL -  Post ceftriaxone  Needs 3 weeks AB  CRP responding, fever resolved 1/13  Reconsiled for DC, levaquin po till 1/28      Infection Control Recommendations   [place] Precautions  Antimicrobial Stewardship Recommendations     Targeted therapy  History of Present Illness:   Initial history:  Jose Maria Ge is a 68 y.o.-year-old male presented w SOB and abd pain  HTN and noted w A KI - urine retention and CTAP w large prostate impeding on the bladder -w bilat hydronephrosis  BC + kleb x 2  fever  ID called   nausea, vomiting, diarrhea all resolved after admission  Has a keys w bloody urine  R groin access of the thrombectomy is sore but looks good  Abd soft - ox 3 and on 3 L NC    Also has a saddle PE large in size and post thrombectomy 1/9/23 Dr Leyva        Interval changes  1/16/2024   Patient Vitals for the past 8 hrs:   Pulse   01/16/24 1600 76     1/10/24  LGF and in general better - off the pressors  WBC 17 - no steroids  BC + kleb sensi     1/11  101F   BC 1/9 kleb S all  U cx kleb and EF both sensi to levaquin  CTAP 1/11 done for drop in hct, no retroperitoneal hematoma and improved bilat hydronephrosis    Fever might just be delayed from pyelonephritis     1/12  Ox 3 and 3 L NC -on heparin   ( from 
          [facility] -   Infectious diseases evaluation  admission date 1/9/2024    reason for consultation:   GNR sepsis    Impression :   Current:  Acute large saddle P emboli   1/9 thrombectomy  Very large prostate impeding on the bladder  Urine retention and bilat hydronephrosis  Bilat obstructive Pyelonephritis   U cx kleb and enterococcus fecalis  Kleb septicemia x 2 from the pyelonephritis  Persistent fever from the pyelonephritis   R DVT w edema R leg  CHRIS   Mild bandemia  CRP elevation 200 - 127    Other:    Discussion / summary of stay / plan of care   CTAP no renal absxess  Fever might just be delayed from pyelonephritis   Recommendations   1/9 Meropenem stopped 1/10  Kleb  not ESBL -1/10 switch to ceftriaxone 2 g daily  Added levaquin 1/11 due to persistent fever   3 weeks iv AB for the sepsis and pyelo, till 1/28  Course extended to 3 weeks due to the concern of kid involvement   CRP responding      Infection Control Recommendations   [place] Precautions  Antimicrobial Stewardship Recommendations     Targeted therapy  History of Present Illness:   Initial history:  Jose Maria Ge is a 68 y.o.-year-old male presented w SOB and abd pain  HTN and noted w A KI - urine retention and CTAP w large prostate impeding on the bladder -w bilat hydronephrosis  BC + kleb x 2  fever  ID called   nausea, vomiting, diarrhea all resolved after admission  Has a keys w bloody urine  R groin access of the thrombectomy is sore but looks good  Abd soft - ox 3 and on 3 L NC    Also has a saddle PE large in size and post thrombectomy 1/9/23 Dr Leyva        Interval changes  1/12/2024   Patient Vitals for the past 8 hrs:   BP Temp Temp src Pulse Resp SpO2   01/12/24 1938 (!) 144/71 99.3 °F (37.4 °C) Oral 69 26 93 %   01/12/24 1600 135/73 -- -- 67 22 100 %   01/12/24 1500 -- -- -- 67 18 98 %   01/12/24 1400 134/66 -- -- 67 20 96 %   01/12/24 1300 128/64 -- -- 67 18 96 %     1/10/24  LGF 
          [facility] -   Infectious diseases evaluation  admission date 1/9/2024    reason for consultation:   GNR sepsis    Impression :   Current:  Acute large saddle P emboli   1/9 thrombectomy  Very large prostate impeding on the bladder  Urine retention and bilat hydronephrosis  Bilat obstructive Pyelonephritis   U cx kleb and enterococcus fecalis  Kleb septicemia x 2  R DVT w edema R leg  CHRIS   Mild bandemia    Other:    Discussion / summary of stay / plan of care     Recommendations   1/9 Meropenem and adjust to final kleb sensi of the blood  - not ESBL -1/10 switch to ceftriaxone 2 g daily  Will plan 3 weeks iv AB for the sepsis and pyelo, till 1/28  Course extended to 3 weeks due to the concern of kid involvement     Infection Control Recommendations   [place] Precautions      Antimicrobial Stewardship Recommendations     Targeted therapy        History of Present Illness:   Initial history:  Jose Maria Ge is a 68 y.o.-year-old male presented w SOB and abd pain  HTN and noted w A KI - urine retention and CTAP w large prostate impeding on the bladder -w bilat hydronephrosis  BC + kleb x 2  fever  ID called   nausea, vomiting, diarrhea all resolved after admission  Has a keys w bloody urine  R groin access of the thrombectomy is sore but looks good  Abd soft - ox 3 and on 3 L NC    Also has a saddle PE large in size and post thrombectomy 1/9/23 Dr Leyva        Interval changes  1/10/2024   Patient Vitals for the past 8 hrs:   BP Temp Temp src Pulse Resp SpO2 Height   01/10/24 1531 -- -- -- -- -- -- 1.92 m (6' 3.59\")   01/10/24 1529 -- 100.3 °F (37.9 °C) Oral -- -- -- --   01/10/24 1400 128/62 -- -- 84 23 100 % --   01/10/24 1300 (!) 120/55 -- -- 87 24 97 % --   01/10/24 1200 (!) 113/59 -- -- 88 26 100 % --   01/10/24 1150 -- 98.2 °F (36.8 °C) Oral 88 17 100 % --   01/10/24 1100 (!) 105/54 -- -- 88 23 100 % --     1/10/24  LGF and in general better - off the 
          [facility] -   Infectious diseases evaluation  admission date 1/9/2024    reason for consultation:   GNR sepsis    Impression :   Current:  Acute large saddle P emboli   1/9 thrombectomy  Very large prostate impeding on the bladder  Urine retention and bilat hydronephrosis  Bilat obstructive Pyelonephritis   U cx kleb and enterococcus fecalis  CTAP no renal absxcss  Kleb septicemia x 2 from the pyelonephritis  Persistent fever from the pyelonephritis  - resolved  R DVT w edema R leg  CHRIS   Mild bandemia  CRP elevation 200 - 127    Other:    Discussion / summary of stay / plan of care     Recommendations   1/9 Meropenem stopped 1/10  Kleb  not ESBL -  1/10 switch to ceftriaxone 2 g daily  Added levaquin 1/11 due to persistent fever   Fever resolved   1/15 stop ceftriaxone and keep levaquin  Plan AB  3 weeks iv AB for the sepsis and pyelo, till 1/28  (3 weeks due to the concern of kid involvement )  CRP responding, fever resolved 1/13  Reconsiled for DC, levaquin po till 1/28      Infection Control Recommendations   [place] Precautions  Antimicrobial Stewardship Recommendations     Targeted therapy  History of Present Illness:   Initial history:  Jose Maria Ge is a 68 y.o.-year-old male presented w SOB and abd pain  HTN and noted w A KI - urine retention and CTAP w large prostate impeding on the bladder -w bilat hydronephrosis  BC + kleb x 2  fever  ID called   nausea, vomiting, diarrhea all resolved after admission  Has a keys w bloody urine  R groin access of the thrombectomy is sore but looks good  Abd soft - ox 3 and on 3 L NC    Also has a saddle PE large in size and post thrombectomy 1/9/23 Dr Leyva        Interval changes  1/15/2024   Patient Vitals for the past 8 hrs:   BP Temp Temp src Pulse Resp SpO2   01/15/24 2058 (!) 144/74 98.6 °F (37 °C) Oral 68 20 98 %   01/15/24 1817 124/83 -- -- 66 -- --   01/15/24 1645 134/67 98 °F (36.7 °C) Oral 69 21 98 % 
          Pharmacy Note     Renal Dose Adjustment    Jose Maria Ge is a 68 y.o. male. Pharmacist assessment of renally cleared medications.    Recent Labs     01/08/24  2030 01/09/24  0027   BUN 37* 39*       Recent Labs     01/08/24 2030 01/09/24  0027   CREATININE 4.1* 4.4*       Estimated Creatinine Clearance: 23 mL/min (A) (based on SCr of 4.4 mg/dL (H)).    Height:   Ht Readings from Last 1 Encounters:   01/09/24 1.92 m (6' 3.59\")     Weight:  Wt Readings from Last 1 Encounters:   01/09/24 133.1 kg (293 lb 6.9 oz)       The following medication dose has been adjusted based upon renal function per P&T Guidelines:             Meropenem 1000mg IV Q8H to Q12H    César MonteroD [facility]  1/9/2024 2:43 PM    
    Trey Rizvi, Haroon, Zeny, Nani, Mario, Herve Jr  Urology Progress Note     Subjective:   Has not required any hand irrigation overnight  Urine is clear pink on moderate gtt CBI  AFVSS        Patient Vitals for the past 24 hrs:   BP Temp Temp src Pulse Resp SpO2 Weight   01/12/24 0714 -- -- -- 73 17 99 % --   01/12/24 0617 -- -- -- -- -- -- (!) 139.8 kg (308 lb 3.3 oz)   01/12/24 0100 (!) 149/72 -- -- 76 20 100 % --   01/12/24 0000 (!) 144/81 99.3 °F (37.4 °C) Oral 70 19 100 % --   01/11/24 1800 -- -- -- 73 25 99 % --   01/11/24 1700 (!) 147/75 -- -- 71 23 100 % --   01/11/24 1629 -- -- -- 73 20 100 % --   01/11/24 1600 135/86 98.2 °F (36.8 °C) Axillary 71 24 99 % --   01/11/24 1400 139/65 -- -- 71 17 100 % --   01/11/24 1300 128/66 -- -- 71 18 100 % --   01/11/24 1200 123/75 97.8 °F (36.6 °C) Axillary 69 26 100 % --   01/11/24 1100 121/67 -- -- 68 17 100 % --   01/11/24 1021 -- -- -- -- -- (!) 88 % --   01/11/24 1000 114/67 -- -- 70 22 92 % --   01/11/24 0935 -- -- -- -- -- 100 % (!) 137.8 kg (303 lb 12.7 oz)   01/11/24 0900 135/73 -- -- 65 16 100 % --   01/11/24 0800 123/64 98.3 °F (36.8 °C) Oral 68 -- 100 % --         Intake/Output Summary (Last 24 hours) at 1/12/2024 0717  Last data filed at 1/12/2024 0545  Gross per 24 hour   Intake 2969.78 ml   Output 4300 ml   Net -1330.22 ml         Recent Labs     01/10/24  0824 01/10/24  2337 01/11/24  0937 01/11/24  1140 01/11/24  1753 01/12/24  0038   WBC 17.7*  --  10.5  --   --   --    HGB 7.6*   < > 7.3* 7.6* 7.4* 7.7*   HCT 26.3*   < > 23.6* 24.5* 24.0* 25.5*   MCV 93.3  --  88.4  --   --   --      --  183  --   --   --     < > = values in this interval not displayed.       Recent Labs     01/10/24  0824 01/10/24  1328 01/11/24  0937    133* 136   K 5.0 4.5 4.3    100 103   CO2 24 23 22   BUN 44* 44* 36*   CREATININE 3.0* 2.8* 1.9*         No results for input(s): \"COLORU\", \"PHUR\", \"LABCAST\", \"WBCUA\", \"RBCUA\", \"MUCUS\", \"TRICHOMONAS\", 
    Trey Rizvi, Zeny Patiño Mostafa, Buck, Murtagh Jr  Urology Progress Note     Subjective:   Staley draining much better today following intervention yesterday morning  Has not required any hand irrigation overnight  Urine is strawberry lemonade on very slow drip CBI-increase slightly this morning  Briefly, nurse did state that his urine became ezekiel-colored overnight for some time before returning to strawberry lemonade color  Otherwise, patient did have a fever overnight Tmax 101.5 °F, currently afebrile this morning  Remaining vitals stable    Patient Vitals for the past 24 hrs:   BP Temp Temp src Pulse Resp SpO2 Height Weight   01/11/24 0700 122/64 -- -- 68 16 100 % -- --   01/11/24 0630 119/64 98.5 °F (36.9 °C) Axillary 69 18 100 % -- --   01/11/24 0615 111/62 98.5 °F (36.9 °C) Axillary 73 20 100 % -- --   01/11/24 0600 112/64 98.6 °F (37 °C) -- 73 18 100 % -- --   01/11/24 0550 110/63 98.5 °F (36.9 °C) -- 71 17 100 % -- --   01/11/24 0500 (!) 103/59 -- -- 81 25 99 % -- --   01/11/24 0400 123/60 99.9 °F (37.7 °C) Oral 84 22 100 % -- --   01/11/24 0300 106/61 (!) 101.5 °F (38.6 °C) -- 87 19 100 % -- --   01/11/24 0200 (!) 103/55 -- -- 96 19 100 % -- --   01/11/24 0155 102/62 100.4 °F (38 °C) Oral 96 14 100 % -- --   01/11/24 0145 (!) 99/59 100.4 °F (38 °C) Oral 96 24 100 % -- --   01/11/24 0132 98/60 (!) 100.8 °F (38.2 °C) -- 97 22 100 % -- --   01/11/24 0100 (!) 89/57 -- -- 95 21 100 % -- --   01/11/24 0000 (!) 121/55 100.4 °F (38 °C) Oral 82 22 100 % -- --   01/10/24 2330 (!) 116/56 -- -- 82 19 100 % -- --   01/10/24 2300 (!) 106/54 -- -- 82 18 100 % -- --   01/10/24 2230 (!) 115/96 -- -- -- -- -- -- --   01/10/24 2200 (!) 98/52 -- -- 85 28 100 % -- --   01/10/24 2130 (!) 115/53 -- -- -- -- -- -- --   01/10/24 2100 (!) 112/57 -- -- 79 24 100 % -- --   01/10/24 2030 (!) 111/51 -- -- -- -- -- -- --   01/10/24 2000 (!) 93/48 98.9 °F (37.2 °C) Oral 80 27 100 % -- --   01/10/24 1900 (!) 97/49 -- -- 82 21 
    Trey Rizvi, Zeny Patiño Mostafa, Herve Martin Jr  Urology Progress Note     Subjective:   Has not required any hand irrigation overnight  Urine is clear on slow drip CBI   AFVSS  Hgb 7.7 (8.3)   Cr 1.0 (1.2)     Patient Vitals for the past 24 hrs:   BP Temp Temp src Pulse Resp SpO2   01/14/24 0745 (!) 147/81 98.8 °F (37.1 °C) Oral 64 20 100 %   01/14/24 0340 (!) 152/78 98.8 °F (37.1 °C) Oral 65 21 99 %   01/13/24 2326 133/69 99.2 °F (37.3 °C) Oral 66 20 100 %   01/13/24 1935 130/69 98.1 °F (36.7 °C) Oral 70 24 98 %   01/13/24 1605 (!) 160/88 97.5 °F (36.4 °C) Oral 60 20 98 %         Intake/Output Summary (Last 24 hours) at 1/14/2024 0907  Last data filed at 1/14/2024 0615  Gross per 24 hour   Intake --   Output 3700 ml   Net -3700 ml         Recent Labs     01/12/24  0842 01/12/24  1614 01/13/24  0339 01/13/24  1015 01/13/24  1641 01/13/24  2054 01/14/24  0310   WBC 7.8  --  9.1  --   --   --  10.5   HGB 7.5*   < > 7.9*   < > 6.7* 8.3* 7.7*   HCT 24.0*   < > 25.4*   < > 20.0* 26.0* 25.1*   MCV 87.9  --  88.8  --   --   --  90.0     --  228  --   --   --  241    < > = values in this interval not displayed.       Recent Labs     01/12/24  0842 01/13/24  0339 01/14/24  0310   * 135 135   K 4.2 4.7 4.2    102 102   CO2 21 23 23   BUN 25* 20 15   CREATININE 1.4* 1.2 1.0         No results for input(s): \"COLORU\", \"PHUR\", \"LABCAST\", \"WBCUA\", \"RBCUA\", \"MUCUS\", \"TRICHOMONAS\", \"YEAST\", \"BACTERIA\", \"CLARITYU\", \"SPECGRAV\", \"LEUKOCYTESUR\", \"UROBILINOGEN\", \"BILIRUBINUR\", \"BLOODU\" in the last 72 hours.    Invalid input(s): \"NITRATE\", \"GLUCOSEUKETONESUAMORPHOUS\"      Additional Lab/culture results: Urine culture growing both Klebsiella pneumonia greater than 100 K resistant to ampicillin, and Enterococcus faecalis greater than 100 K resistant to tetracycline, otherwise both are relatively pansensitive, blood cultures growing Klebsiella pneumonia    Physical Exam:   General: NAD  HEENT: normocephalic, 
    Trey Rizvi, Zeny Patiño Mostafa, Herve Martin Jr  Urology Progress Note     Subjective:   Has not required any hand irrigation overnight  Urine is clear on slow drip CBI   AFVSS  Hgb 7.9 (7.8)   Cr 1.2 (1.4)     Patient Vitals for the past 24 hrs:   BP Temp Temp src Pulse Resp SpO2 Weight   01/13/24 0745 (!) 149/75 98.9 °F (37.2 °C) Oral 64 21 99 % --   01/13/24 0620 -- -- -- -- -- -- (!) 140.2 kg (309 lb 1.4 oz)   01/13/24 0340 (!) 154/75 99.1 °F (37.3 °C) Oral 68 20 100 % --   01/12/24 2340 (!) 153/69 100.2 °F (37.9 °C) Oral 76 22 98 % --   01/12/24 2000 -- -- -- 69 -- -- --   01/12/24 1938 (!) 144/71 99.3 °F (37.4 °C) Oral 69 26 93 % --   01/12/24 1600 135/73 -- -- 67 22 100 % --   01/12/24 1500 -- -- -- 67 18 98 % --   01/12/24 1400 134/66 -- -- 67 20 96 % --   01/12/24 1300 128/64 -- -- 67 18 96 % --   01/12/24 1200 124/62 97.8 °F (36.6 °C) Oral 71 23 96 % --   01/12/24 1100 (!) 119/58 -- -- 74 12 99 % --   01/12/24 1000 132/60 -- -- 69 20 100 % --   01/12/24 0900 (!) 125/59 -- -- 76 22 99 % --         Intake/Output Summary (Last 24 hours) at 1/13/2024 0832  Last data filed at 1/13/2024 0801  Gross per 24 hour   Intake 1530.78 ml   Output 500 ml   Net 1030.78 ml         Recent Labs     01/11/24  0937 01/11/24  1140 01/12/24  0842 01/12/24  1614 01/12/24 2038 01/13/24  0339   WBC 10.5  --  7.8  --   --  9.1   HGB 7.3*   < > 7.5* 7.6* 7.8* 7.9*   HCT 23.6*   < > 24.0* 23.9* 25.8* 25.4*   MCV 88.4  --  87.9  --   --  88.8     --  200  --   --  228    < > = values in this interval not displayed.       Recent Labs     01/11/24  0937 01/12/24  0842 01/13/24 0339    133* 135   K 4.3 4.2 4.7    103 102   CO2 22 21 23   BUN 36* 25* 20   CREATININE 1.9* 1.4* 1.2         No results for input(s): \"COLORU\", \"PHUR\", \"LABCAST\", \"WBCUA\", \"RBCUA\", \"MUCUS\", \"TRICHOMONAS\", \"YEAST\", \"BACTERIA\", \"CLARITYU\", \"SPECGRAV\", \"LEUKOCYTESUR\", \"UROBILINOGEN\", \"BILIRUBINUR\", \"BLOODU\" in the last 72 
1430 bloody drainage noted from urethra surround catheter. Urine was previously clear yellow, now more red with tiny clots. Perfectserve sent to urology resident MD Florinda. Response- restart CBI, goal is slightly pink.  1620- urine returned to clear yellow, perfectserve sent to resident. Response to maintain CBI at slow drip.   
Advance Care Planning     Advance Care Planning Inpatient Note  [facility] Department    Today's Date: 1/12/2024  Unit: [facility] 3- MICU    Received request from Other: Case Management .  Upon review of chart and communication with care team, patient's decision making abilities are not in question.. Patient and sister were present in the room during visit.    Goals of ACP Conversation:  Discuss advance care planning documents  Facilitate a discussion related to patient's goals of care as they align with the patient's values and beliefs.    Health Care Decision Makers:     No healthcare decision makers have been documented.  Click here to complete HealthCare Decision Makers including selection of the Healthcare Decision Maker Relationship (ie \"Primary\")  Summary:  Completed New Documents  Primary:  Matt Ge (brother)  [phone]    Secondary:  Nirmala Sun (sister)  [phone]    Alternate:  Jes Ge (sister-in-law)  [phone]    Advance Care Planning Documents (Patient Wishes):  Healthcare Power of /Advance Directive Appointment of Health Care Agent     Assessment:   received referral from Case Management. Upon entering the room, pt was in bed and sister was sitting on the couch. Sister shared that they wished to have power of  documents filled out.  confirmed that this was pt's wishes and also assessed that pt was able to complete the documents at this time.  walked through document with family and answered questions regarding document.          Interventions:  Provided education on documents for clarity and greater understanding  Assisted in the completion of documents according to patient's wishes at this time    Care Preferences Communicated:   No    Outcomes/Plan:  New advance directive completed.  Returned original document(s) to patient, as well as copies for distribution to appointed agents  Copy of advance directive given to staff to scan into 
Critical Care Team - Daily Progress Note      Date and time: 1/11/2024 7:11 AM  Patient's name:  Jose Maria Ge  Medical Record Number: [id number]  Patient's account/billing number: [id number]  Patient's YOB: 1955  Age: 68 y.o.  Date of Admission: 1/9/2024 12:09 AM  Length of stay during current admission: 2      Primary Care Physician: No primary care provider on file.  ICU Attending Physician:      Code Status: Full Code    Reason for ICU admission:   Chief Complaint   Patient presents with    Tachycardia    Chest Pain     Staley catheter problem         SUBJECTIVE:     OVERNIGHT EVENTS:           Overnight H&H dropped to 6.4, 2 unit of PRBCs given.  Hemoglobin 7.6 Patient afebrile overnight.  Alert oriented x 3, denies any complaints today.  Receiving bladder irrigation.  Hematuria has been clearing up.  ID recommending to keep meropenem.  Plan for 2 weeks of IV antibiotics.  LR @ 100ccs an hour.  2 L NC    Labs this morning not yet resulted. Troponin decreasing.  Venous Dopplers showed Right: Common femoral, popliteal and TP trunk vein DVT.         History of present illness:  Jose Maria Ge is a 68 y.o. male presented as a transfer from [place] emergency department.  Past medical history significant for BPH, hypertension as well as acute renal failure.  Patient was originally seen at [facility] emergency department after presenting acute urinary retention.  Patient was having lower abdominal pain.  Was found to have almost a liter in his bladder.  They were unable to place a Staley there and so he was transferred here for urology consultation.  Patient has been admitted to [facility] for something similar that resulted in acute renal failure.  Patient while at [place] emergency department went into SVT.  This was not worked up there.  In the emergency department here patient having a D-dimer greater than 20,000.  CT PE study showing a saddle pulmonary embolism with no evidence of right 
Critical Care Team - Daily Progress Note      Date and time: 1/12/2024 7:28 AM  Patient's name:  Jose Maria Ge  Medical Record Number: [id number]  Patient's account/billing number: [id number]  Patient's YOB: 1955  Age: 68 y.o.  Date of Admission: 1/9/2024 12:09 AM  Length of stay during current admission: 3      Primary Care Physician: No primary care provider on file.  ICU Attending Physician:      Code Status: Full Code    Reason for ICU admission:   Chief Complaint   Patient presents with    Tachycardia    Chest Pain     Staley catheter problem         SUBJECTIVE:     OVERNIGHT EVENTS:         No acute events overnight.  Patient tolerating p.o. intake.  Still having some bloody clear output from his continuous bladder irrigation catheter.  Hemoglobin is stable though while on heparin GTT.  Creatinine improving.  Patient medically stable appropriate for stepdown.    Venous Dopplers showed Right: Common femoral, popliteal and TP trunk vein DVT.         History of present illness:  Jose Maria Ge is a 68 y.o. male presented as a transfer from [place] emergency department.  Past medical history significant for BPH, hypertension as well as acute renal failure.  Patient was originally seen at [place] emergency department after presenting acute urinary retention.  Patient was having lower abdominal pain.  Was found to have almost a liter in his bladder.  They were unable to place a Staley there and so he was transferred here for urology consultation.  Patient has been admitted to [facility] for something similar that resulted in acute renal failure.  Patient while at [place] emergency department went into SVT.  This was not worked up there.  In the emergency department here patient having a D-dimer greater than 20,000.  CT PE study showing a saddle pulmonary embolism with no evidence of right heart strain.  CT abdomen pelvis showing what could be a mass in the bladder versus hemorrhage.  
IV's removed. Patient belongings bagged and labeled to send with patient. Patient leaving with 3 way keys still in place and verbalizes understanding for foll\ow up care. Transportation being provided to transfer back to [place].   
Occupational Therapy    [facility]  Occupational Therapy Not Seen Note    DATE: 2024    NAME: Jose Maria Ge  MRN: [id number]   : 1955      Patient not seen this date for Occupational Therapy due to:    Strict Bedrest: Order remains in place. Will await until SBR order is removed prior to OT evaluation per protocol.         Electronically signed by Celina Diaz OT on 2024 at 2:52 PM    
Occupational Therapy    [facility]  Occupational Therapy Not Seen Note    DATE: 2024    NAME: Jose Maria Ge  MRN: [id number]   : 1955      Patient not seen this date for Occupational Therapy due to:    Strict Bedrest:    Next Scheduled Treatment: 2024 as able    Electronically signed by CAROLINA LYNN on 2024 at 4:41 PM   
Occupational Therapy    [facility]  Occupational Therapy Not Seen Note    DATE: 2024    NAME: Jose Maria Ge  MRN: [id number]   : 1955      Patient not seen this date for Occupational Therapy due to:    Other: Writer discussing with RN, who reports pt w/ double keys and increase pain/ bleeding, optimal position for the patient at this time is supine per RN for increase quality of life/care.     Next Scheduled Treatment: Please check back 23 for OT evaluation.     Electronically signed by Jenny Mchugh OT on 2024 at 8:39 AM    
Occupational Therapy  Facility/Department: [facility] CAR 2- STEPDOWN  Occupational Therapy Initial Assessment    Name: Jose Maria Ge  : 1955  MRN: [id number]  Date of Service: 2024    Discharge Recommendations:  Patient would benefit from continued therapy after discharge  OT Equipment Recommendations  Other: Continue to assess as pt's mobility progresses       Patient Diagnosis(es): The primary encounter diagnosis was Acute saddle pulmonary embolism with acute cor pulmonale (HCC). Diagnoses of Paroxysmal supraventricular tachycardia and Urinary retention were also pertinent to this visit.  Past Medical History:  has no past medical history on file.  Past Surgical History:  has a past surgical history that includes vascular surgery (Right, 2024).       Chief Complaint   Patient presents with    Tachycardia    Chest Pain     Staley catheter problem         Assessment   Performance deficits / Impairments: Decreased functional mobility ;Decreased ADL status;Decreased endurance;Decreased high-level IADLs;Decreased balance  Assessment: Pt agreeable to OT evaluation. Completed mobility supine to sit EOB with CGA. Tolerated ~15 minutes of sitting EOB. Pt became increasingly nauseous, dizzy and diaphoretic with little to no relief utilizing dizziness management strategy and using cool towel to cool pt down. RN notified and broguht pt nausea medication. D/t pt not progressing, returned supine with min A for BLE. Pt provided bucket d/t experiencing emesis. RN notified and present to assess pt. Pt is expected to require skilled OT services during their acute hospitalization stay to address the above noted deficits through skilled occupational therapy intervention for promotion of increased independence throughout ADLs, IADLs and functional mobility tasks.   Prognosis: Good  Decision Making: Medium Complexity  REQUIRES OT FOLLOW-UP: Yes  Activity Tolerance  Activity Tolerance: Treatment limited secondary to medical 
Occupational Therapy  Facility/Department: [facility] CAR 2- STEPDOWN  Occupational Therapy Daily Treatment Note      Name: Jose Maria Ge  : 1955  MRN: [id number]  Date of Service: 2024    Discharge Recommendations:  Patient would benefit from continued therapy after discharge       Patient Diagnosis(es): The primary encounter diagnosis was Acute saddle pulmonary embolism with acute cor pulmonale (HCC). Diagnoses of Paroxysmal supraventricular tachycardia and Urinary retention were also pertinent to this visit.  Past Medical History:  has no past medical history on file.  Past Surgical History:  has a past surgical history that includes vascular surgery (Right, 2024).       Assessment   Performance deficits / Impairments: Decreased functional mobility ;Decreased ADL status;Decreased endurance;Decreased high-level IADLs;Decreased balance  Assessment: Pt would benefit from continued acute care and post acute care OT to address above listed deficits.  Prognosis: Good  Activity Tolerance  Activity Tolerance: Patient Tolerated treatment well;Patient limited by fatigue  Activity Tolerance Comments: Limited d/t nausea and dizziness        Plan   Occupational Therapy Plan  Times Per Week: 3-4x/wk  Current Treatment Recommendations: Balance training, Endurance training, Functional mobility training, Safety education & training, Home management training, Self-Care / ADL, Patient/Caregiver education & training, Equipment evaluation, education, & procurement     Restrictions  Restrictions/Precautions  Restrictions/Precautions: Fall Risk, General Precautions, Up as Tolerated  Required Braces or Orthoses?: No  Position Activity Restriction  Other position/activity restrictions: s/p: thrombectomy on 2023, continuous bladder irrigation, keys    Subjective   General  Patient assessed for rehabilitation services?: Yes  Family / Caregiver Present: No  General Comment  Comments: RN ok'd for OT tx. Pt agreeable to session, 
[facility]  Office: [phone]  Lorenzo Sosa DO, Tanner Angulo DO, Quintin Donnelly DO, Mikael Tomas DO, Kale Garcia MD, Jennie Guerrier MD, Marie Abdi MD, Radha Shah MD,  Bernard Corrales MD, Ulices Moura MD, Debra Burns MD,  Papa Hogue DO, Abbi Stevens MD, Darion Dumont MD, Todd Sosa DO, Bre Carlos MD,  William Billingsley DO, Alessandra Bernardo MD, Jamia Bay MD, Cyndi Olivia MD, Dom Ruiz MD,  Wilbert Velasquez MD, Mariama Richards MD, Annita Yao MD, Michelle Ortiz MD, Aguilar Vigil MD, Bishop Mcgee MD, Luis Cook DO, Getachew Oseguera DO, Luna Steward MD,  Jeremiah Charlton MD, Shirley Waterhouse, CNP,  Twyla Elena CNP, Tico Taylor, CNP,  Sara Marinelli, STEVEN, Suzette Liz, CNP, Bonnie Bruno, CNP, Marjorie Samaniego CNP, Izabela Ayala CNP, Meghann Olivia, CNP, Viviane Mcpherson, PA-C, Kanwal Nichols PA-C, Mitzi Quintana, CNP, Amina Meyers, CNS, Jacquie Erickson, CNP, Arlette Castellano CNP, Tracy Schwab, CNP         [facility]   IN-PATIENT SERVICE   [facility]    Progress Note    1/15/2024    1:59 PM    Name:   Jose Maria Ge  MRN:     [id number]     Acct:      [id number]   Room:   2022/2022-01  IP Day:  6  Admit Date:  1/9/2024 12:09 AM    PCP:   No primary care provider on file.  Code Status:  Full Code    Subjective:     C/C:   Chief Complaint   Patient presents with    Tachycardia    Chest Pain     Staley catheter problem     Interval History Status: not changed.     Patient seen and examined. Feels ok, drinking ensures.     Brief History:     68-year-old male past medical history of obstructive uropathy, acute renal failure, hydronephrosis, hypertension, gout, BPH, presented to [facility] with acute urinary retention abdominal pain unable to pass Staley and transferred to [facility] for urology evaluation.  Patient went into SVT found to have elevated D-dimer and CT PE showing saddle pulmonary embolism with no 
Patient transferred to room 2022 with belongings. Patient's family member (Jes) called and notified of patient's new room.   
Phase 1 completed. See ICU documentation for continued care.  
Physical Therapy        Physical Therapy Cancel Note      DATE: 2024    NAME: Jose Maria Ge  MRN: [id number]   : 1955      Patient not seen this date for Physical Therapy due to:    Patient is not appropriate for PT evaluation/treatment at this time d/t on bladder irrigation with increased bleeding.   RN prefers pt remain supine this date until stabilizes      Electronically signed by Anu Jerome PT on 2024 at 1:40 PM      
Physical Therapy  Facility/Department: [facility] CAR 2- STEPDOWN  Physical Therapy Daily Treatment Note    Name: Jose Maria eG  : 1955  MRN: [id number]  Date of Service: 2024    Discharge Recommendations:  Patient would benefit from continued therapy after discharge   PT Equipment Recommendations  Equipment Needed: No      Patient Diagnosis(es): The primary encounter diagnosis was Acute saddle pulmonary embolism with acute cor pulmonale (HCC). Diagnoses of Paroxysmal supraventricular tachycardia and Urinary retention were also pertinent to this visit.  Past Medical History:  has no past medical history on file.  Past Surgical History:  has a past surgical history that includes vascular surgery (Right, 2024).    Assessment   Body Structures, Functions, Activity Limitations Requiring Skilled Therapeutic Intervention: Decreased functional mobility ;Decreased strength;Decreased endurance;Decreased balance  Assessment: Pt Rani in bed mobility, Rani in STS w/ RW. Pt unable maintain standing or attempt ambulation d/t dizziness. Pt advised to attempted sitting near vertical w/ trunk to acclimate to gravity and reduce dizziness. Pt is currently unsafe to return to prior living arraingements. Pt would benefit from continued acute PT to address deficits.  Therapy Prognosis: Good  Decision Making: Medium Complexity  Requires PT Follow-Up: Yes  Activity Tolerance  Activity Tolerance: Patient limited by fatigue;Patient limited by endurance;Other (comment)  Activity Tolerance Comments: Session limited by dizziness that would not dissipate w/ prolonged seated rest break and ankle pumps. Pt returned supine, RN aware.     Plan   Physical Therapy Plan  General Plan:  (5-6x/week)  Current Treatment Recommendations: Strengthening, Balance training, Functional mobility training, Transfer training, Gait training, Safety education & training, Home exercise program, Therapeutic activities, Patient/Caregiver education & training, 
Renal Progress Note    Patient :  Jose Maria Ge; 68 y.o. MRN# [id number]  Location:  3010/3010-01  Attending:  Marcus Thakkar MD  Admit Date:  1/9/2024   Hospital Day: 2    Subjective:     Continues to have hematuria, bladder irrigation continues.  Heparin continues for left-sided pulmonary embolism, he is status post mechanical thrombectomy.  Hemoglobin had dropped down to 6.7 required 2 units of packed cells.  Oral intake suboptimal.  Urine output continues to be good.  Creatinine is improving down to 2.8 now.  Denies any shortness of breath orthopnea.  Intravenous Rocephin continues for Klebsiella bacteremia and Klebsiella and Enterococcus urosepsis.  Did have fever up to 101 °F overnight.  Not requiring pressors but on ProAmatine.  Intermittently blood pressure does dip down to a map of 50.  Maintaining adequate oxygen saturation.  Labs today show sodium of 133 potassium 4.5 chloride 23 BUN 44 creatinine 2.8 glucose 116 calcium 6.9    HPI -   known history of morbid obesity, BPH, hypertension, recent episode of acute renal failure from obstructive uropathy required admission to [facility] 12/26/2023, his creatinine had gone up to 30.  Imaging had shown bilateral hydronephrosis and significantly large prostate protruding into the bladder.  Staley catheter was placed creatinine came down to 1.1 and he was discharged to a skilled nursing facility 1/3/23.  According to the patient the catheter was hurting him and he was unable to ambulate so the Staley was removed at the [facility] facility.  Subsequently he was unable to urinate and then was transferred to [facility].  He was found to have urinary retention with bladder volume of more than a liter, they were unable to place Staley.  He then developed tachycardia.  CT PE showed large left pulmonary embolism without RV strain.  CT abdomen and pelvis with contrast showed massively enlarged prostate protruding into the bladder, thickening of bladder wall bilateral 
Renal Progress Note    Patient :  Jose Maria Ge; 68 y.o. MRN# [id number]  Location:  3010/3010-01  Attending:  Marcus Thakkar MD  Admit Date:  1/9/2024   Hospital Day: 1    Subjective:       Patient seen and examined bedside  No acute issues overnight  Staley was not draining this morning, had irrigation done by urology, currently on continuous bladder irrigation    He is awake alert oriented, denies any new complaints today    Has been febrile today with temperatures around 102, tachycardic yesterday, heart rate better now, blood pressure better today was hypotensive yesterday    Labs morning showed sodium 134, potassium 5, chloride 100, bicarb 24, 44, creatinine 3, anion gap 11, glucose 102  WBC 17.7, hemoglobin 7.6, platelets 168    Urine output last 24 hours 2.4 L, on sodium bicarb 75 mEq in half-normal saline at 125 cc/h    Blood cultures positive for Klebsiella pneumonia, on meropenem  On heparin infusion for acute PE    HPI -   known history of morbid obesity, BPH, hypertension, recent episode of acute renal failure from obstructive uropathy required admission to [facility] 12/26/2023, his creatinine had gone up to 30.  Imaging had shown bilateral hydronephrosis and significantly large prostate protruding into the bladder.  Staley catheter was placed creatinine came down to 1.1 and he was discharged to a skilled nursing facility 1/3/23.  According to the patient the catheter was hurting him and he was unable to ambulate so the Staley was removed at the [facility] facility.  Subsequently he was unable to urinate and then was transferred to [facility].  He was found to have urinary retention with bladder volume of more than a liter, they were unable to place Staley.  He then developed tachycardia.  CT PE showed large left pulmonary embolism without RV strain.  CT abdominal and pelvis with contrast showed massively enlarged prostate protruding into the bladder, thickening of bladder wall bilateral hydronephrosis.  
Report given to CAR 2 RNNatanael. All questions answered.   
[facility]  Office: [phone]  Lorenzo Sosa DO, Tanner Angulo DO, Quintin Donnelly DO, Mikael Tomas DO, Kale Garcia MD, Jennie Guerrier MD, Marie Abdi MD, Radha Shah MD,  Bernard Corrales MD, Ulices Moura MD, Debra Burns MD,  Papa Hogue DO, Abbi Stevens MD, Darion Dumont MD, Todd Sosa DO, Bre Carlos MD,  William Billingsley DO, Alessandra Bernardo MD, Jamia Bay MD, Cyndi Olivia MD, Dom Ruiz MD,  Wilbert Velasquez MD, Mariama Richards MD, Annita Yao MD, Michelle Ortiz MD, Aguilar Vigil MD, Bishop Mcgee MD, Luis Cook DO, Getachew Oseguera DO, Luna Steward MD,  Jeremiah Charlton MD, Shirley Waterhouse, CNP,  Twyla Elena CNP, Tico Taylor, CNP,  Sara Marinelli, STEVEN, Suzette Liz, CNP, Bonnie Bruno, CNP, Marjorie Samaniego CNP, Izabela Ayala CNP, Meghann Olivia, CNP, Viviane Mcpherson, PA-C, Kanwal Nichols, PA-C, Mitzi Quintana, CNP, Amina Meyers, CNS, Jacquie Erickson, CNP, Arlette Castellano CNP, Tracy Schwab, CNP         [facility]   IN-PATIENT SERVICE   [facility]    Progress Note    1/13/2024    3:25 PM    Name:   Jose Mraia Ge  MRN:     [id number]     Acct:      [id number]   Room:   2022/2022-01  IP Day:  4  Admit Date:  1/9/2024 12:09 AM    PCP:   No primary care provider on file.  Code Status:  Full Code    Subjective:     C/C:   Chief Complaint   Patient presents with    Tachycardia    Chest Pain     Staley catheter problem     Interval History Status: not changed.     Patient seen and examined. CBI some pink urine    Brief History:     68-year-old male past medical history of obstructive uropathy, acute renal failure, hydronephrosis, hypertension, gout, BPH, presented to [place] ER with acute urinary retention abdominal pain unable to pass Staley and transferred to [facility] for urology evaluation.  Patient went into SVT found to have elevated D-dimer and CT PE showing saddle pulmonary embolism with no right 
[facility]  Office: [phone]  Lorenzo Sosa DO, Tanner Angulo DO, Quintin Donnelly DO, Mikael Tomas DO, Kale Garcia MD, Jennie Guerrier MD, Marie Abdi MD, Radha Shah MD,  Bernard Corrales MD, Ulices Moura MD, Debra Burns MD,  Papa Hogue DO, Abbi Stevens MD, Darion Dumont MD, Todd Sosa DO, Bre Carlos MD,  William Billingsley DO, Alessandra Bernardo MD, Jamia Bay MD, Cyndi Olivia MD, Dom Ruiz MD,  Wilbert Velasquez MD, Mariama Richards MD, Annita Yao MD, Michelle Ortiz MD, Aguilar Vigil MD, Bishop Mcgee MD, Luis Cook DO, Getachew Oseguera DO, Luna Steward MD,  Jeremiah Charlton MD, Shirley Waterhouse, CNP,  Twyla Elena CNP, Tico Taylor, CNP,  Sara Marinelli, STEVEN, Suzette Liz, CNP, Bonnie Bruno, CNP, Marjorie Samaniego CNP, Izabela Ayala CNP, Meghann Olivia, CNP, Viviane Mcpherson, PA-C, Kanwal Nichols, PA-C, Mitzi Quintana, CNP, Amina Meyers, CNS, Jacquie Erickson, CNP, Arlette Castellano, CNP, Tracy Schwab, CNP         [facility]   IN-PATIENT SERVICE   [facility]    Progress Note    1/14/2024    12:37 PM    Name:   Jose Maria Ge  MRN:     [id number]     Acct:      [id number]   Room:   2022/2022-01  IP Day:  5  Admit Date:  1/9/2024 12:09 AM    PCP:   No primary care provider on file.  Code Status:  Full Code    Subjective:     C/C:   Chief Complaint   Patient presents with    Tachycardia    Chest Pain     Staley catheter problem     Interval History Status: not changed.     Patient seen and examined. Has CBI in places till, urine clearing up.    Brief History:     68-year-old male past medical history of obstructive uropathy, acute renal failure, hydronephrosis, hypertension, gout, BPH, presented to [place] [facility] with acute urinary retention abdominal pain unable to pass Staley and transferred to [facility] for urology evaluation.  Patient went into SVT found to have elevated D-dimer and CT PE showing saddle pulmonary 
This patient was never interviewed nor examined by me. I did not participate in the care of this patient.    
Vascular Surgery   Progress Note    PATIENT NAME: Jose Maria Ge     TODAY'S DATE: 1/10/2024, 10:00 AM    SUBJECTIVE:     Pt seen and examined at bedside.  No acute overnight events. Pt reports breathing easier today. His heart rate is now in the 90s. He continues to need supplemental oxygen. He is on heparin gtt. Pt has light hematuria in keys bag, urology consulted by primary team. Pt's right femoral access site with some oozing overnight, clean on exam today.    OBJECTIVE:   VITALS:  /63   Pulse 96   Temp (!) 102.3 °F (39.1 °C) (Oral)   Resp 25   Ht 1.92 m (6' 3.59\")   Wt 133.1 kg (293 lb 6.9 oz)   SpO2 100%   BMI 36.11 kg/m²      INTAKE/OUTPUT:      Intake/Output Summary (Last 24 hours) at 1/10/2024 1000  Last data filed at 1/10/2024 0927  Gross per 24 hour   Intake 2674.74 ml   Output 2435 ml   Net 239.74 ml       PHYSICAL EXAM:  General appearance - alert, not acutely distressed   Mental status - oriented to person, place and time with normal affect  Head - normocephalic and atraumatic  Eyes - pupils equal and reactive, extraocular eye movements intact, conjunctiva clear  Ears - hearing appears to be intact  Nose - no drainage noted  Mouth - mucous membranes moist  Neck - supple, trachea midline   Chest - no increased effort on NC, no wheezing   Heart - tachycardic rate, regular rhythm  Abdomen - soft, non-tender, non-distended  Neurological - normal speech, no focal findings or movement disorder noted, cranial nerves II through XII grossly intact  Extremities - RLE edema present, no cyanosis, motor and sensation intact       Data:  CBC:   Recent Labs     01/08/24 2030 01/09/24 0027 01/09/24  0927 01/09/24  2031 01/10/24  0310 01/10/24  0824   WBC 12.0* 13.0*  --   --   --  17.7*   HGB 10.1* 10.2*   < > 7.3* 8.1* 7.6*    See Reflexed IPF Result  --   --   --  168    < > = values in this interval not displayed.     Chemistry:   Recent Labs     01/08/24 2030 01/09/24  0027 01/09/24  0130 
Writer attempted to call report but facility did not answer.   
  01/09/24 0930 139/65 -- -- (!) 115 23 100 %   01/09/24 0915 131/78 -- -- (!) 115 25 100 %   01/09/24 0900 126/78 -- -- (!) 118 27 95 %   01/09/24 0846 108/68 -- -- (!) 118 27 99 %   01/09/24 0845 -- -- -- (!) 118 26 97 %   01/09/24 0837 -- -- -- (!) 119 27 93 %   01/09/24 0830 133/68 -- -- (!) 118 27 94 %   01/09/24 0829 (!) 150/74 99 °F (37.2 °C) Oral (!) 119 28 94 %       Intake/Output Summary (Last 24 hours) at 1/10/2024 0743  Last data filed at 1/10/2024 0634  Gross per 24 hour   Intake 2974.74 ml   Output 2416 ml   Net 558.74 ml       Recent Labs     01/08/24 2030 01/09/24  0027 01/09/24 0927 01/09/24  1447 01/09/24  2031 01/10/24  0310   WBC 12.0* 13.0*  --   --   --   --    HGB 10.1* 10.2*   < > 8.5* 7.3* 8.1*   HCT 30.5* 32.4*   < > 26.8* 22.7* 27.0*   MCV 83.7 86.6  --   --   --   --     See Reflexed IPF Result  --   --   --   --     < > = values in this interval not displayed.     Recent Labs     01/09/24  0027 01/09/24  1759 01/10/24  0310   * 132* 136   K 4.8 4.8 5.0   CL 97* 99 101   CO2 16* 22 24   BUN 39* 43* 43*   CREATININE 4.4* 3.6* 3.2*       Recent Labs     01/09/24  0057   COLORU Yellow   PHUR 6.0   WBCUA TOO NUMEROUS TO COUNT   RBCUA 20 TO 50   BACTERIA MANY*   SPECGRAV 1.012   LEUKOCYTESUR LARGE*   UROBILINOGEN Normal   BILIRUBINUR NEGATIVE       Additional Lab/culture results:    Physical Exam:   General: NAD  HEENT: normocephalic, atraumatic  Cardiovascular: Acyanotic, pulses palpable  Respiratory: nonlabored breathing  Abdomen: soft, nontender, nondistended  : no SP tenderness of flank pain, Keys light pink once draining  Skin: warm and dry  Extremities: bilateral LE edema  Psych: normal mood and mentation    Interval Imaging Findings: none    Impression:  69 yo male with  Urinary retention, bladder scan was > 1.1L at outside hospital  Difficult keys catheter s/p bedside cystoscopy with Keys placement over a wire        Plan:   Maintain Keys catheter, secure 
[id number] kcals/day  Weight Used for Protein Requirements: Ideal  Protein (g/day): 110-140 gm pro/day  Fluid (ml/day): per MD    Nutrition Diagnosis:   Inadequate oral intake related to  (current condition) as evidenced by poor intake prior to admission (variable PO intakes)    Nutrition Interventions:   Food and/or Nutrient Delivery: Continue Current Diet (Monitor need for oral supplements.)  Nutrition Education/Counseling: No recommendation at this time  Coordination of Nutrition Care: Continue to monitor while inpatient  Plan of Care discussed with: Patient    Goals:  Previous Goal Met:  (Goal Set)  Goals: PO intake 75% or greater, prior to discharge       Nutrition Monitoring and Evaluation:   Behavioral-Environmental Outcomes: None Identified  Food/Nutrient Intake Outcomes: Food and Nutrient Intake  Physical Signs/Symptoms Outcomes: Biochemical Data, GI Status, Fluid Status or Edema, Hemodynamic Status, Weight, Skin, Nutrition Focused Physical Findings    Discharge Planning:    Too soon to determine     Jacquelyn Riggs RD, LD  Contact: [phone]  
have elevated D-dimer and CT PE showing saddle pulmonary embolism with no right heart strain.  Patient underwent mechanical thrombectomy with vascular surgery on 1/9/2023.  Patient was evaluated by nephrology as well as infectious disease.  Urology was also consulted.  Patient found to have Klebsiella septicemia on meropenem.  Patient started on CBI and with plans for clamp trial.  And to hand irrigate as needed.  Plan for formal cystoscopy in the OR as outpatient.  Antibiotics were switched to ceftriaxone 2 g daily for plan of a total of 3 weeks of IV antibiotics. Received 1 unit RBC total.      Review of Systems:     Review of Systems   Constitutional:  Negative for activity change and fatigue.   HENT:  Negative for congestion and dental problem.    Respiratory:  Negative for choking and chest tightness.    Cardiovascular:  Negative for chest pain and leg swelling.   Gastrointestinal:  Negative for abdominal distention and abdominal pain.   Endocrine: Negative for polydipsia and polyphagia.   Genitourinary:  Positive for hematuria. Negative for dysuria and enuresis.   Musculoskeletal:  Negative for arthralgias and back pain.   Neurological:  Negative for dizziness, tremors, syncope and weakness.   Psychiatric/Behavioral:  Negative for agitation and behavioral problems.        Medications:     Allergies:  No Known Allergies    Current Meds:   Scheduled Meds:    levoFLOXacin  750 mg Oral Daily    apixaban  10 mg Oral BID    Followed by    [START ON 1/21/2024] apixaban  5 mg Oral BID    pantoprazole  40 mg Oral QAM AC    carvedilol  6.25 mg Oral BID WC    atorvastatin  40 mg Oral Nightly    sodium chloride flush  5-40 mL IntraVENous 2 times per day    sodium chloride flush  5-40 mL IntraVENous 2 times per day     Continuous Infusions:    sodium chloride      sodium chloride      sodium chloride 5 mL/hr at 01/15/24 1532    sodium chloride      sodium chloride 10 mL/hr at 01/12/24 1235    sod chloride IRR soln      
([facility]) 01/09/2024    Renal failure syndrome 01/09/2024    Pyelonephritis 01/09/2024    Bandemia 01/09/2024    Septicemia due to Klebsiella pneumoniae ([facility]) 01/09/2024       Recommended Follow-up:     F/u H&H   Transition to oral AC once able   F/u urology recs and pts constant bladder irrigation   Continue to follow-up infectious disease recommendations         Above mentioned assessment and plan was discussed by me with the admitting medicine resident. The medicine team assigned to the patient by medicine admitting resident will be following up the patient from now onwards on the floor.     Ulisses Kirkland, DO  Emergency Medicine Resident  Critical Care Service       
social history, and family history, and I haveupdated the database accordingly.      Allergies:   Patient has no known allergies.     Review of Systems:     Review of Systems   Constitutional:  Positive for activity change. Negative for chills and diaphoresis.   HENT:  Negative for congestion.    Eyes:  Negative for pain, discharge and redness.   Respiratory:  Negative for apnea.    Cardiovascular:  Negative for chest pain.   Gastrointestinal:  Negative for abdominal distention.   Endocrine: Negative for cold intolerance.   Genitourinary:  Negative for dysuria.   Musculoskeletal:  Negative for arthralgias.   Skin:  Negative for color change.   Allergic/Immunologic: Negative for immunocompromised state.   Neurological:  Negative for dizziness.   Hematological:  Negative for adenopathy.   Psychiatric/Behavioral:  Negative for agitation.        Physical Examination :       Physical Exam  Constitutional:       General: He is not in acute distress.     Appearance: Normal appearance. He is not ill-appearing or toxic-appearing.   HENT:      Head: Normocephalic and atraumatic.      Nose: Nose normal.      Mouth/Throat:      Mouth: Mucous membranes are moist.   Eyes:      General: No scleral icterus.     Conjunctiva/sclera: Conjunctivae normal.   Cardiovascular:      Rate and Rhythm: Normal rate and regular rhythm.      Heart sounds: Normal heart sounds. No murmur heard.  Pulmonary:      Breath sounds: No stridor. No rhonchi.   Abdominal:      Palpations: There is no mass.      Hernia: No hernia is present.   Genitourinary:     Comments: Urine bloody w keys  Musculoskeletal:         General: No swelling or deformity.      Cervical back: Neck supple. No rigidity or tenderness.      Right lower leg: Edema (R DVT) present.   Skin:     Coloration: Skin is not jaundiced.      Findings: No bruising.   Neurological:      General: No focal deficit present.      Mental Status: He is alert.      Cranial Nerves: No cranial nerve 
A Lot  How much help is needed standing up from a chair using your arms?: A Lot  How much help is needed walking in hospital room?: A Lot  How much help is needed climbing 3-5 steps with a railing?: Total  AM-PAC Inpatient Mobility Raw Score : 13  AM-PAC Inpatient T-Scale Score : 36.74  Mobility Inpatient CMS 0-100% Score: 64.91  Mobility Inpatient CMS G-Code Modifier : CL           Goals  Short Term Goals  Time Frame for Short Term Goals: 14 visits  Short Term Goal 1: Pt will perform sit<>stand transfer with SBA.  Short Term Goal 2: Pt will ambulate 75 feet with a RW and SBA.  Short Term Goal 3: Pt will demonstrate good- dynamic standing balance to decrease fall risk.  Short Term Goal 4: Pt will perform 300 feet of manual wheelchair propulsion with supervision.  Short Term Goal 5: Pt will tolerate a 35 minute therapy session to promote increased endurance.       Education  Patient Education  Education Given To: Patient  Education Provided: Role of Therapy;Plan of Care  Education Method: Verbal  Barriers to Learning: None  Education Outcome: Verbalized understanding      Therapy Time   Individual Concurrent Group Co-treatment   Time In 0956         Time Out 1028         Minutes 32         Timed Code Treatment Minutes: 8 Minutes       Jan Madrigal PT         
--   --   --    MPV 7.8  --   --   --   --   --     < > = values in this interval not displayed.        PT/INR:    Lab Results   Component Value Date/Time    PROTIME 16.6 01/09/2024 12:27 AM    INR 1.4 01/09/2024 12:27 AM     PTT:    Lab Results   Component Value Date/Time    APTT 29.4 01/09/2024 12:27 AM       Basal Metabolic Profile:   Recent Labs     01/09/24  0027 01/09/24  1759 01/10/24  0310   * 132* 136   K 4.8 4.8 5.0   BUN 39* 43* 43*   CREATININE 4.4* 3.6* 3.2*   CL 97* 99 101   CO2 16* 22 24      Magnesium:   Lab Results   Component Value Date/Time    MG 2.0 01/09/2024 12:27 AM    MG 1.8 01/08/2024 08:30 PM     Phosphorus: No results found for: \"PHOS\"  S. Calcium:  Recent Labs     01/10/24  0310   CALCIUM 7.8*     S. Ionized Calcium:No results for input(s): \"IONCA\" in the last 72 hours.      Urinalysis:   Lab Results   Component Value Date/Time    NITRU NEGATIVE 01/09/2024 12:57 AM    COLORU Yellow 01/09/2024 12:57 AM    PHUR 6.0 01/09/2024 12:57 AM    WBCUA TOO NUMEROUS TO COUNT 01/09/2024 12:57 AM    RBCUA 20 TO 50 01/09/2024 12:57 AM    BACTERIA MANY 01/09/2024 12:57 AM    SPECGRAV 1.012 01/09/2024 12:57 AM    LEUKOCYTESUR LARGE 01/09/2024 12:57 AM    UROBILINOGEN Normal 01/09/2024 12:57 AM    BILIRUBINUR NEGATIVE 01/09/2024 12:57 AM    GLUCOSEU NEGATIVE 01/09/2024 12:57 AM    KETUA NEGATIVE 01/09/2024 12:57 AM       CARDIAC ENZYMES: No results for input(s): \"CKMB\", \"CKMBINDEX\", \"TROPONINI\" in the last 72 hours.    Invalid input(s): \"CKTOTAL;3\"  BNP: No results for input(s): \"BNP\" in the last 72 hours.    LFTS  Recent Labs     01/08/24 2030 01/09/24  0027   ALKPHOS 80 72   ALT 28 24   AST 23 22   BILITOT 0.6 0.5   BILIDIR 0.2  --    LABALBU 3.5 2.9*       AMYLASE/LIPASE/AMMONIA  No results for input(s): \"AMYLASE\", \"LIPASE\", \"AMMONIA\" in the last 72 hours.    Last 3 Blood Glucose:   Recent Labs     01/08/24 2030 01/09/24  0027 01/09/24  1759 01/10/24  0310   GLUCOSE 136* 64* 119* 108*    
--   Output 200 ml   Net -200 ml       Microbiology:  Recent Labs     01/11/24  1440   SPECDESC .BLOOD   SPECIAL LEFT HAND 5ML   CULTURE NO GROWTH 2 DAYS       Pathology:    Radiology reports:  CT ABDOMEN PELVIS WO CONTRAST Additional Contrast? None   Final Result   1. No evidence of retroperitoneal hemorrhage.   2. Interval improvement of bilateral hydronephrosis.   3. Staley catheter in place within the urinary bladder, which demonstrates   wall thickening and surrounding fat stranding. Correlate with urinalysis.   4. Layering intermediate density within the gallbladder may represent sludge   and/or small gallstones.   5. Cardiomegaly with small bilateral pleural effusions.         Vascular duplex lower extremity venous bilateral   Final Result      CT CHEST PULMONARY EMBOLISM W CONTRAST   Final Result   1. Large pulmonary embolism extending from the bifurcation of the main   pulmonary artery through the left main pulmonary artery and through the lower   lobar branches all the way to the distal portions of upper lobar branches of   left pulmonary artery. No evidence of right ventricular strain.   2. Markedly enlarged prostate. Large mass in the bladder, likely to be due to   markedly enlarged prostate protruding into most of the bladder cavity.   Primary bladder neoplasm cannot be excluded.  Hemorrhage in bladder cannot be   excluded.  Moderate to marked thickening of the bladder wall.  Moderate to   marked stranding and hazy densities around the thickened bladder wall.   Ongoing cystitis not excluded.   3. Moderate bilateral hydronephrosis and hydroureter.  No urinary calculus.   4. Mild atelectatic changes at the posterior right lung base.   5. Mild cardiomegaly.   6. Other incidental findings as above.   The findings have been discussed by me with Dr. Caitlyn Garcia at 4:03   a.m. on 01/09/2024.         CT ABDOMEN PELVIS W IV CONTRAST Additional Contrast? None   Final Result   1. Large pulmonary embolism 
--  228   LYMPHOPCT 10*  --   --  12*   MONOPCT 8  --   --  10    < > = values in this interval not displayed.       BMP:  Recent Labs     01/12/24  0842 01/13/24  0339   * 135   K 4.2 4.7    102   CO2 21 23   BUN 25* 20   CREATININE 1.4* 1.2       Hepatic Function Panel:   No results for input(s): \"PROT\", \"LABALBU\", \"BILIDIR\", \"IBILI\", \"BILITOT\", \"ALKPHOS\", \"ALT\", \"AST\" in the last 72 hours.    No results for input(s): \"RPR\" in the last 72 hours.  No results for input(s): \"HIV\" in the last 72 hours.  No results for input(s): \"BC\" in the last 72 hours.  Lab Results   Component Value Date/Time    CREATININE 1.2 01/13/2024 03:39 AM    GLUCOSE 106 01/13/2024 03:39 AM       Detailed results:        Thank you for allowing us to participate in the care of this patient.Please call with questions.    This note is created with the assistance of a speech recognition program.  While intending to generate adocument that actually reflects the content of the visit, the document can still have some errors including those of syntax and sound a like substitutions which may escape proof reading.  It such instances, actual meaningcan be extrapolated by contextual diversion.    Diane De La O MD  Office: [phone]  Perfect serve / office [phone]          
Nasir Curry MD on 1/12/2024 at 10:01 AM           
\"LABURIC\", \"URICACID\"  No results found for: \"IRON\", \"TIBC\", \"FERRITIN\"  No results found for: \"SPEP\", \"UPEP\"  No results found for: \"PSA\", \"CEA\", \"\", \"OA2726\", \"\"    Input/Output:    Intake/Output Summary (Last 24 hours) at 1/15/2024 1316  Last data filed at 1/15/2024 1308  Gross per 24 hour   Intake 498 ml   Output 475 ml   Net 23 ml         Microbiology:  No results for input(s): \"SPECDESC\", \"SPECIAL\", \"CULTURE\", \"STATUS\", \"ORG\", \"CDIFFTOXPCR\", \"CAMPYLOBPCR\", \"SALMONELLAPC\", \"SHIGAPCR\", \"SHIGELLAPCR\", \"MPNEUG\", \"MPNEUM\", \"LACTOQL\" in the last 72 hours.      Pathology:    Radiology reports:  CT ABDOMEN PELVIS WO CONTRAST Additional Contrast? None   Final Result   1. No evidence of retroperitoneal hemorrhage.   2. Interval improvement of bilateral hydronephrosis.   3. Staley catheter in place within the urinary bladder, which demonstrates   wall thickening and surrounding fat stranding. Correlate with urinalysis.   4. Layering intermediate density within the gallbladder may represent sludge   and/or small gallstones.   5. Cardiomegaly with small bilateral pleural effusions.         Vascular duplex lower extremity venous bilateral   Final Result      CT CHEST PULMONARY EMBOLISM W CONTRAST   Final Result   1. Large pulmonary embolism extending from the bifurcation of the main   pulmonary artery through the left main pulmonary artery and through the lower   lobar branches all the way to the distal portions of upper lobar branches of   left pulmonary artery. No evidence of right ventricular strain.   2. Markedly enlarged prostate. Large mass in the bladder, likely to be due to   markedly enlarged prostate protruding into most of the bladder cavity.   Primary bladder neoplasm cannot be excluded.  Hemorrhage in bladder cannot be   excluded.  Moderate to marked thickening of the bladder wall.  Moderate to   marked stranding and hazy densities around the thickened bladder wall.   Ongoing cystitis not excluded.

## 2024-01-19 ENCOUNTER — APPOINTMENT (OUTPATIENT)
Dept: GENERAL RADIOLOGY | Age: 69
DRG: 872 | End: 2024-01-19
Payer: COMMERCIAL

## 2024-01-19 ENCOUNTER — APPOINTMENT (OUTPATIENT)
Dept: CT IMAGING | Age: 69
DRG: 872 | End: 2024-01-19
Payer: COMMERCIAL

## 2024-01-19 ENCOUNTER — HOSPITAL ENCOUNTER (INPATIENT)
Age: 69
LOS: 4 days | Discharge: NURSING, REHAB OR LONG-TERM CARE | DRG: 872 | End: 2024-01-23
Attending: EMERGENCY MEDICINE | Admitting: INTERNAL MEDICINE
Payer: COMMERCIAL

## 2024-01-19 DIAGNOSIS — N39.0 URINARY TRACT INFECTION ASSOCIATED WITH INDWELLING URETHRAL CATHETER, SUBSEQUENT ENCOUNTER: ICD-10-CM

## 2024-01-19 DIAGNOSIS — T83.511D URINARY TRACT INFECTION ASSOCIATED WITH INDWELLING URETHRAL CATHETER, SUBSEQUENT ENCOUNTER: ICD-10-CM

## 2024-01-19 DIAGNOSIS — R11.2 NAUSEA AND VOMITING, UNSPECIFIED VOMITING TYPE: ICD-10-CM

## 2024-01-19 DIAGNOSIS — N17.9 AKI (ACUTE KIDNEY INJURY) (HCC): Primary | ICD-10-CM

## 2024-01-19 LAB
ALBUMIN SERPL-MCNC: 2.6 G/DL (ref 3.5–5.2)
ALP SERPL-CCNC: 95 U/L (ref 40–129)
ALT SERPL-CCNC: 26 U/L (ref 5–41)
ANION GAP SERPL CALCULATED.3IONS-SCNC: 14 MMOL/L (ref 9–17)
AST SERPL-CCNC: 27 U/L
BACTERIA URNS QL MICRO: NORMAL
BASOPHILS # BLD: 0.04 K/UL (ref 0–0.2)
BASOPHILS NFR BLD: 0 % (ref 0–2)
BILIRUB SERPL-MCNC: 0.5 MG/DL (ref 0.3–1.2)
BILIRUB UR QL STRIP: ABNORMAL
BUN SERPL-MCNC: 30 MG/DL (ref 8–23)
CALCIUM SERPL-MCNC: 8.4 MG/DL (ref 8.6–10.4)
CASTS #/AREA URNS LPF: NORMAL /LPF (ref 0–8)
CHLORIDE SERPL-SCNC: 99 MMOL/L (ref 98–107)
CLARITY UR: ABNORMAL
CO2 SERPL-SCNC: 20 MMOL/L (ref 20–31)
COLOR UR: ABNORMAL
CREAT SERPL-MCNC: 3.9 MG/DL (ref 0.7–1.2)
CREAT UR-MCNC: 10.7 MG/DL (ref 39–259)
EOSINOPHIL # BLD: 0.15 K/UL (ref 0–0.44)
EOSINOPHILS RELATIVE PERCENT: 1 % (ref 1–4)
EPI CELLS #/AREA URNS HPF: NORMAL /HPF (ref 0–5)
ERYTHROCYTE [DISTWIDTH] IN BLOOD BY AUTOMATED COUNT: 14.7 % (ref 11.8–14.4)
GFR SERPL CREATININE-BSD FRML MDRD: 16 ML/MIN/1.73M2
GLUCOSE SERPL-MCNC: 92 MG/DL (ref 70–99)
GLUCOSE UR STRIP-MCNC: NEGATIVE MG/DL
HCT VFR BLD AUTO: 27.6 % (ref 40.7–50.3)
HGB BLD-MCNC: 8.3 G/DL (ref 13–17)
HGB UR QL STRIP.AUTO: ABNORMAL
IMM GRANULOCYTES # BLD AUTO: 0.18 K/UL (ref 0–0.3)
IMM GRANULOCYTES NFR BLD: 1 %
KETONES UR STRIP-MCNC: NEGATIVE MG/DL
LACTIC ACID, SEPSIS WHOLE BLOOD: 2.1 MMOL/L (ref 0.5–1.9)
LACTIC ACID, WHOLE BLOOD: 2.1 MMOL/L (ref 0.7–2.1)
LEUKOCYTE ESTERASE UR QL STRIP: ABNORMAL
LIPASE SERPL-CCNC: 25 U/L (ref 13–60)
LYMPHOCYTES NFR BLD: 1.23 K/UL (ref 1.1–3.7)
LYMPHOCYTES RELATIVE PERCENT: 8 % (ref 24–43)
MCH RBC QN AUTO: 27.2 PG (ref 25.2–33.5)
MCHC RBC AUTO-ENTMCNC: 30.1 G/DL (ref 28.4–34.8)
MCV RBC AUTO: 90.5 FL (ref 82.6–102.9)
MONOCYTES NFR BLD: 0.6 K/UL (ref 0.1–1.2)
MONOCYTES NFR BLD: 4 % (ref 3–12)
NEUTROPHILS NFR BLD: 86 % (ref 36–65)
NEUTS SEG NFR BLD: 12.42 K/UL (ref 1.5–8.1)
NITRITE UR QL STRIP: POSITIVE
NRBC BLD-RTO: 0 PER 100 WBC
PH UR STRIP: 7.5 [PH] (ref 5–8)
PLATELET # BLD AUTO: 418 K/UL (ref 138–453)
PMV BLD AUTO: 10 FL (ref 8.1–13.5)
POTASSIUM SERPL-SCNC: 4.7 MMOL/L (ref 3.7–5.3)
PROCALCITONIN SERPL-MCNC: 0.82 NG/ML
PROT SERPL-MCNC: 8.3 G/DL (ref 6.4–8.3)
PROT UR STRIP-MCNC: ABNORMAL MG/DL
RBC # BLD AUTO: 3.05 M/UL (ref 4.21–5.77)
RBC # BLD: ABNORMAL 10*6/UL
RBC #/AREA URNS HPF: NORMAL /HPF (ref 0–4)
SODIUM SERPL-SCNC: 133 MMOL/L (ref 135–144)
SODIUM UR-SCNC: 134 MMOL/L
SP GR UR STRIP: 1.02 (ref 1–1.03)
UROBILINOGEN UR STRIP-ACNC: NORMAL EU/DL (ref 0–1)
WBC #/AREA URNS HPF: NORMAL /HPF (ref 0–5)
WBC OTHER # BLD: 14.6 K/UL (ref 3.5–11.3)

## 2024-01-19 PROCEDURE — 96365 THER/PROPH/DIAG IV INF INIT: CPT

## 2024-01-19 PROCEDURE — G0103 PSA SCREENING: HCPCS

## 2024-01-19 PROCEDURE — 81001 URINALYSIS AUTO W/SCOPE: CPT

## 2024-01-19 PROCEDURE — 2580000003 HC RX 258

## 2024-01-19 PROCEDURE — 96375 TX/PRO/DX INJ NEW DRUG ADDON: CPT

## 2024-01-19 PROCEDURE — A4216 STERILE WATER/SALINE, 10 ML: HCPCS

## 2024-01-19 PROCEDURE — 86850 RBC ANTIBODY SCREEN: CPT

## 2024-01-19 PROCEDURE — 80053 COMPREHEN METABOLIC PANEL: CPT

## 2024-01-19 PROCEDURE — 87086 URINE CULTURE/COLONY COUNT: CPT

## 2024-01-19 PROCEDURE — 84300 ASSAY OF URINE SODIUM: CPT

## 2024-01-19 PROCEDURE — 96374 THER/PROPH/DIAG INJ IV PUSH: CPT

## 2024-01-19 PROCEDURE — 2580000003 HC RX 258: Performed by: EMERGENCY MEDICINE

## 2024-01-19 PROCEDURE — 71045 X-RAY EXAM CHEST 1 VIEW: CPT

## 2024-01-19 PROCEDURE — 84443 ASSAY THYROID STIM HORMONE: CPT

## 2024-01-19 PROCEDURE — 82570 ASSAY OF URINE CREATININE: CPT

## 2024-01-19 PROCEDURE — 83605 ASSAY OF LACTIC ACID: CPT

## 2024-01-19 PROCEDURE — 85652 RBC SED RATE AUTOMATED: CPT

## 2024-01-19 PROCEDURE — 82728 ASSAY OF FERRITIN: CPT

## 2024-01-19 PROCEDURE — 86920 COMPATIBILITY TEST SPIN: CPT

## 2024-01-19 PROCEDURE — 85025 COMPLETE CBC W/AUTO DIFF WBC: CPT

## 2024-01-19 PROCEDURE — 6370000000 HC RX 637 (ALT 250 FOR IP)

## 2024-01-19 PROCEDURE — C9113 INJ PANTOPRAZOLE SODIUM, VIA: HCPCS

## 2024-01-19 PROCEDURE — 86900 BLOOD TYPING SEROLOGIC ABO: CPT

## 2024-01-19 PROCEDURE — 99223 1ST HOSP IP/OBS HIGH 75: CPT | Performed by: INTERNAL MEDICINE

## 2024-01-19 PROCEDURE — 6360000002 HC RX W HCPCS

## 2024-01-19 PROCEDURE — 87040 BLOOD CULTURE FOR BACTERIA: CPT

## 2024-01-19 PROCEDURE — 85610 PROTHROMBIN TIME: CPT

## 2024-01-19 PROCEDURE — 6360000002 HC RX W HCPCS: Performed by: EMERGENCY MEDICINE

## 2024-01-19 PROCEDURE — 1200000000 HC SEMI PRIVATE

## 2024-01-19 PROCEDURE — 99285 EMERGENCY DEPT VISIT HI MDM: CPT

## 2024-01-19 PROCEDURE — 84145 PROCALCITONIN (PCT): CPT

## 2024-01-19 PROCEDURE — 83690 ASSAY OF LIPASE: CPT

## 2024-01-19 PROCEDURE — 86901 BLOOD TYPING SEROLOGIC RH(D): CPT

## 2024-01-19 PROCEDURE — 93005 ELECTROCARDIOGRAM TRACING: CPT

## 2024-01-19 PROCEDURE — 74176 CT ABD & PELVIS W/O CONTRAST: CPT

## 2024-01-19 PROCEDURE — 83550 IRON BINDING TEST: CPT

## 2024-01-19 PROCEDURE — 83540 ASSAY OF IRON: CPT

## 2024-01-19 RX ORDER — ONDANSETRON 2 MG/ML
4 INJECTION INTRAMUSCULAR; INTRAVENOUS ONCE
Status: COMPLETED | OUTPATIENT
Start: 2024-01-19 | End: 2024-01-19

## 2024-01-19 RX ORDER — 0.9 % SODIUM CHLORIDE 0.9 %
30 INTRAVENOUS SOLUTION INTRAVENOUS ONCE
Status: COMPLETED | OUTPATIENT
Start: 2024-01-19 | End: 2024-01-19

## 2024-01-19 RX ORDER — SODIUM CHLORIDE 9 MG/ML
INJECTION, SOLUTION INTRAVENOUS CONTINUOUS
Status: DISCONTINUED | OUTPATIENT
Start: 2024-01-19 | End: 2024-01-20

## 2024-01-19 RX ORDER — TAMSULOSIN HYDROCHLORIDE 0.4 MG/1
0.4 CAPSULE ORAL DAILY
Status: DISCONTINUED | OUTPATIENT
Start: 2024-01-20 | End: 2024-01-23 | Stop reason: HOSPADM

## 2024-01-19 RX ORDER — DEXTROSE MONOHYDRATE 100 MG/ML
INJECTION, SOLUTION INTRAVENOUS CONTINUOUS PRN
Status: DISCONTINUED | OUTPATIENT
Start: 2024-01-19 | End: 2024-01-23 | Stop reason: HOSPADM

## 2024-01-19 RX ADMIN — PANTOPRAZOLE SODIUM 40 MG: 40 INJECTION, POWDER, FOR SOLUTION INTRAVENOUS at 20:12

## 2024-01-19 RX ADMIN — PIPERACILLIN AND TAZOBACTAM 3375 MG: 3; .375 INJECTION, POWDER, LYOPHILIZED, FOR SOLUTION INTRAVENOUS at 20:03

## 2024-01-19 RX ADMIN — HYOSCYAMINE SULFATE 125 MCG: 0.12 TABLET ORAL; SUBLINGUAL at 23:03

## 2024-01-19 RX ADMIN — SODIUM CHLORIDE 2535 ML: 9 INJECTION, SOLUTION INTRAVENOUS at 18:25

## 2024-01-19 RX ADMIN — ONDANSETRON 4 MG: 2 INJECTION INTRAMUSCULAR; INTRAVENOUS at 18:25

## 2024-01-19 ASSESSMENT — PAIN SCALES - GENERAL: PAINLEVEL_OUTOF10: 9

## 2024-01-19 NOTE — ED PROVIDER NOTES
[facility] ED  Emergency Department Encounter  Emergency Medicine Resident     Pt Name:Jose Maria Ge  MRN: [id number]  Birthdate 1955  Date of evaluation: 1/19/24  PCP:  No primary care provider on file.  Note Started: 5:19 PM EST      CHIEF COMPLAINT       Chief Complaint   Patient presents with    Abdominal Pain       HISTORY OF PRESENT ILLNESS  (Location/Symptom, Timing/Onset, Context/Setting, Quality, Duration, Modifying Factors, Severity.)      Jose Maria Ge is a 68 y.o. male past medical history of recent submassive PE, history of DVTs, history of urinary retention, history of BPH, history of hypertension who presents with abdominal pain with nausea/vomiting.    Patient states he has had intermittent nausea for the past week or so.  Patient has also had various episodes of emesis.  Denies blood in his emesis.  Patient denies any other friend/family is having the symptoms.  Of note patient states that he has not been feeling constipated over the past week. Patient states last time he had a bowel movement was Monday.    Of note, patient was recently admitted to [facility] for submassive PE requiring thrombectomy, obstructive uropathy with bilateral pyelonephritis, septicemia with Klebsiella.  Patient initially had ICU admission due to submassive PE and obstructive uropathy with bilateral pyelonephritis.  Patient was initially on heparin during that admission but was transition to discharge.  Patient was also noted to have Klebsiella bacteremia secondary to pyelonephritis.  Patient was given Levaquin IV but was later transitioned to Levaquin p.o. on discharge.  Due to patient's obstructive uropathy patient had Staley placed during that admission.  Patient was discharged with Staley in place.  He still has Staley in place.    Bloody noses and hematuria since discharge.  He is also had fevers and chills intermittently.    PAST MEDICAL / SURGICAL / SOCIAL / FAMILY HISTORY      has no past  Physician who either signs or Co-signs this chart in the absence of a cardiologist.    EMERGENCY DEPARTMENT COURSE:    ED Course as of 01/19/24 2229 Fri Jan 19, 2024   1701 Patient presents to triage with abdominal pain and nausea/vomiting [NZ]   1727 Sepsis score and criteria met. Sepsis bundle ordered [NZ]   1825 Zofran given  [NZ]   1825 Fluid bolus started  [NZ]   1829 Lactic Acid, Sepsis, Whole Blood(!): 2.1 [NZ]   1829 CBC with Auto Differential(!):    WBC 14.6(!)   RBC 3.05(!)   Hemoglobin Quant 8.3(!)   Hematocrit 27.6(!)   MCV 90.5   MCH 27.2   MCHC 30.1   RDW 14.7(!)   Platelet Count 418   MPV 10.0   NRBC Automated 0.0   Neutrophils % 86(!)   Lymphocyte % 8(!)   Monocytes % 4   Eosinophils % 1   Basophils % 0   Immature Granulocytes 1(!)   Neutrophils Absolute 12.42(!)   Lymphocytes Absolute 1.23   Monocytes Absolute 0.60   Eosinophils Absolute 0.15   Basophils Absolute 0.04   Absolute Immature Granulocyte 0.18   RBC Morphology ANISOCYTOSIS PRESENT  Anemia and leukocytosis noted.  [NZ]   1847 Urinalysis with Reflex to Culture(!):    Color, UA Red(!)   Turbidity UA Turbid(!)   Glucose, UA NEGATIVE   Bilirubin, Urine NEGATIVE  Verified by ictotest.(!)   Ketones, Urine NEGATIVE   Specific Emerson, UA 1.023   Urine Hgb MODERATE(!)   pH, UA 7.5   Protein, UA 3+(!)   Urobilinogen, Urine Normal   Nitrite, Urine POSITIVE(!)   Leukocyte Esterase, Urine LARGE(!)  Continued signs of UTI  [NZ]   1918 Cxr- No acute cardiopulmonary abnormality is identified.  [NZ]   2003 Zosyn given  [NZ]   2012 Protonix given  [NZ]   2052 Creatinine(!): 3.9  Severe CHRIS. Cr 3 days ago was 0.9. [NZ]   2052 Procalcitonin(!): 0.82 [NZ]   2105 Lipase: 25 [NZ]   2213 Ct abd/pelvis- Interval development of markedly distended bladder with prostatomegaly.  Malpositioned Staley catheter is noted with tip and balloon within the distal  ureter.  Additionally, there is resultant bilateral hydroureteronephrosis,  likely related to bladder outlet

## 2024-01-19 NOTE — ED PROVIDER NOTES
Note Started: 6:49 PM MAGALI         [facility]     Emergency Department     Faculty Attestation    I performed a history and physical examination of the patient and discussed management with the resident. I reviewed the resident’s note and agree with the documented findings and plan of care. Any areas of disagreement are noted on the chart. I was personally present for the key portions of any procedures. I have documented in the chart those procedures where I was not present during the key portions. I have reviewed the emergency nurses triage note. I agree with the chief complaint, past medical history, past surgical history, allergies, medications, social and family history as documented unless otherwise noted below.        For Physician Assistant/ Nurse Practitioner cases/documentation I have personally evaluated this patient and have completed at least one if not all key elements of the E/M (history, physical exam, and MDM). Additional findings are as noted.  I have personally seen and evaluated the patient.  I find the patient's history and physical exam are consistent with the NP/PA documentation.  I agree with the care provided, treatment rendered, disposition and follow-up plan.    68-year-old male with recent PE on Eliquis, urinary retention requiring chronic Staley presenting with hematuria, multiple episodes of emesis, epigastric pain.  Has had intermittent hematuria in the past, but has been persistent at the facility for the last several days.  Denies any trauma to his penis or the catheter.  States that he has been very careful around the catheter.  He had a UTI and pyelonephritis and was taking outpatient Levaquin at his facility.  He denies any missed doses.  He states that he thinks he has been keeping down his medications until today.  No history of liver or renal failure.    Exam:  General : Laying on the bed, awake, alert, and in no acute distress  CV : normal rate and regular

## 2024-01-20 PROBLEM — R97.20 ELEVATED PSA: Status: ACTIVE | Noted: 2024-01-20

## 2024-01-20 PROBLEM — R79.89 TROPONIN LEVEL ELEVATED: Status: ACTIVE | Noted: 2024-01-20

## 2024-01-20 PROBLEM — Z86.718 HISTORY OF DEEP VENOUS THROMBOSIS: Status: ACTIVE | Noted: 2024-01-20

## 2024-01-20 PROBLEM — N30.00 ACUTE CYSTITIS: Status: ACTIVE | Noted: 2024-01-20

## 2024-01-20 PROBLEM — E83.51 HYPOCALCEMIA: Status: ACTIVE | Noted: 2024-01-20

## 2024-01-20 PROBLEM — Z79.01 CHRONIC ANTICOAGULATION: Status: ACTIVE | Noted: 2024-01-20

## 2024-01-20 PROBLEM — I44.0 FIRST DEGREE AV BLOCK: Status: ACTIVE | Noted: 2024-01-20

## 2024-01-20 PROBLEM — Z86.711 HISTORY OF PULMONARY EMBOLISM: Status: ACTIVE | Noted: 2024-01-20

## 2024-01-20 PROBLEM — D64.9 ANEMIA, NORMOCYTIC NORMOCHROMIC: Status: ACTIVE | Noted: 2024-01-20

## 2024-01-20 LAB
ANION GAP SERPL CALCULATED.3IONS-SCNC: 11 MMOL/L (ref 9–16)
ANTI-XA UNFRAC HEPARIN: >2 IU/L
BUN SERPL-MCNC: 31 MG/DL (ref 8–23)
CALCIUM SERPL-MCNC: 7.3 MG/DL (ref 8.6–10.4)
CHLORIDE SERPL-SCNC: 104 MMOL/L (ref 98–107)
CO2 SERPL-SCNC: 23 MMOL/L (ref 20–31)
CREAT SERPL-MCNC: 3.7 MG/DL (ref 0.7–1.2)
CRP SERPL HS-MCNC: 50.1 MG/L (ref 0–5)
D DIMER PPP FEU-MCNC: 2.31 UG/ML FEU (ref 0–0.57)
ERYTHROCYTE [DISTWIDTH] IN BLOOD BY AUTOMATED COUNT: 14.7 % (ref 11.8–14.4)
ERYTHROCYTE [SEDIMENTATION RATE] IN BLOOD BY PHOTOMETRIC METHOD: 68 MM/HR (ref 0–20)
FERRITIN SERPL-MCNC: 1286 NG/ML (ref 30–400)
GFR SERPL CREATININE-BSD FRML MDRD: 17 ML/MIN/1.73M2
GLUCOSE BLD-MCNC: 100 MG/DL (ref 75–110)
GLUCOSE BLD-MCNC: 106 MG/DL (ref 75–110)
GLUCOSE BLD-MCNC: 74 MG/DL (ref 75–110)
GLUCOSE SERPL-MCNC: 105 MG/DL (ref 74–99)
HCT VFR BLD AUTO: 25.2 % (ref 40.7–50.3)
HGB BLD-MCNC: 7.8 G/DL (ref 13–17)
INR PPP: 1.9
INR PPP: 2.3
IRON SATN MFR SERPL: 25 % (ref 20–55)
IRON SERPL-MCNC: 42 UG/DL (ref 59–158)
LACTIC ACID, WHOLE BLOOD: 1.1 MMOL/L (ref 0.7–2.1)
MCH RBC QN AUTO: 27.3 PG (ref 25.2–33.5)
MCHC RBC AUTO-ENTMCNC: 31 G/DL (ref 28.4–34.8)
MCV RBC AUTO: 88.1 FL (ref 82.6–102.9)
MICROORGANISM SPEC CULT: NO GROWTH
NRBC BLD-RTO: 0 PER 100 WBC
PARTIAL THROMBOPLASTIN TIME: 109.8 SEC (ref 23–36.5)
PARTIAL THROMBOPLASTIN TIME: 37.6 SEC (ref 23–36.5)
PARTIAL THROMBOPLASTIN TIME: 98.8 SEC (ref 23–36.5)
PLATELET # BLD AUTO: 326 K/UL (ref 138–453)
PMV BLD AUTO: 9.9 FL (ref 8.1–13.5)
POTASSIUM SERPL-SCNC: 4.4 MMOL/L (ref 3.7–5.3)
PROTHROMBIN TIME: 21.7 SEC (ref 11.7–14.9)
PROTHROMBIN TIME: 24.6 SEC (ref 11.7–14.9)
PSA SERPL-MCNC: 12.66 NG/ML
RBC # BLD AUTO: 2.86 M/UL (ref 4.21–5.77)
SODIUM SERPL-SCNC: 138 MMOL/L (ref 136–145)
SPECIMEN DESCRIPTION: NORMAL
TIBC SERPL-MCNC: 169 UG/DL (ref 250–450)
TROPONIN I SERPL HS-MCNC: 37 NG/L (ref 0–22)
TSH SERPL DL<=0.05 MIU/L-ACNC: 3.9 UIU/ML (ref 0.3–5)
UNSATURATED IRON BINDING CAPACITY: 127 UG/DL (ref 112–347)
WBC OTHER # BLD: 11.8 K/UL (ref 3.5–11.3)

## 2024-01-20 PROCEDURE — G0103 PSA SCREENING: HCPCS

## 2024-01-20 PROCEDURE — 6370000000 HC RX 637 (ALT 250 FOR IP): Performed by: INTERNAL MEDICINE

## 2024-01-20 PROCEDURE — 6360000002 HC RX W HCPCS: Performed by: INTERNAL MEDICINE

## 2024-01-20 PROCEDURE — 80048 BASIC METABOLIC PNL TOTAL CA: CPT

## 2024-01-20 PROCEDURE — 82947 ASSAY GLUCOSE BLOOD QUANT: CPT

## 2024-01-20 PROCEDURE — 36415 COLL VENOUS BLD VENIPUNCTURE: CPT

## 2024-01-20 PROCEDURE — 6370000000 HC RX 637 (ALT 250 FOR IP): Performed by: NURSE PRACTITIONER

## 2024-01-20 PROCEDURE — 85730 THROMBOPLASTIN TIME PARTIAL: CPT

## 2024-01-20 PROCEDURE — 83605 ASSAY OF LACTIC ACID: CPT

## 2024-01-20 PROCEDURE — 85610 PROTHROMBIN TIME: CPT

## 2024-01-20 PROCEDURE — 97162 PT EVAL MOD COMPLEX 30 MIN: CPT

## 2024-01-20 PROCEDURE — 85027 COMPLETE CBC AUTOMATED: CPT

## 2024-01-20 PROCEDURE — 85520 HEPARIN ASSAY: CPT

## 2024-01-20 PROCEDURE — 1200000000 HC SEMI PRIVATE

## 2024-01-20 PROCEDURE — P9041 ALBUMIN (HUMAN),5%, 50ML: HCPCS | Performed by: INTERNAL MEDICINE

## 2024-01-20 PROCEDURE — 82728 ASSAY OF FERRITIN: CPT

## 2024-01-20 PROCEDURE — 2580000003 HC RX 258: Performed by: INTERNAL MEDICINE

## 2024-01-20 PROCEDURE — 84484 ASSAY OF TROPONIN QUANT: CPT

## 2024-01-20 PROCEDURE — 83550 IRON BINDING TEST: CPT

## 2024-01-20 PROCEDURE — 85379 FIBRIN DEGRADATION QUANT: CPT

## 2024-01-20 PROCEDURE — 99232 SBSQ HOSP IP/OBS MODERATE 35: CPT | Performed by: INTERNAL MEDICINE

## 2024-01-20 PROCEDURE — 83540 ASSAY OF IRON: CPT

## 2024-01-20 PROCEDURE — 86140 C-REACTIVE PROTEIN: CPT

## 2024-01-20 PROCEDURE — 2580000003 HC RX 258: Performed by: NURSE PRACTITIONER

## 2024-01-20 PROCEDURE — A4216 STERILE WATER/SALINE, 10 ML: HCPCS | Performed by: INTERNAL MEDICINE

## 2024-01-20 PROCEDURE — C9113 INJ PANTOPRAZOLE SODIUM, VIA: HCPCS | Performed by: INTERNAL MEDICINE

## 2024-01-20 PROCEDURE — 84443 ASSAY THYROID STIM HORMONE: CPT

## 2024-01-20 PROCEDURE — 97530 THERAPEUTIC ACTIVITIES: CPT

## 2024-01-20 RX ORDER — CALCIUM CARBONATE 500 MG/1
1000 TABLET, CHEWABLE ORAL ONCE
Status: COMPLETED | OUTPATIENT
Start: 2024-01-20 | End: 2024-01-20

## 2024-01-20 RX ORDER — ATORVASTATIN CALCIUM 40 MG/1
40 TABLET, FILM COATED ORAL NIGHTLY
Status: DISCONTINUED | OUTPATIENT
Start: 2024-01-20 | End: 2024-01-23 | Stop reason: HOSPADM

## 2024-01-20 RX ORDER — ACETAMINOPHEN 325 MG/1
650 TABLET ORAL EVERY 6 HOURS PRN
Status: DISCONTINUED | OUTPATIENT
Start: 2024-01-20 | End: 2024-01-23 | Stop reason: HOSPADM

## 2024-01-20 RX ORDER — HEPARIN SODIUM 10000 [USP'U]/100ML
5-30 INJECTION, SOLUTION INTRAVENOUS CONTINUOUS
Status: DISCONTINUED | OUTPATIENT
Start: 2024-01-20 | End: 2024-01-21

## 2024-01-20 RX ORDER — SODIUM CHLORIDE 9 MG/ML
INJECTION, SOLUTION INTRAVENOUS CONTINUOUS
Status: DISCONTINUED | OUTPATIENT
Start: 2024-01-20 | End: 2024-01-21

## 2024-01-20 RX ORDER — SODIUM CHLORIDE 9 MG/ML
INJECTION, SOLUTION INTRAVENOUS PRN
Status: DISCONTINUED | OUTPATIENT
Start: 2024-01-20 | End: 2024-01-23 | Stop reason: HOSPADM

## 2024-01-20 RX ORDER — AMOXICILLIN 250 MG
1 CAPSULE ORAL DAILY
Status: DISCONTINUED | OUTPATIENT
Start: 2024-01-20 | End: 2024-01-23 | Stop reason: HOSPADM

## 2024-01-20 RX ORDER — FUROSEMIDE 10 MG/ML
20 INJECTION INTRAMUSCULAR; INTRAVENOUS SEE ADMIN INSTRUCTIONS
Status: DISCONTINUED | OUTPATIENT
Start: 2024-01-20 | End: 2024-01-23 | Stop reason: HOSPADM

## 2024-01-20 RX ORDER — MIDODRINE HYDROCHLORIDE 5 MG/1
10 TABLET ORAL PRN
Status: DISCONTINUED | OUTPATIENT
Start: 2024-01-20 | End: 2024-01-23 | Stop reason: HOSPADM

## 2024-01-20 RX ORDER — CARVEDILOL 6.25 MG/1
6.25 TABLET ORAL 2 TIMES DAILY WITH MEALS
Status: DISCONTINUED | OUTPATIENT
Start: 2024-01-20 | End: 2024-01-23 | Stop reason: HOSPADM

## 2024-01-20 RX ORDER — ONDANSETRON 2 MG/ML
4 INJECTION INTRAMUSCULAR; INTRAVENOUS EVERY 6 HOURS PRN
Status: DISCONTINUED | OUTPATIENT
Start: 2024-01-20 | End: 2024-01-23 | Stop reason: HOSPADM

## 2024-01-20 RX ORDER — ALBUMIN, HUMAN INJ 5% 5 %
25 SOLUTION INTRAVENOUS ONCE
Status: COMPLETED | OUTPATIENT
Start: 2024-01-20 | End: 2024-01-20

## 2024-01-20 RX ORDER — HEPARIN SODIUM 1000 [USP'U]/ML
80 INJECTION, SOLUTION INTRAVENOUS; SUBCUTANEOUS ONCE
Status: DISCONTINUED | OUTPATIENT
Start: 2024-01-20 | End: 2024-01-20

## 2024-01-20 RX ORDER — HEPARIN SODIUM 1000 [USP'U]/ML
10000 INJECTION, SOLUTION INTRAVENOUS; SUBCUTANEOUS PRN
Status: DISCONTINUED | OUTPATIENT
Start: 2024-01-20 | End: 2024-01-23 | Stop reason: HOSPADM

## 2024-01-20 RX ORDER — ONDANSETRON 4 MG/1
4 TABLET, ORALLY DISINTEGRATING ORAL EVERY 8 HOURS PRN
Status: DISCONTINUED | OUTPATIENT
Start: 2024-01-20 | End: 2024-01-23 | Stop reason: HOSPADM

## 2024-01-20 RX ORDER — HEPARIN SODIUM 1000 [USP'U]/ML
5000 INJECTION, SOLUTION INTRAVENOUS; SUBCUTANEOUS PRN
Status: DISCONTINUED | OUTPATIENT
Start: 2024-01-20 | End: 2024-01-23 | Stop reason: HOSPADM

## 2024-01-20 RX ORDER — POLYETHYLENE GLYCOL 3350 17 G/17G
17 POWDER, FOR SOLUTION ORAL DAILY PRN
Status: DISCONTINUED | OUTPATIENT
Start: 2024-01-20 | End: 2024-01-23 | Stop reason: HOSPADM

## 2024-01-20 RX ORDER — SODIUM CHLORIDE 0.9 % (FLUSH) 0.9 %
10 SYRINGE (ML) INJECTION PRN
Status: DISCONTINUED | OUTPATIENT
Start: 2024-01-20 | End: 2024-01-23 | Stop reason: HOSPADM

## 2024-01-20 RX ORDER — ACETAMINOPHEN 650 MG/1
650 SUPPOSITORY RECTAL EVERY 6 HOURS PRN
Status: DISCONTINUED | OUTPATIENT
Start: 2024-01-20 | End: 2024-01-23 | Stop reason: HOSPADM

## 2024-01-20 RX ORDER — SODIUM CHLORIDE 0.9 % (FLUSH) 0.9 %
5-40 SYRINGE (ML) INJECTION EVERY 12 HOURS SCHEDULED
Status: DISCONTINUED | OUTPATIENT
Start: 2024-01-20 | End: 2024-01-23 | Stop reason: HOSPADM

## 2024-01-20 RX ORDER — PANTOPRAZOLE SODIUM 40 MG/1
40 TABLET, DELAYED RELEASE ORAL
Status: DISCONTINUED | OUTPATIENT
Start: 2024-01-20 | End: 2024-01-20

## 2024-01-20 RX ADMIN — SODIUM CHLORIDE: 9 INJECTION, SOLUTION INTRAVENOUS at 00:21

## 2024-01-20 RX ADMIN — ACETAMINOPHEN 650 MG: 325 TABLET ORAL at 06:23

## 2024-01-20 RX ADMIN — TAMSULOSIN HYDROCHLORIDE 0.4 MG: 0.4 CAPSULE ORAL at 08:08

## 2024-01-20 RX ADMIN — ATORVASTATIN CALCIUM 40 MG: 40 TABLET, FILM COATED ORAL at 00:38

## 2024-01-20 RX ADMIN — DOCUSATE SODIUM 50 MG AND SENNOSIDES 8.6 MG 1 TABLET: 8.6; 5 TABLET, FILM COATED ORAL at 04:16

## 2024-01-20 RX ADMIN — APIXABAN 5 MG: 5 TABLET, FILM COATED ORAL at 00:38

## 2024-01-20 RX ADMIN — PIPERACILLIN AND TAZOBACTAM 3375 MG: 3; .375 INJECTION, POWDER, FOR SOLUTION INTRAVENOUS at 04:16

## 2024-01-20 RX ADMIN — ATORVASTATIN CALCIUM 40 MG: 40 TABLET, FILM COATED ORAL at 22:37

## 2024-01-20 RX ADMIN — PANTOPRAZOLE SODIUM 40 MG: 40 INJECTION, POWDER, FOR SOLUTION INTRAVENOUS at 08:08

## 2024-01-20 RX ADMIN — SODIUM CHLORIDE: 9 INJECTION, SOLUTION INTRAVENOUS at 00:34

## 2024-01-20 RX ADMIN — Medication 1000 MG: at 01:35

## 2024-01-20 RX ADMIN — SODIUM CHLORIDE, PRESERVATIVE FREE 10 ML: 5 INJECTION INTRAVENOUS at 08:09

## 2024-01-20 RX ADMIN — HEPARIN SODIUM AND DEXTROSE 14 UNITS/KG/HR: 10000; 5 INJECTION INTRAVENOUS at 09:58

## 2024-01-20 RX ADMIN — POLYETHYLENE GLYCOL 3350 17 G: 17 POWDER, FOR SOLUTION ORAL at 22:37

## 2024-01-20 RX ADMIN — ALBUMIN (HUMAN) 25 G: 12.5 INJECTION, SOLUTION INTRAVENOUS at 04:55

## 2024-01-20 RX ADMIN — ANTACID TABLETS 1000 MG: 500 TABLET, CHEWABLE ORAL at 11:47

## 2024-01-20 ASSESSMENT — PAIN DESCRIPTION - DESCRIPTORS: DESCRIPTORS: THROBBING;ACHING

## 2024-01-20 ASSESSMENT — PAIN DESCRIPTION - ORIENTATION: ORIENTATION: RIGHT

## 2024-01-20 ASSESSMENT — PAIN SCALES - GENERAL: PAINLEVEL_OUTOF10: 8

## 2024-01-20 ASSESSMENT — PAIN DESCRIPTION - LOCATION: LOCATION: KNEE

## 2024-01-20 NOTE — CARE COORDINATION
Case Management Assessment  Initial Evaluation    Date/Time of Evaluation: 1/20/2024 12:39 PM  Assessment Completed by: Helen Hidalgo RN    If patient is discharged prior to next notation, then this note serves as note for discharge by case management.    Patient Name: Jose Maria Ge                   YOB: 1955  Diagnosis: CHRIS (acute kidney injury) (HCC) [N17.9]  Urinary tract infection associated with indwelling urethral catheter, subsequent encounter [T83.511D, N39.0]  Nausea and vomiting, unspecified vomiting type [R11.2]                   Date / Time: 1/19/2024  4:44 PM    Patient Admission Status: Inpatient   Readmission Risk (Low < 19, Mod (19-27), High > 27): Readmission Risk Score: 25.2    Current PCP: No primary care provider on file.  PCP verified by CM? (P) Yes    Chart Reviewed: Yes      History Provided by: (P) Patient  Patient Orientation: (P) Alert and Oriented    Patient Cognition: (P) Alert    Hospitalization in the last 30 days (Readmission):  No    If yes, Readmission Assessment in CM Navigator will be completed.    Advance Directives:      Code Status: Full Code   Patient's Primary Decision Maker is: (P) Legal Next of Kin    Primary Decision Maker: JooMatt - Brother/Sister - [phone]    Secondary Decision Maker: Nirmala Sun - Brother/Sister - [phone]    Supplemental (Other) Decision Maker: JooJes - Other Relative - [phone]    Discharge Planning:    Patient lives with: (P) Alone Type of Home: (P) Skilled Nursing Facility  Primary Care Giver: (P) Self  Patient Support Systems include: (P) Family Members, Friends/Neighbors   Current Financial resources:    Current community resources:    Current services prior to admission: (P) Skilled Nursing Facility            Current DME:              Type of Home Care services:  (P) None    ADLS  Prior functional level: (P) Independent in ADLs/IADLs  Current functional level: (P) Independent in ADLs/IADLs    PT AM-PAC:

## 2024-01-20 NOTE — PLAN OF CARE
Problem: Safety - Adult  Goal: Free from fall injury  1/20/2024 1559 by Leila Stewart, RN  Outcome: Progressing  1/20/2024 0208 by Tessy Wood, RN  Outcome: Progressing

## 2024-01-20 NOTE — ED NOTES
Pt arrived with c/o abd pain, N/V, pt has a keys placed previously. Pinkish output noted to keys.   Pt A&Ox4 on arrival and answering all questions appropriate.   Patient was reported to also have low hemoglobin level

## 2024-01-20 NOTE — PLAN OF CARE
Problem: Discharge Planning  Goal: Discharge to home or other facility with appropriate resources  Outcome: Progressing     Problem: Skin/Tissue Integrity  Goal: Absence of new skin breakdown  Description: 1.  Monitor for areas of redness and/or skin breakdown  2.  Assess vascular access sites hourly  3.  Every 4-6 hours minimum:  Change oxygen saturation probe site  4.  Every 4-6 hours:  If on nasal continuous positive airway pressure, respiratory therapy assess nares and determine need for appliance change or resting period.  Outcome: Progressing     Problem: Safety - Adult  Goal: Free from fall injury  Outcome: Progressing     Problem: ABCDS Injury Assessment  Goal: Absence of physical injury  Outcome: Progressing

## 2024-01-20 NOTE — CARE COORDINATION
Case Management Assessment  Initial Evaluation    Date/Time of Evaluation: 1/20/2024 12:33 PM  Assessment Completed by: Helen Hidalgo RN    If patient is discharged prior to next notation, then this note serves as note for discharge by case management.    Patient Name: Jose Maria Ge                   YOB: 1955  Diagnosis: CHRIS (acute kidney injury) (HCC) [N17.9]  Urinary tract infection associated with indwelling urethral catheter, subsequent encounter [T83.511D, N39.0]  Nausea and vomiting, unspecified vomiting type [R11.2]                   Date / Time: 1/19/2024  4:44 PM    Patient Admission Status: Inpatient   Readmission Risk (Low < 19, Mod (19-27), High > 27): Readmission Risk Score: 25.2    Current PCP: No primary care provider on file.  PCP verified by CM? (P) Yes    Chart Reviewed: Yes      History Provided by: (P) Patient  Patient Orientation: (P) Alert and Oriented    Patient Cognition: (P) Alert    Hospitalization in the last 30 days (Readmission):  No    If yes, Readmission Assessment in CM Navigator will be completed.    Advance Directives:      Code Status: Full Code   Patient's Primary Decision Maker is: (P) Legal Next of Kin    Primary Decision Maker: JooMatt - Brother/Sister - [phone]    Secondary Decision Maker: Nirmala Sun - Brother/Sister - [phone]    Supplemental (Other) Decision Maker: JooJes - Other Relative - [phone]    Discharge Planning:    Patient lives with: (P) Alone in apartment Type of Home:came from (P) Skilled Nursing Facility  Primary Care Giver: (P) Self  Patient Support Systems include: (P) Family Members, Friends/Neighbors   Current Financial resources:    Current community resources:    Current services prior to admission: (P) Skilled Nursing Facility            Current DME:              Type of Home Care services:  (P) None    ADLS  Prior functional level: (P) Independent in ADLs/IADLs  Current functional level: (P) Independent in

## 2024-01-20 NOTE — PROGRESS NOTES
Physical Therapy  Facility/Department: [facility] RENAL//MED SURG  Physical Therapy Initial Assessment    Name: Jose Maria Ge  : 1955  MRN: [id number]  Date of Service: 2024  Chief Complaint   Patient presents with    Abdominal Pain       Discharge Recommendations:   Further therapy recommended at discharge.     PT Equipment Recommendations  Equipment Needed: No      Patient Diagnosis(es): The primary encounter diagnosis was CHRIS (acute kidney injury) (HCC). Diagnoses of Nausea and vomiting, unspecified vomiting type and Urinary tract infection associated with indwelling urethral catheter, subsequent encounter were also pertinent to this visit.  Past Medical History:  has no past medical history on file.  Past Surgical History:  has a past surgical history that includes vascular surgery (Right, 2024).    Assessment   Body Structures, Functions, Activity Limitations Requiring Skilled Therapeutic Intervention: Decreased functional mobility ;Decreased strength;Decreased endurance;Decreased balance  Assessment: Pt amb1' R lateral Rani bariatric RW. Pt reports dizziness and fatigue limiting ambulation distance at this time. Pt reports being IND PLOF. Pt is currently unsafe to return to prior living arraingements. Pt would benefit from continued acute PT to address deficits.  Therapy Prognosis: Good  Decision Making: Medium Complexity  Requires PT Follow-Up: Yes  Activity Tolerance  Activity Tolerance: Patient limited by fatigue;Patient limited by endurance;Other (comment)  Activity Tolerance Comments: Pt reports beign limited by fatigue, dizziness in standing.     Plan   Physical Therapy Plan  General Plan:  (5-6x/wk)  Current Treatment Recommendations: Strengthening, Balance training, Functional mobility training, Transfer training, Endurance training, Gait training, Stair training, Neuromuscular re-education, Home exercise program, Safety education & training, Patient/Caregiver education & training, Equipment

## 2024-01-20 NOTE — CONSULTS
Darrin Rizvi, Mario, Zeny, Nani, Herve, Haroon, & Trey  Urology Consultation      Patient:  Jose Maria Ge  MRN: [id number]  YOB: 1955    REASON FOR CONSULT: Malpositioned Staley catheter, CHRIS, severely distended bladder    HISTORY OF PRESENT ILLNESS:   The patient is a 68 y.o. male who presents with worsening abdominal pain.  CT scan was obtained in the emergency department which showed severely distended bladder with hydro hydronephrosis bilaterally and malpositioned Staley catheter.  Tip of catheter seem to be in prostatic urethra.  Urology consulted for these findings.    Patient was question examined at the bedside.  Patient was recently seen by urology service on recent admission.  Due to high volume retention, we were required to insert a Staley catheter cystoscopically on 1/9/2024.  During recent admission patient did have some gross hematuria which required continuous bladder irrigation.  Patient was then recently discharged Staley catheter in place.  Plan was for outpatient follow-up.  Patient states that he started developing worsening abdominal pain over the last day or so.  Complains of some issues of leakage around catheter.  Presented to the hospital due to these findings.    Patient is afebrile and vital signs are stable.  Lab work reveals: WBC 14.6, creatinine 3.9 (baseline 0.9), UA with large leukocyte esterase and positive nitrates.  Urine culture is pending.  CT abdomen pelvis showing markedly distended bladder with prostatomegaly hydroureteronephrosis.  Malpositioned Staley catheter with tip noted within the prostatic urethra.    Patient's Staley catheter balloon was deflated, and catheter was then advanced into the bladder.  There was immediate return of greater than 1 L of urine output.    Past Medical History:    History reviewed. No pertinent past medical history.    Past Surgical History:    Past Surgical History:   Procedure Laterality Date    VASCULAR SURGERY Right  development of markedly distended bladder with prostatomegaly. Malpositioned Keys catheter is noted with tip and balloon within the distal ureter.  Additionally, there is resultant bilateral hydroureteronephrosis, likely related to bladder outlet obstruction.     XR CHEST PORTABLE    Result Date: 1/19/2024  EXAMINATION: ONE XRAY VIEW OF THE CHEST 1/19/2024 5:32 pm COMPARISON: 01/09/2024 HISTORY: ORDERING SYSTEM PROVIDED HISTORY: Sepsis TECHNOLOGIST PROVIDED HISTORY: Sepsis FINDINGS: The heart is normal in size.  There is no focal pulmonary consolidation.  No definite pleural effusion or pneumothorax identified.  The visualized bones are unremarkable.     No acute cardiopulmonary abnormality is identified.       Assessment and Plan   Impression:   Prostatomegaly -seen on CT abdomen pelvis  History of severe high-volume urinary retention  Malpositioned Keys catheter -Keys catheter tip in prostatic urethra.  Keys catheter was advanced in the bladder with immediate return of high urine volume  Bilateral hydroureteronephrosis -likely secondary to severely distended bladder  Postrenal CHRIS -creatinine 3.9 on arrival, baseline 0.9.  Due to severely distended bladder and postrenal obstructive uropathy.    Plan:   Please maintain Keys catheter for maximal decompression of bladder  Will start Flomax 0.4 mg daily  Trend creatinine  Follow up urine culture  Agree with antibiotics  Tailor antibiotics to final urine culture and sensitivities  Patient will need keys on discharge  Patient will require further outpatient workup for prostatomegaly and discussion of possible surgical interventions and urodynamics      Giovani Villalba MD  Urology Resident, PGY-2

## 2024-01-20 NOTE — CARE COORDINATION
Call placed to [facility], inquired if pt can return ,await call back  5:00 Received call from Shama from [facility], pt can return but does need re precert. Please call her with anticipated discharge date and when to start re precert

## 2024-01-20 NOTE — ED NOTES
ED to inpatient nurses report      Chief Complaint:  Chief Complaint   Patient presents with    Abdominal Pain     Present to ED from: home    MOA:     LOC: alert and orientated to name, place, date  Mobility: Requires assistance * 1  Oxygen Baseline: RA    Current needs required: RA   Pending ED orders: none  Present condition: stable    Why did the patient come to the ED?     Pt arrived with c/o abd pain, N/V, pt has a keys placed previously. Pinkish output noted to keys.   Pt A&Ox4 on arrival and answering all questions appropriate.   Patient was reported to also have low hemoglobin level     What is the plan? Urology   Any procedures or intervention occur? Fluids, labs, CT abdomen, septic workup  Any safety concerns??    Mental Status:  Level of Consciousness: Alert (0)    Psych Assessment:   Psychosocial  Psychosocial (WDL): Within Defined Limits  Vital signs   Vitals:    01/19/24 1701 01/19/24 2022   BP: (!) 144/95 114/72   Pulse: 95 87   Resp: 20 16   Temp: (!) 96.4 °F (35.8 °C) 97.5 °F (36.4 °C)   TempSrc: Oral Oral   SpO2: 100% 100%        Vitals:  Patient Vitals for the past 24 hrs:   BP Temp Temp src Pulse Resp SpO2   01/19/24 2022 114/72 97.5 °F (36.4 °C) Oral 87 16 100 %   01/19/24 1701 (!) 144/95 (!) 96.4 °F (35.8 °C) Oral 95 20 100 %      Visit Vitals  /72   Pulse 87   Temp 97.5 °F (36.4 °C) (Oral)   Resp 16   SpO2 100%        LDAs:   Peripheral IV 01/19/24 Left Antecubital (Active)   Site Assessment Clean, dry & intact 01/19/24 2003   Line Status Blood return noted;Normal saline locked;Specimen collected 01/19/24 2003   Phlebitis Assessment No symptoms 01/19/24 2003   Infiltration Assessment 0 01/19/24 2003       Ambulatory Status:  Presents to emergency department  because of falls (Syncope, seizure, or loss of consciousness): No, Age > 70: No, Altered Mental Status, Intoxication with alcohol or substance confusion (Disorientation, impaired judgment, poor safety awaremess, or inability to    CULTURE, URINE   LACTIC ACID   LIPASE   MICROSCOPIC URINALYSIS   CREATININE, RANDOM URINE   SODIUM, URINE, RANDOM       Electronically signed by Joanna Shen RN on 1/19/2024 at 10:09 PM

## 2024-01-20 NOTE — PROGRESS NOTES
[facility]  Office: [phone]  Lorenzo Sosa DO, Tanner Angulo DO, Quintin Donnelly DO, Mikael Tomas DO, Kale Garcia MD, Jennie Guerrier MD, Marie Abdi MD, Radha Shah MD,  Bernard Corrales MD, Ulices Moura MD, Debra Burns MD,  Papa Hogue DO, Abbi Stevens MD, Darion Dumont MD, Todd Sosa DO, Bre Carlos MD,  William Billingsley DO, Alessandra Bernardo MD, Jamia Bay MD, Cyndi Olivia MD, Dom Ruiz MD,  Wilbert Velasquez MD, Mariama Richards MD, Annita Yao MD, Michelle Ortiz MD, Aguilar Vigil MD, Bishop Mcgee MD, Luis Cook DO, Getachew Oseguera DO, Luna Steward MD,  Jeremiah Charlton MD, Shirley Waterhouse, CNP,  Twyla Elena CNP, Tico Taylor, CNP,  Sara Marinelli, STEVEN, Suzette Liz, CNP, Bonnie Bruno CNP, Marjorie Samaniego CNP, Izabela Aylaa CNP, Meghann Olivia CNP, Viviane Mcpherson, PA-C, Kanwal Nichols PA-C, Mitzi Quintana, CNP, Amina Meyers, CNS, Jacquie Erickson, CNP, Arlette Castellano CNP, Tracy Schwab, CNP         IN-PATIENT SERVICE  [facility]    Progress Note    1/20/2024    10:45 AM    Name:   Jose Maria Ge  MRN:     [id number]     Acct:      [id number]   Room:   [address] [place], [place] [address] Day:  1  Admit Date:  1/19/2024  4:44 PM    PCP:   No primary care provider on file.  Code Status:  Full Code    Subjective:     C/C:   Chief Complaint   Patient presents with    Abdominal Pain     Interval History Status:   Patient much more comfortable after Staley catheter placement and bladder decompression  Resting comfortably  No fever, chills, sweats    Data Base Updates:  WBC11.8 High k/uL RBC2.86 Low m/uL Hemoglobin7.8 Low     INR2.3    Troponin, High [id number] High         Calcium7.3 Low     [id number] High mg/dL     BUN31 High mg/dL Creatinine3.7 High         UA  Specimen Source: Urine Color, UARed Abnormal    Comment: INTERPRET WITH CAUTION DUE TO INTENSE COLOR OF URINE.   Turbidity UATurbid Abnormal  Glucose, UrNEGATIVEmg/dL Bilirubin  secondary to malpositioning of keys with obstruction post renal -  -CT abdomen pelvis showing markedly distended bladder with prostatomegaly hydroureteronephrosis.  Malpositioned Keys catheter with tip noted within the prostatic urethra.  -intermittent hematuria felipe. Possible clot with obstruction?  -keys placemnt   -treat any underlying uti  -trend renal function and output      #post renal CHRIS  -Cr 3.9, bun 30. 1 month ago was nml.   -recently on levoquin for uti  -etiology prerenal obstruction  -relieve obstruction, IVF, repeat Cr and trend  -renal dose all meds, monitog volume status  +3 pitting edema, provide albumin, caution with ivf  -change eliquis to heparin if no bleeding concern      #complicated UTI/pylonephritis   -wbc 14K  -hx of uti with klebsiella and other gram neg, in past sensitive to ceftriaxone  -UA,UC, replace stent, IV ceftriaxone, control clucose  -empeirc abx  -evaluate for pylo     #Submassive Pe on eliquis  -recently Dx and on eliquis  -PLT stable, Hgb near baseline.   -reported felipe hematuria intermittently  -switch from eliquis to heparin for reversibility [][]  -dimer     #Pulmonary Hypertesnion  -Echo showing high rvsp. Tricuspid regurge. See echo for details  -on room air, CXR clear. Denies sob     #anemia  -hgb 8.3, , on elqios  -INR wnml range   -repored abdominal pain with deep palpation of abdomen  -hold or change eliquis. Dimer and trop to evaluate if ongoing PE.     #1st dgress AV block  -similar to previously. On coreg at home.   -coreg held as BP soft  -HR in 80s sinus  -recent echo ef wnml \"         Review of Systems:     Review of Systems   Constitutional:  Positive for activity change, appetite change (Decreased) and fatigue. Negative for chills, diaphoresis and fever.   Respiratory:  Positive for shortness of breath (Dyspnea on exertion). Negative for cough.    Cardiovascular:  Positive for leg swelling. Negative for chest pain and palpitations.

## 2024-01-20 NOTE — H&P
[facility]  Office: [phone]  Lorenzo Sosa DO, Tanner Angulo DO, Quintin Donnelly DO, Mikael Tomas DO, Kale Garcia MD, Jennie Guerrier MD, Marie Abdi MD, Radha Shah MD,  Bernard Corrales MD, Ulices Moura MD, Debra Burns MD,  Papa Hogue DO, Abbi Stevens MD, Darion Dumont MD, Todd Sosa DO, Bre Carlos MD,  William Billingsley DO, Alessandra Bernardo MD, Jamia Bay MD, Cyndi Olivia MD, Dom Ruiz MD,  Wilbert Velasquez MD, Mariama Richards MD, Annita Yao MD, Michelle Ortiz MD, Aguilar Vigil MD, Bishop Mcgee MD, Luis Cook DO, Getachew Oseguera DO, Luna Steward MD,  Jeremiah Charlton MD, Shirley Waterhouse, CNP,  Twyla Elena, CNP, Tcio Taylor, CNP,  Sara Marinelli, STEVEN, Suzette Liz, CNP, Bonnie Bruno, CNP, Marjorie Samaniego CNP, Izabela Ayala, CNP, Meghann Olivia, CNP, Viviane Mcpherson, PA-C, Kanwal Nichols PA-C, Mitzi Quintana, CNP, Amina Meyers, CNS, Jacquie Erickson, CNP, Arlette Castellano, CNP, Tracy Schwab, CNP         [facility]   IN-PATIENT SERVICE   [facility]    HISTORY AND PHYSICAL EXAMINATION            Date:   1/19/2024  Patient name:  Jose Maria Ge  Date of admission:  1/19/2024  4:44 PM  MRN:   [id number]  Account:  [id number]  YOB: 1955  PCP:    No primary care provider on file.  Room:   17/17  Code Status:    Prior    Chief Complaint:     Chief Complaint   Patient presents with    Abdominal Pain   Pelvis pain, epigastric pain, constipation, keys pain , fever and chills     History of Present Illness:     Jose Maria Ge is a 68 y.Kelly Ge is a 68 y.o. male past medical history of recent submassive PE, history of DVTs, history of urinary retention, history of BPH, history of hypertension who presents with abdominal pain with nausea/vomiting . Last BM 5 days ago.   Recent thrombectomy, BLT pylo, septicemia with klebsiella. He reports intermittent chills, fevers. He reports epistaxis and hematuria

## 2024-01-20 NOTE — PROGRESS NOTES
Trey Rizvi Santacroce, Khan, Mostafa, Herve Martin Jr  Urology Progress Note     Subjective:   Staley catheter has been draining well with clear yellow urine  Patient has been afebrile with vital signs stable  Urine output 2500 cc  Creatinine slightly trended down to 3.7  Patient denies any abdominal distention or discomfort and states that he feels generally a lot better      Patient Vitals for the past 24 hrs:   BP Temp Temp src Pulse Resp SpO2 Height Weight   01/20/24 0735 108/66 97.9 °F (36.6 °C) Oral 83 18 100 % -- --   01/20/24 0430 114/62 98.4 °F (36.9 °C) Oral 84 16 100 % -- --   01/20/24 0030 -- -- -- -- -- -- 1.905 m (6' 3\") (!) 140.2 kg (309 lb 1.4 oz)   01/20/24 0015 (!) 96/54 98.4 °F (36.9 °C) Oral 96 16 100 % -- --   01/19/24 2245 131/77 -- -- -- -- 100 % -- --   01/19/24 2211 -- -- -- -- -- 98 % -- --   01/19/24 2200 136/72 -- -- -- -- 99 % -- --   01/19/24 2022 114/72 97.5 °F (36.4 °C) Oral 87 16 100 % -- --   01/19/24 1701 (!) 144/95 (!) 96.4 °F (35.8 °C) Oral 95 20 100 % -- --       Intake/Output Summary (Last 24 hours) at 1/20/2024 0849  Last data filed at 1/20/2024 0627  Gross per 24 hour   Intake --   Output 2540 ml   Net -2540 ml       Recent Labs     01/19/24 1803 01/20/24 0413   WBC 14.6* 11.8*   HGB 8.3* 7.8*   HCT 27.6* 25.2*   MCV 90.5 88.1    326     Recent Labs     01/19/24 1803 01/20/24 0413   * 138   K 4.7 4.4   CL 99 104   CO2 20 23   BUN 30* 31*   CREATININE 3.9* 3.7*       Recent Labs     01/19/24  1820   COLORU Red*   PHUR 7.5   WBCUA TOO NUMEROUS TO COUNT   RBCUA TOO NUMEROUS TO COUNT   BACTERIA None   SPECGRAV 1.023   LEUKOCYTESUR LARGE*   UROBILINOGEN Normal   BILIRUBINUR NEGATIVE  Verified by ictotest.*       Additional Lab/culture results: None    Physical Exam:   AAOx3  NAD  Unlabored breathing  Normal rate  Abdomen soft, appropriately tender to palpation, nondistended  : Staley in place draining clear yellow urine  No calf tenderness to palpation

## 2024-01-21 LAB
ANION GAP SERPL CALCULATED.3IONS-SCNC: 8 MMOL/L (ref 9–17)
BUN SERPL-MCNC: 28 MG/DL (ref 8–23)
CA-I BLD-SCNC: 1.03 MMOL/L (ref 1.13–1.33)
CALCIUM SERPL-MCNC: 7.4 MG/DL (ref 8.6–10.4)
CHLORIDE SERPL-SCNC: 102 MMOL/L (ref 98–107)
CO2 SERPL-SCNC: 24 MMOL/L (ref 20–31)
CREAT SERPL-MCNC: 2.6 MG/DL (ref 0.7–1.2)
ERYTHROCYTE [DISTWIDTH] IN BLOOD BY AUTOMATED COUNT: 14.7 % (ref 11.8–14.4)
GFR SERPL CREATININE-BSD FRML MDRD: 26 ML/MIN/1.73M2
GLUCOSE BLD-MCNC: 107 MG/DL (ref 75–110)
GLUCOSE BLD-MCNC: 86 MG/DL (ref 75–110)
GLUCOSE BLD-MCNC: 95 MG/DL (ref 75–110)
GLUCOSE SERPL-MCNC: 100 MG/DL (ref 70–99)
HCT VFR BLD AUTO: 24.5 % (ref 40.7–50.3)
HGB BLD-MCNC: 7.4 G/DL (ref 13–17)
MCH RBC QN AUTO: 27 PG (ref 25.2–33.5)
MCHC RBC AUTO-ENTMCNC: 30.2 G/DL (ref 28.4–34.8)
MCV RBC AUTO: 89.4 FL (ref 82.6–102.9)
NRBC BLD-RTO: 0 PER 100 WBC
PARTIAL THROMBOPLASTIN TIME: 81.4 SEC (ref 23–36.5)
PLATELET # BLD AUTO: 348 K/UL (ref 138–453)
PMV BLD AUTO: 9.9 FL (ref 8.1–13.5)
POTASSIUM SERPL-SCNC: 4.2 MMOL/L (ref 3.7–5.3)
RBC # BLD AUTO: 2.74 M/UL (ref 4.21–5.77)
SODIUM SERPL-SCNC: 134 MMOL/L (ref 135–144)
WBC OTHER # BLD: 11.8 K/UL (ref 3.5–11.3)

## 2024-01-21 PROCEDURE — 1200000000 HC SEMI PRIVATE

## 2024-01-21 PROCEDURE — 6370000000 HC RX 637 (ALT 250 FOR IP): Performed by: NURSE PRACTITIONER

## 2024-01-21 PROCEDURE — 6370000000 HC RX 637 (ALT 250 FOR IP): Performed by: INTERNAL MEDICINE

## 2024-01-21 PROCEDURE — 36415 COLL VENOUS BLD VENIPUNCTURE: CPT

## 2024-01-21 PROCEDURE — 82330 ASSAY OF CALCIUM: CPT

## 2024-01-21 PROCEDURE — 2580000003 HC RX 258: Performed by: NURSE PRACTITIONER

## 2024-01-21 PROCEDURE — 80048 BASIC METABOLIC PNL TOTAL CA: CPT

## 2024-01-21 PROCEDURE — 82947 ASSAY GLUCOSE BLOOD QUANT: CPT

## 2024-01-21 PROCEDURE — 85027 COMPLETE CBC AUTOMATED: CPT

## 2024-01-21 PROCEDURE — 99232 SBSQ HOSP IP/OBS MODERATE 35: CPT | Performed by: INTERNAL MEDICINE

## 2024-01-21 PROCEDURE — 85730 THROMBOPLASTIN TIME PARTIAL: CPT

## 2024-01-21 PROCEDURE — 6360000002 HC RX W HCPCS: Performed by: INTERNAL MEDICINE

## 2024-01-21 RX ORDER — OXYBUTYNIN CHLORIDE 5 MG/1
5 TABLET, EXTENDED RELEASE ORAL NIGHTLY PRN
Status: DISCONTINUED | OUTPATIENT
Start: 2024-01-21 | End: 2024-01-23 | Stop reason: HOSPADM

## 2024-01-21 RX ORDER — LEVOFLOXACIN 250 MG/1
250 TABLET, FILM COATED ORAL DAILY
Status: DISCONTINUED | OUTPATIENT
Start: 2024-01-21 | End: 2024-01-23 | Stop reason: HOSPADM

## 2024-01-21 RX ORDER — CALCIUM CARBONATE 500 MG/1
1000 TABLET, CHEWABLE ORAL ONCE
Status: COMPLETED | OUTPATIENT
Start: 2024-01-21 | End: 2024-01-21

## 2024-01-21 RX ADMIN — HEPARIN SODIUM AND DEXTROSE 10 UNITS/KG/HR: 10000; 5 INJECTION INTRAVENOUS at 00:25

## 2024-01-21 RX ADMIN — LEVOFLOXACIN 250 MG: 250 TABLET, FILM COATED ORAL at 10:48

## 2024-01-21 RX ADMIN — DOCUSATE SODIUM 50 MG AND SENNOSIDES 8.6 MG 1 TABLET: 8.6; 5 TABLET, FILM COATED ORAL at 08:22

## 2024-01-21 RX ADMIN — SODIUM CHLORIDE, PRESERVATIVE FREE 10 ML: 5 INJECTION INTRAVENOUS at 20:15

## 2024-01-21 RX ADMIN — TAMSULOSIN HYDROCHLORIDE 0.4 MG: 0.4 CAPSULE ORAL at 08:22

## 2024-01-21 RX ADMIN — ACETAMINOPHEN 650 MG: 325 TABLET ORAL at 06:38

## 2024-01-21 RX ADMIN — ANTACID TABLETS 1000 MG: 500 TABLET, CHEWABLE ORAL at 09:41

## 2024-01-21 RX ADMIN — APIXABAN 5 MG: 5 TABLET, FILM COATED ORAL at 10:48

## 2024-01-21 RX ADMIN — ACETAMINOPHEN 650 MG: 325 TABLET ORAL at 20:14

## 2024-01-21 RX ADMIN — HEPARIN SODIUM AND DEXTROSE 10 UNITS/KG/HR: 10000; 5 INJECTION INTRAVENOUS at 05:10

## 2024-01-21 RX ADMIN — ATORVASTATIN CALCIUM 40 MG: 40 TABLET, FILM COATED ORAL at 20:14

## 2024-01-21 RX ADMIN — APIXABAN 5 MG: 5 TABLET, FILM COATED ORAL at 20:14

## 2024-01-21 ASSESSMENT — PAIN DESCRIPTION - LOCATION: LOCATION: LEG

## 2024-01-21 ASSESSMENT — PAIN SCALES - GENERAL: PAINLEVEL_OUTOF10: 3

## 2024-01-21 ASSESSMENT — PAIN DESCRIPTION - ORIENTATION: ORIENTATION: RIGHT

## 2024-01-21 NOTE — PROGRESS NOTES
[facility]  Office: [phone]  Lorenzo Sosa DO, Tanner Angulo DO, Quintin Donnelly DO, Mikael Tomas DO, Kale Garcia MD, Jennie Guerrier MD, Marie Abdi MD, Radha Shah MD,  Bernard Corrales MD, Ulices Moura MD, Debra Burns MD,  Papa Hogue DO, Abbi Stevens MD, Darion Dumont MD, Todd Sosa DO, Bre Carlos MD,  William Billingsley DO, Alessandra Bernardo MD, Jamia Bay MD, Cyndi Olivia MD, Dom Ruiz MD,  Wilbert Velasquez MD, Mariama Richards MD, Annita Yao MD, Michelle Ortiz MD, Aguilar Vigil MD, Bishop Mcgee MD, Luis Cook DO, Getachew Oseguera DO, Luna Steward MD,  Jeremiah Charlton MD, Shirley Waterhouse, CNP,  Twyla Elena CNP, Tico Taylor, CNP,  Sara Marinelli, STEVEN, Suzette Liz CNP, Bonnie Bruno CNP, Marjorie Samaniego CNP, Izabela Ayala CNP, Meghann Olivia CNP, Viviane Mcpherson, PA-C, Kanwal Nichols PA-C, Mitzi Quintana, CNP, Amina Meyers, CNS, Jacquie Erickson, CNP, Arlette Castellano CNP, Tracy Schwab, CNP         IN-PATIENT SERVICE  [facility]    Progress Note    1/21/2024    9:34 AM    Name:   Jose Maria Ge  MRN:     [id number]     Acct:      [id number]   Room:   [facility] Day:  2  Admit Date:  1/19/2024  4:44 PM    PCP:   No primary care provider on file.  Code Status:  Full Code    Subjective:     C/C:   Chief Complaint   Patient presents with    Abdominal Pain     Interval History Status:   No distress  Comfortable  No pain    Data Base Updates:  Afebrile    [id number] High mg/dL     BUN28 High mg/dL Creatinine2.6 High   Renal function improving    .8 High Panic     Calcium, Ionized1.03 Low     Brief History:     As documented in the medical record:  1/16/2024  \"68-year-old male past medical history of obstructive uropathy, acute renal failure, hydronephrosis, hypertension, gout, BPH, presented to [place] ER with acute urinary retention abdominal pain unable to pass Staley and transferred to [facility]  --  7.8* 7.4*   HCT 27.6*  --  25.2* 24.5*   MCV 90.5  --  88.1 89.4   MCH 27.2  --  27.3 27.0   MCHC 30.1  --  31.0 30.2   RDW 14.7*  --  14.7* 14.7*     --  326 348   MPV 10.0  --  9.9 9.9   SEDRATE 68*  --   --   --    CRP  --   --  50.1*  --    INR  --  1.9 2.3  --    DDIMER  --   --  2.31*  --      Chemistry:  Recent Labs     01/19/24 1803 01/20/24 0022 01/20/24 0413 01/21/24  0419   *  --  138 134*   K 4.7  --  4.4 4.2   CL 99  --  104 102   CO2 20  --  23 24   GLUCOSE 92  --  105* 100*   BUN 30*  --  31* 28*   CREATININE 3.9*  --  3.7* 2.6*   ANIONGAP 14  --  11 8*   LABGLOM 16*  --  17* 26*   CALCIUM 8.4*  --  7.3* 7.4*   CAION  --   --   --  1.03*   PSA  --  12.66*  --   --    TROPHS  --   --  37*  --    LACTACIDWB 2.1  --  1.1  --      Recent Labs     01/19/24 1803 01/20/24 0022 01/20/24  0150 01/20/24  0413 01/20/24  1155 01/21/24  0735   PROT 8.3  --   --   --   --   --    LABALBU 2.6*  --   --   --   --   --    TSH  --  3.90  --   --   --   --    AST 27  --   --   --   --   --    ALT 26  --   --   --   --   --    ALKPHOS 95  --   --   --   --   --    BILITOT 0.5  --   --   --   --   --    LIPASE 25  --   --   --   --   --    POCGLU  --   --  74* 100 106 86     ABG:No results found for: \"POCPH\", \"PHART\", \"PH\", \"POCPCO2\", \"DBM2XBD\", \"PCO2\", \"POCPO2\", \"PO2ART\", \"PO2\", \"POCHCO3\", \"MWN0KIC\", \"HCO3\", \"NBEA\", \"PBEA\", \"BEART\", \"BE\", \"THGBART\", \"THB\", \"JUX1SKQ\", \"EUFV7HJZ\", \"Z1ZTSFAI\", \"O2SAT\", \"FIO2\"  Lab Results   Component Value Date/Time    SPECIAL LT HAND 1ML 01/19/2024 06:05 PM     Lab Results   Component Value Date/Time    CULTURE NO GROWTH 01/19/2024 06:20 PM       Radiology:  CT ABDOMEN PELVIS WO CONTRAST Additional Contrast? None    Addendum Date: 1/19/2024    ADDENDUM: Findings were related to Dr. Silver     Result Date: 1/19/2024  Interval development of markedly distended bladder with prostatomegaly. Malpositioned Staley catheter is noted with tip and balloon within the distal

## 2024-01-21 NOTE — PLAN OF CARE
Problem: Skin/Tissue Integrity  Goal: Absence of new skin breakdown  Description: 1.  Monitor for areas of redness and/or skin breakdown  2.  Assess vascular access sites hourly  3.  Every 4-6 hours minimum:  Change oxygen saturation probe site  4.  Every 4-6 hours:  If on nasal continuous positive airway pressure, respiratory therapy assess nares and determine need for appliance change or resting period.  1/21/2024 1534 by Leila Stewart, RN  Outcome: Progressing  1/21/2024 0403 by Breanna Malloy, RN  Outcome: Progressing     Problem: Safety - Adult  Goal: Free from fall injury  1/21/2024 1534 by Leila Stewart, RN  Outcome: Progressing  1/21/2024 0403 by Breanna Malloy, RN  Outcome: Progressing

## 2024-01-21 NOTE — PROGRESS NOTES
Trey Rizvi Santacroce, Khan, Mostafa, Buck, Murtagh Jr  Urology Progress Note     Subjective:   Overnight, patient's catheter was slightly malpositioned and not draining well  We adjusted this morning and draining well  Creatinine slightly trended down to 2.6  Patient denies any abdominal distention or discomfort and states that he feels generally a lot better      Patient Vitals for the past 24 hrs:   BP Temp Temp src Pulse Resp SpO2   01/21/24 0730 126/82 98.4 °F (36.9 °C) Oral 74 14 100 %   01/20/24 2145 125/62 98.1 °F (36.7 °C) Oral 88 16 99 %       Intake/Output Summary (Last 24 hours) at 1/21/2024 0910  Last data filed at 1/20/2024 2200  Gross per 24 hour   Intake --   Output 550 ml   Net -550 ml       Recent Labs     01/19/24  1803 01/20/24  0413 01/21/24  0419   WBC 14.6* 11.8* 11.8*   HGB 8.3* 7.8* 7.4*   HCT 27.6* 25.2* 24.5*   MCV 90.5 88.1 89.4    326 348     Recent Labs     01/19/24  1803 01/20/24  0413 01/21/24  0419   * 138 134*   K 4.7 4.4 4.2   CL 99 104 102   CO2 20 23 24   BUN 30* 31* 28*   CREATININE 3.9* 3.7* 2.6*       Recent Labs     01/19/24  1820   COLORU Red*   PHUR 7.5   WBCUA TOO NUMEROUS TO COUNT   RBCUA TOO NUMEROUS TO COUNT   BACTERIA None   SPECGRAV 1.023   LEUKOCYTESUR LARGE*   UROBILINOGEN Normal   BILIRUBINUR NEGATIVE  Verified by ictotest.*       Additional Lab/culture results: None    Physical Exam:   AAOx3  NAD  Unlabored breathing  Normal rate  Abdomen soft, appropriately tender to palpation, nondistended  : Staley in place draining clear yellow urine  No calf tenderness to palpation     Interval Imaging Findings: None    Impression:   68 y.o. male   Prostatomegaly -seen on CT abdomen pelvis  History of severe high-volume urinary retention  Malpositioned Staley catheter -Staley catheter tip in prostatic urethra.  Staley catheter was advanced in the bladder with immediate return of high urine volume  Bilateral hydroureteronephrosis -likely secondary to severely

## 2024-01-21 NOTE — CARE COORDINATION
01/21/24 1416   Readmission Assessment   Number of Days since last admission? 8-30 days   Previous Disposition SNF   Who is being Interviewed Patient   What was the patient's/caregiver's perception as to why they think they needed to return back to the hospital?   (pelvic pain abdominal pain)   Did you visit your Primary Care Physician after you left the hospital, before you returned this time? No   Why weren't you able to visit your PCP? Other (Comment)  (at facility)   Did you see a specialist, such as Cardiac, Pulmonary, Orthopedic Physician, etc. after you left the hospital? No   Who advised the patient to return to the hospital? Skilled Unit   Does the patient report anything that got in the way of taking their medications? No   In our efforts to provide the best possible care to you and others like you, can you think of anything that we could have done to help you after you left the hospital the first time, so that you might not have needed to return so soon? Other (Comment)  (return for new symptoms)

## 2024-01-21 NOTE — PLAN OF CARE
Problem: Discharge Planning  Goal: Discharge to home or other facility with appropriate resources  Outcome: Progressing     Problem: Skin/Tissue Integrity  Goal: Absence of new skin breakdown  Description: 1.  Monitor for areas of redness and/or skin breakdown  2.  Assess vascular access sites hourly  3.  Every 4-6 hours minimum:  Change oxygen saturation probe site  4.  Every 4-6 hours:  If on nasal continuous positive airway pressure, respiratory therapy assess nares and determine need for appliance change or resting period.  Outcome: Progressing     Problem: Safety - Adult  Goal: Free from fall injury  1/21/2024 0403 by Breanna Malloy, RN  Outcome: Progressing  1/20/2024 1559 by Leila Stewart, RN  Outcome: Progressing

## 2024-01-22 PROBLEM — R31.0 GROSS HEMATURIA: Status: ACTIVE | Noted: 2024-01-22

## 2024-01-22 PROBLEM — Z71.89 GOALS OF CARE, COUNSELING/DISCUSSION: Status: ACTIVE | Noted: 2024-01-22

## 2024-01-22 PROBLEM — Z51.5 ENCOUNTER FOR PALLIATIVE CARE: Status: ACTIVE | Noted: 2024-01-22

## 2024-01-22 LAB
ABO/RH: NORMAL
ALBUMIN SERPL-MCNC: 2.7 G/DL
ANION GAP SERPL CALCULATED.3IONS-SCNC: 10 MMOL/L (ref 9–16)
ANTIBODY SCREEN: NEGATIVE
ARM BAND NUMBER: NORMAL
BLOOD BANK DISPENSE STATUS: NORMAL
BLOOD BANK SAMPLE EXPIRATION: NORMAL
BPU ID: NORMAL
BUN SERPL-MCNC: 22 MG/DL (ref 8–23)
CA-I BLD-SCNC: 1.03 MMOL/L (ref 1.13–1.33)
CALCIUM SERPL-MCNC: 7.9 MG/DL (ref 8.6–10.4)
CHLORIDE SERPL-SCNC: 105 MMOL/L (ref 98–107)
CO2 SERPL-SCNC: 22 MMOL/L (ref 20–31)
COMPONENT: NORMAL
CREAT SERPL-MCNC: 1.7 MG/DL (ref 0.7–1.2)
CROSSMATCH RESULT: NORMAL
EKG ATRIAL RATE: 82 BPM
EKG P AXIS: 75 DEGREES
EKG P-R INTERVAL: 222 MS
EKG Q-T INTERVAL: 392 MS
EKG QRS DURATION: 94 MS
EKG QTC CALCULATION (BAZETT): 457 MS
EKG R AXIS: -44 DEGREES
EKG T AXIS: 5 DEGREES
EKG VENTRICULAR RATE: 82 BPM
ERYTHROCYTE [DISTWIDTH] IN BLOOD BY AUTOMATED COUNT: 14.8 % (ref 11.8–14.4)
GFR SERPL CREATININE-BSD FRML MDRD: 43 ML/MIN/1.73M2
GLUCOSE BLD-MCNC: 106 MG/DL (ref 75–110)
GLUCOSE BLD-MCNC: 88 MG/DL (ref 75–110)
GLUCOSE SERPL-MCNC: 98 MG/DL (ref 74–99)
HCT VFR BLD AUTO: 25.6 % (ref 40.7–50.3)
HGB BLD-MCNC: 7.9 G/DL (ref 13–17)
MCH RBC QN AUTO: 27.5 PG (ref 25.2–33.5)
MCHC RBC AUTO-ENTMCNC: 30.9 G/DL (ref 28.4–34.8)
MCV RBC AUTO: 89.2 FL (ref 82.6–102.9)
NRBC BLD-RTO: 0 PER 100 WBC
PHOSPHATE SERPL-MCNC: 3.3 MG/DL (ref 2.5–4.5)
PLATELET # BLD AUTO: 356 K/UL (ref 138–453)
PMV BLD AUTO: 9.7 FL (ref 8.1–13.5)
POTASSIUM SERPL-SCNC: 4.2 MMOL/L (ref 3.7–5.3)
RBC # BLD AUTO: 2.87 M/UL (ref 4.21–5.77)
SODIUM SERPL-SCNC: 137 MMOL/L (ref 136–145)
TRANSFUSION STATUS: NORMAL
UNIT DIVISION: 0
WBC OTHER # BLD: 9.4 K/UL (ref 3.5–11.3)

## 2024-01-22 PROCEDURE — 97535 SELF CARE MNGMENT TRAINING: CPT

## 2024-01-22 PROCEDURE — 82330 ASSAY OF CALCIUM: CPT

## 2024-01-22 PROCEDURE — 6370000000 HC RX 637 (ALT 250 FOR IP): Performed by: NURSE PRACTITIONER

## 2024-01-22 PROCEDURE — 97166 OT EVAL MOD COMPLEX 45 MIN: CPT

## 2024-01-22 PROCEDURE — 6370000000 HC RX 637 (ALT 250 FOR IP): Performed by: INTERNAL MEDICINE

## 2024-01-22 PROCEDURE — 2580000003 HC RX 258: Performed by: NURSE PRACTITIONER

## 2024-01-22 PROCEDURE — 97530 THERAPEUTIC ACTIVITIES: CPT

## 2024-01-22 PROCEDURE — 36415 COLL VENOUS BLD VENIPUNCTURE: CPT

## 2024-01-22 PROCEDURE — 93010 ELECTROCARDIOGRAM REPORT: CPT | Performed by: INTERNAL MEDICINE

## 2024-01-22 PROCEDURE — 85027 COMPLETE CBC AUTOMATED: CPT

## 2024-01-22 PROCEDURE — 99223 1ST HOSP IP/OBS HIGH 75: CPT

## 2024-01-22 PROCEDURE — 82947 ASSAY GLUCOSE BLOOD QUANT: CPT

## 2024-01-22 PROCEDURE — 99232 SBSQ HOSP IP/OBS MODERATE 35: CPT | Performed by: INTERNAL MEDICINE

## 2024-01-22 PROCEDURE — 80069 RENAL FUNCTION PANEL: CPT

## 2024-01-22 PROCEDURE — 1200000000 HC SEMI PRIVATE

## 2024-01-22 RX ORDER — CALCIUM CARBONATE 500 MG/1
1000 TABLET, CHEWABLE ORAL ONCE
Status: COMPLETED | OUTPATIENT
Start: 2024-01-22 | End: 2024-01-22

## 2024-01-22 RX ORDER — WATER 10 ML/10ML
INJECTION INTRAMUSCULAR; INTRAVENOUS; SUBCUTANEOUS
Status: DISPENSED
Start: 2024-01-22 | End: 2024-01-22

## 2024-01-22 RX ORDER — HYDROCODONE BITARTRATE AND ACETAMINOPHEN 5; 325 MG/1; MG/1
1 TABLET ORAL EVERY 6 HOURS PRN
Status: DISCONTINUED | OUTPATIENT
Start: 2024-01-22 | End: 2024-01-23 | Stop reason: HOSPADM

## 2024-01-22 RX ORDER — MIDODRINE HYDROCHLORIDE 10 MG/1
10 TABLET ORAL PRN
Qty: 90 TABLET | Refills: 3 | DISCHARGE
Start: 2024-01-22

## 2024-01-22 RX ORDER — ONDANSETRON 4 MG/1
4 TABLET, ORALLY DISINTEGRATING ORAL EVERY 8 HOURS PRN
DISCHARGE
Start: 2024-01-22

## 2024-01-22 RX ORDER — LEVOFLOXACIN 250 MG/1
250 TABLET, FILM COATED ORAL DAILY
Qty: 5 TABLET | Refills: 0 | DISCHARGE
Start: 2024-01-23 | End: 2024-01-28

## 2024-01-22 RX ORDER — TAMSULOSIN HYDROCHLORIDE 0.4 MG/1
0.4 CAPSULE ORAL DAILY
Qty: 30 CAPSULE | Refills: 3 | DISCHARGE
Start: 2024-01-23

## 2024-01-22 RX ADMIN — TAMSULOSIN HYDROCHLORIDE 0.4 MG: 0.4 CAPSULE ORAL at 08:33

## 2024-01-22 RX ADMIN — APIXABAN 5 MG: 5 TABLET, FILM COATED ORAL at 20:25

## 2024-01-22 RX ADMIN — ATORVASTATIN CALCIUM 40 MG: 40 TABLET, FILM COATED ORAL at 20:25

## 2024-01-22 RX ADMIN — DOCUSATE SODIUM 50 MG AND SENNOSIDES 8.6 MG 1 TABLET: 8.6; 5 TABLET, FILM COATED ORAL at 08:33

## 2024-01-22 RX ADMIN — APIXABAN 5 MG: 5 TABLET, FILM COATED ORAL at 08:33

## 2024-01-22 RX ADMIN — HYDROCODONE BITARTRATE AND ACETAMINOPHEN 1 TABLET: 5; 325 TABLET ORAL at 21:01

## 2024-01-22 RX ADMIN — SODIUM CHLORIDE, PRESERVATIVE FREE 10 ML: 5 INJECTION INTRAVENOUS at 08:33

## 2024-01-22 RX ADMIN — SODIUM CHLORIDE, PRESERVATIVE FREE 10 ML: 5 INJECTION INTRAVENOUS at 20:25

## 2024-01-22 RX ADMIN — ACETAMINOPHEN 650 MG: 325 TABLET ORAL at 16:25

## 2024-01-22 RX ADMIN — ANTACID TABLETS 1000 MG: 500 TABLET, CHEWABLE ORAL at 15:51

## 2024-01-22 RX ADMIN — LEVOFLOXACIN 250 MG: 250 TABLET, FILM COATED ORAL at 08:33

## 2024-01-22 ASSESSMENT — PAIN DESCRIPTION - LOCATION: LOCATION: KNEE

## 2024-01-22 ASSESSMENT — ENCOUNTER SYMPTOMS
SHORTNESS OF BREATH: 0
VOMITING: 0
ABDOMINAL PAIN: 0
NAUSEA: 0
COUGH: 0

## 2024-01-22 ASSESSMENT — PAIN DESCRIPTION - ORIENTATION: ORIENTATION: RIGHT

## 2024-01-22 ASSESSMENT — PAIN DESCRIPTION - DESCRIPTORS: DESCRIPTORS: ACHING

## 2024-01-22 ASSESSMENT — PAIN SCALES - GENERAL
PAINLEVEL_OUTOF10: 3
PAINLEVEL_OUTOF10: 8

## 2024-01-22 NOTE — DISCHARGE INSTR - COC
Continuity of Care Form    Patient Name: Jose Maria Ge   :  1955  MRN:  [id number]    Admit date:  2024  Discharge date:  2024    Code Status Order: Full Code   Advance Directives:     Admitting Physician:  Tanner Angulo DO  PCP: No primary care provider on file.    Discharging Nurse: Jhoana  Discharging Hospital Unit/Room#: 0315/0315-01  Discharging Unit Phone Number: [phone]    Emergency Contact:   Extended Emergency Contact Information  Primary Emergency Contact: Matt Ge  Home Phone: [phone]  Relation: Brother/Sister  Secondary Emergency Contact: Nirmala Sun  Home Phone: [phone]  Relation: Brother/Sister  Preferred language: English    Past Surgical History:  Past Surgical History:   Procedure Laterality Date    VASCULAR SURGERY Right 2024    MECHANICAL THROMBECTOMY OF BILATERAL PULMONARY EMBOLISM'S VIA PENUMBRA performed by Aftab Leyva MD at [facility]       Immunization History:   Immunization History   Administered Date(s) Administered    COVID-19, MODERNA BLUE border, Primary or Immunocompromised, (age 12y+), IM, 100 mcg/0.5mL 2021, 2021       Active Problems:  Patient Active Problem List   Diagnosis Code    Acute massive pulmonary embolism (HCC) I26.99    Acute urinary retention R33.8    BPH (benign prostatic hyperplasia) N40.0    Hematuria R31.9    UTI (urinary tract infection) N39.0    Hypertension I10    Hydronephrosis N13.30    Obstructive uropathy N13.9    Acute saddle pulmonary embolism with acute cor pulmonale ([facility]) I26.02    Renal failure syndrome N19    Pyelonephritis N12    Bandemia D72.825    Septicemia due to Klebsiella pneumoniae (HCC) A41.4    CHRIS (acute kidney injury) ([facility]) N17.9    Sepsis due to Klebsiella ([facility]) A41.59    Persistent fever R50.9    Staley catheter in place Z97.8    Bacteremia due to Klebsiella pneumoniae R78.81, B96.1    PSA elevated R97.20    Acute cystitis N30.00    Troponin level elevated R79.89    Hypocalcemia

## 2024-01-22 NOTE — PROGRESS NOTES
[facility]  Office: [phone]  Lorenzo Sosa DO, Tanner Angulo DO, Quintin Donnelly DO, Mikael Tomas DO, Kale Garcia MD, Jennie Guerrier MD, Marie Abdi MD, Radha Shah MD,  Bernard Corrales MD, Ulices Moura MD, Debra Burns MD,  Papa Hogue DO, Abbi Stevens MD, Darion Dumont MD, Todd Sosa DO, Bre Carlos MD,  William Billingsley DO, Alessandra Bernardo MD, Jamia Bay MD, Cyndi Olivia MD, Dom Ruiz MD,  Wilbert Velasquez MD, Mariama Richards MD, Annita Yao MD, Michelle Ortiz MD, Aguilar Vigil MD, Bishop Mcgee MD, Luis Cook DO, Getcahew Oseguera DO, Luna Steward MD,  Jeremiah Charlton MD, Shirley Waterhouse, CNP,  Twyla Elena CNP, Tico Taylor, CNP,  Sara Marinelli, STEVEN, Suzette Liz, CNP, Bonnie Bruno CNP, Marjorie Samaniego CNP, Izabela Ayala CNP, Meghann Olivia CNP, Viviane Mcpherson, PA-C, Kanwal Nichols PA-C, Mitzi Quintana, CNP, Amina Meyers, CNS, Jacquie Erickson, CNP, Arlette Castellano CNP, Tracy Schwab, CNP         IN-PATIENT SERVICE  [facility]    Progress Note    1/22/2024    9:17 AM    Name:   Jose Maria Ge  MRN:     [id number]     Acct:      [id number]   Room:   [address] [place], [place] [address] Day:  3  Admit Date:  1/19/2024  4:44 PM    PCP:   No primary care provider on file.  Code Status:  Full Code    Subjective:     C/C:   Chief Complaint   Patient presents with    Abdominal Pain     Interval History Status:   Fully obstructed last night  He had 1700 mL of urine retention again  Now having gross hematuria    Data Base Updates:  Afebrile    The patient has been in negative fluid balance    Cultures are in process and negative so far    Calcium, Ionized1.03 Low   Calcium7.9 Low     WBC9.4k/uL RBC2.87 Low m/uL Hemoglobin7.9 Low     Brief History:     As documented in the medical record:  1/16/2024  \"68-year-old male past medical history of obstructive uropathy, acute renal failure, hydronephrosis, hypertension, gout, BPH, presented to

## 2024-01-22 NOTE — ACP (ADVANCE CARE PLANNING)
Advance Care Planning     Advance Care Planning (ACP) Physician/NP/PA Conversation    Date of Conversation: 1/19/2024  Conducted with: Patient with Decision Making Capacity    Healthcare Decision Maker:    Primary Decision Maker: Matt Ge - Brother/Sister - [phone]    Secondary Decision Maker: Nirmala Sun - Brother/Sister - [phone]    Supplemental (Other) Decision Maker: Jes Ge - Other Relative - [phone]  Click here to complete Healthcare Decision Makers including selection of the Healthcare Decision Maker Relationship (ie \"Primary\").         Care Preferences:    Hospitalization:  \"If your health worsens and it becomes clear that your chance of recovery is unlikely, what would be your preference regarding hospitalization?\"  The patient would prefer hospitalization.    Ventilation:  \"If you were unable to breath on your own and your chance of recovery was unlikely, what would be your preference about the use of a ventilator (breathing machine) if it was available to you?\"  The patient would desire the use of a ventilator.    Resuscitation:  \"In the event your heart stopped as a result of an underlying serious health condition, would you want attempts made to restart your heart, or would you prefer a natural death?\"  Yes, attempt to resuscitate.    treatment goals    Conversation Outcomes / Follow-Up Plan:  ACP in process - information provided, considering goals and options  Reviewed DNR/DNI and patient elects Full Code (Attempt Resuscitation)    Length of Voluntary ACP Conversation in minutes:  <16 minutes (Non-Billable)    Maddi Redding, APRN - CNP

## 2024-01-22 NOTE — PROGRESS NOTES
Occupational Therapy  Facility/Department: [facility] RENAL//MED SURG  Occupational Therapy Initial Assessment    Name: Jose Maria Ge  : 1955  MRN: [id number]  Date of Service: 2024    Discharge Recommendations:  Patient would benefit from continued therapy after discharge  OT Equipment Recommendations  Equipment Needed: Yes  Mobility Devices: ADL Assistive Devices;Walker  Walker: Rolling (Barriatric)  ADL Assistive Devices: Toileting - Drop Arm Commode, Heavy Duty Drop Arm Commode;Transfer Tub Bench;Grab Bars - shower;Reacher;Long-handled Shoe Horn;Sock-Aid Hard       Patient Diagnosis(es): The primary encounter diagnosis was CHRIS (acute kidney injury) (HCC). Diagnoses of Nausea and vomiting, unspecified vomiting type and Urinary tract infection associated with indwelling urethral catheter, subsequent encounter were also pertinent to this visit.  Past Medical History:  has no past medical history on file.  Past Surgical History:  has a past surgical history that includes vascular surgery (Right, 2024).       Chief Complaint   Patient presents with    Abdominal Pain         Assessment   Performance deficits / Impairments: Decreased functional mobility ;Decreased ADL status;Decreased endurance;Decreased high-level IADLs;Decreased balance  Assessment: Pt agreeable to OT evaluation this AM. Pt completed bed mobility with SBA, functional transfers with CGA and functional side steps along HOB with CGA and use of RW. Pt significantly limited by dizziness intermittently throughout session with rest breaks required througout. Pt completed grooming tasks seated EOB with modified independence. Grossly limited by dizziness and decreased balance/endurance. Pt is expected to require skilled OT services during their acute hospitalization stay to address the above noted deficits through skilled occupational therapy intervention for promotion of increased independence throughout ADLs, IADLs and functional mobility tasks.

## 2024-01-22 NOTE — PROGRESS NOTES
Trey Rizvi Santacroce, Khan, Mostafa, Buck, Murtagh Jr  Urology Progress Note     Subjective:   Overnight patient Tsaley catheter stopped draining, the nurse advanced Staley catheter, deflated balloon and then reinflated Staley catheter balloon with 10 mL of sterile water, there was immediate output of 1.7 L of urine    Cr 1.7 (2.6)      Patient Vitals for the past 24 hrs:   BP Temp Temp src Pulse Resp SpO2   01/22/24 0735 138/82 98.6 °F (37 °C) Oral 76 16 100 %   01/21/24 1916 135/81 97.9 °F (36.6 °C) Oral 74 15 100 %         Intake/Output Summary (Last 24 hours) at 1/22/2024 0828  Last data filed at 1/22/2024 0546  Gross per 24 hour   Intake --   Output 2500 ml   Net -2500 ml         Recent Labs     01/20/24  0413 01/21/24  0419 01/22/24  0521   WBC 11.8* 11.8* 9.4   HGB 7.8* 7.4* 7.9*   HCT 25.2* 24.5* 25.6*   MCV 88.1 89.4 89.2    348 356       Recent Labs     01/20/24  0413 01/21/24  0419 01/22/24  0521    134* 137   K 4.4 4.2 4.2    102 105   CO2 23 24 22   PHOS  --   --  3.3   BUN 31* 28* 22   CREATININE 3.7* 2.6* 1.7*         Recent Labs     01/19/24  1820   COLORU Red*   PHUR 7.5   WBCUA TOO NUMEROUS TO COUNT   RBCUA TOO NUMEROUS TO COUNT   BACTERIA None   SPECGRAV 1.023   LEUKOCYTESUR LARGE*   UROBILINOGEN Normal   BILIRUBINUR NEGATIVE  Verified by ictotest.*         Additional Lab/culture results: None    Physical Exam:   AAOx3  NAD  Unlabored breathing  Normal rate  Abdomen soft, appropriately tender to palpation, nondistended  : Staley in place draining clear yellow urine  No calf tenderness to palpation     Interval Imaging Findings: None    Impression:   68 y.o. male   Prostatomegaly -seen on CT abdomen pelvis  History of severe high-volume urinary retention  Malpositioned Staley catheter -Staley catheter tip easily falls into prostatic urethra. Required multiple repositioning.  Bilateral hydroureteronephrosis -likely secondary to severely distended bladder  Postrenal CHRIS  -creatinine 3.9 on arrival, baseline 0.9.  Due to severely distended bladder and postrenal obstructive uropathy.    Plan:   Urology hubbed keys catheter, deflated Keys catheter balloon, then reinflated Keys catheter balloon with 25 mL of sterile water. Maintain at least 25 mL of sterile water in keys catheter balloon.  Patient has a large prostate for which the catheter tip may slip.  Thus, we will recommend the the Keys catheter be secured in place and catheter does not get moved around a whole lot  Trend creatinine, replete electrolytes as needed  Continue flomax daily  Follow-up on urine and blood cultures and treat with culture sensitive antibiotics  Continue Keys catheter  Patient will need to follow-up outpatient with his primary urologist Dr. Stratton to discuss bladder outlet reduction procedure      Danilo Gonzales MD  PGY-4,Urology  8:28 AM 1/22/2024

## 2024-01-22 NOTE — CONSULTS
Palliative Care Inpatient Consult    NAME:  Jose Maria Ge  MEDICAL RECORD NUMBER:  [id number]  AGE: 68 y.o.   GENDER: male  : 1955  TODAY'S DATE:  2024    Reasons for Consultation:    Symptom and/or pain management  Provision of information regarding PC and/or hospice philosophies  Complex, time-intensive communication and interdisciplinary psychosocial support  Clarification of goals of care and/or assistance with difficult decision-making  Guidance in regards to resources and transition(s)    Members of PC team contributing to this consultation are : Maddi Redding, Palliative Care APRN-CNP    Plan      Palliative Interaction: Patient seen and examined on rounds. Patient resting comfortably in bed.     Introduce myself and the palliative role. Patient states he has been in and out of the hospital recently. He shares his plan is to follow up with his urologist and hopefully undergo surgery. He is hopeful for this stating he is tired of being in the hospital and being stuck in bed. He shares typically he lives at home alone and is completely independent.     Patient has completed POA paperwork, documentation found in chart and patient confirms this to be accurate. This names his brother Matt as his decision maker, with his sister Nirmala and sister in law Jes as alternatives.     We discuss goals and wishes. Patient again reiterates his goal to receive surgery. He is hopeful this will help his recent urinary problems and get him back to normal living. 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 Patient expresses his wishes to remain a full code at this time.     Plan to follow up with urology outpatient. No further palliative interventions at this time. Palliative to sign off. Should any additional needs arise please feel free to contact us.     Education/support to staff  Education/support to family  Education/support to patient  Discharge planning/helping to coordinate  Multiplanar reformatted images are provided for review.  Automated exposure control, iterative reconstruction, and/or weight based  adjustment of the mA/kV was utilized to reduce the radiation dose to as low  as reasonably achievable.    COMPARISON:  CT abdomen and pelvis 01/11/2024.    HISTORY:  ORDERING SYSTEM PROVIDED HISTORY: Constipation. abd pain. N/V. rule SBO,  cholecystitis, and pancreatitis.  TECHNOLOGIST PROVIDED HISTORY:  Constipation. abd pain. N/V. rule SBO, cholecystitis, and pancreatitis.    Decision Support Exception - unselect if not a suspected or confirmed  emergency medical condition->Emergency Medical Condition (MA)  Reason for Exam: Constipation. abd pain. N/V. rule SBO, cholecystitis, and  pancreatitis.    FINDINGS:  Evaluation is limited without IV contrast.    Bilateral lower lobe bronchiectasis and atelectasis.  Visualized mediastinum  demonstrates coronary artery atherosclerosis.    The liver is unremarkable.  Gallbladder demonstrates high density layering  material.  Spleen, pancreas, and adrenal glands are unremarkable.    There is interval development of bilateral hydroureteronephrosis.  There is  interval marked dilation of the bladder.  The prostate is markedly enlarged.  The Staley catheter tip terminates in the prostatic urethra and the balloon is  inflated within the distal urethra.    Stomach and small bowel are unremarkable.  Colon and appendix are  unremarkable.    The abdominal aorta is normal in caliber with atherosclerosis.  No adenopathy.    No free air or free fluid.    Soft tissues are unremarkable.  Degenerative changes spine.  No acute osseous  abnormality.    Impression  Interval development of markedly distended bladder with prostatomegaly.  Malpositioned Staley catheter is noted with tip and balloon within the distal  ureter.  Additionally, there is resultant bilateral hydroureteronephrosis,  likely related to bladder outlet obstruction.       Xray Result (most

## 2024-01-22 NOTE — PLAN OF CARE
Problem: Discharge Planning  Goal: Discharge to home or other facility with appropriate resources  1/22/2024 1216 by Mabel Mckeon RN  Outcome: Progressing  1/22/2024 0439 by Sesar Johnson RN  Outcome: Progressing     Problem: Skin/Tissue Integrity  Goal: Absence of new skin breakdown  Description: 1.  Monitor for areas of redness and/or skin breakdown  2.  Assess vascular access sites hourly  3.  Every 4-6 hours minimum:  Change oxygen saturation probe site  4.  Every 4-6 hours:  If on nasal continuous positive airway pressure, respiratory therapy assess nares and determine need for appliance change or resting period.  1/22/2024 1216 by Mabel Mckeon, RN  Outcome: Progressing  1/22/2024 0439 by Sesar Johnson RN  Outcome: Progressing     Problem: Safety - Adult  Goal: Free from fall injury  1/22/2024 1216 by Mabel Mckeon, RN  Outcome: Progressing  1/22/2024 0439 by Sesar Johnson RN  Outcome: Progressing     Problem: ABCDS Injury Assessment  Goal: Absence of physical injury  1/22/2024 1216 by Mabel Mckeon RN  Outcome: Progressing  1/22/2024 0439 by Sesar Johnson RN  Outcome: Progressing

## 2024-01-23 VITALS
TEMPERATURE: 98.3 F | RESPIRATION RATE: 16 BRPM | DIASTOLIC BLOOD PRESSURE: 76 MMHG | HEART RATE: 72 BPM | WEIGHT: 309.08 LBS | BODY MASS INDEX: 38.43 KG/M2 | SYSTOLIC BLOOD PRESSURE: 144 MMHG | OXYGEN SATURATION: 100 % | HEIGHT: 75 IN

## 2024-01-23 LAB
ALBUMIN SERPL-MCNC: 2.5 G/DL (ref 3.5–5.2)
ANION GAP SERPL CALCULATED.3IONS-SCNC: 6 MMOL/L (ref 9–17)
BASOPHILS # BLD: 0.04 K/UL (ref 0–0.2)
BASOPHILS NFR BLD: 1 % (ref 0–2)
BUN SERPL-MCNC: 17 MG/DL (ref 8–23)
CA-I BLD-SCNC: 1.05 MMOL/L (ref 1.13–1.33)
CALCIUM SERPL-MCNC: 7.7 MG/DL (ref 8.6–10.4)
CHLORIDE SERPL-SCNC: 102 MMOL/L (ref 98–107)
CO2 SERPL-SCNC: 27 MMOL/L (ref 20–31)
CREAT SERPL-MCNC: 1.3 MG/DL (ref 0.7–1.2)
EOSINOPHIL # BLD: 0.16 K/UL (ref 0–0.44)
EOSINOPHILS RELATIVE PERCENT: 2 % (ref 1–4)
ERYTHROCYTE [DISTWIDTH] IN BLOOD BY AUTOMATED COUNT: 14.5 % (ref 11.8–14.4)
GFR SERPL CREATININE-BSD FRML MDRD: 59 ML/MIN/1.73M2
GLUCOSE SERPL-MCNC: 97 MG/DL (ref 70–99)
HCT VFR BLD AUTO: 25.7 % (ref 40.7–50.3)
HGB BLD-MCNC: 7.6 G/DL (ref 13–17)
IMM GRANULOCYTES # BLD AUTO: 0.04 K/UL (ref 0–0.3)
IMM GRANULOCYTES NFR BLD: 1 %
LYMPHOCYTES NFR BLD: 1.19 K/UL (ref 1.1–3.7)
LYMPHOCYTES RELATIVE PERCENT: 17 % (ref 24–43)
MCH RBC QN AUTO: 27 PG (ref 25.2–33.5)
MCHC RBC AUTO-ENTMCNC: 29.6 G/DL (ref 28.4–34.8)
MCV RBC AUTO: 91.5 FL (ref 82.6–102.9)
MONOCYTES NFR BLD: 0.41 K/UL (ref 0.1–1.2)
MONOCYTES NFR BLD: 6 % (ref 3–12)
NEUTROPHILS NFR BLD: 74 % (ref 36–65)
NEUTS SEG NFR BLD: 5.39 K/UL (ref 1.5–8.1)
NRBC BLD-RTO: 0 PER 100 WBC
PHOSPHATE SERPL-MCNC: 2.9 MG/DL (ref 2.5–4.5)
PLATELET # BLD AUTO: 297 K/UL (ref 138–453)
PMV BLD AUTO: 9.4 FL (ref 8.1–13.5)
POTASSIUM SERPL-SCNC: 4.1 MMOL/L (ref 3.7–5.3)
RBC # BLD AUTO: 2.81 M/UL (ref 4.21–5.77)
RBC # BLD: ABNORMAL 10*6/UL
SODIUM SERPL-SCNC: 135 MMOL/L (ref 135–144)
WBC OTHER # BLD: 7.2 K/UL (ref 3.5–11.3)

## 2024-01-23 PROCEDURE — 2580000003 HC RX 258: Performed by: NURSE PRACTITIONER

## 2024-01-23 PROCEDURE — 85025 COMPLETE CBC W/AUTO DIFF WBC: CPT

## 2024-01-23 PROCEDURE — 80069 RENAL FUNCTION PANEL: CPT

## 2024-01-23 PROCEDURE — 6370000000 HC RX 637 (ALT 250 FOR IP): Performed by: INTERNAL MEDICINE

## 2024-01-23 PROCEDURE — 6370000000 HC RX 637 (ALT 250 FOR IP): Performed by: NURSE PRACTITIONER

## 2024-01-23 PROCEDURE — 99239 HOSP IP/OBS DSCHRG MGMT >30: CPT | Performed by: STUDENT IN AN ORGANIZED HEALTH CARE EDUCATION/TRAINING PROGRAM

## 2024-01-23 PROCEDURE — 36415 COLL VENOUS BLD VENIPUNCTURE: CPT

## 2024-01-23 PROCEDURE — 82330 ASSAY OF CALCIUM: CPT

## 2024-01-23 PROCEDURE — 97530 THERAPEUTIC ACTIVITIES: CPT

## 2024-01-23 RX ADMIN — TAMSULOSIN HYDROCHLORIDE 0.4 MG: 0.4 CAPSULE ORAL at 07:58

## 2024-01-23 RX ADMIN — SODIUM CHLORIDE, PRESERVATIVE FREE 10 ML: 5 INJECTION INTRAVENOUS at 07:58

## 2024-01-23 RX ADMIN — HYDROCODONE BITARTRATE AND ACETAMINOPHEN 1 TABLET: 5; 325 TABLET ORAL at 04:51

## 2024-01-23 RX ADMIN — DOCUSATE SODIUM 50 MG AND SENNOSIDES 8.6 MG 1 TABLET: 8.6; 5 TABLET, FILM COATED ORAL at 07:58

## 2024-01-23 RX ADMIN — LEVOFLOXACIN 250 MG: 250 TABLET, FILM COATED ORAL at 07:57

## 2024-01-23 RX ADMIN — APIXABAN 5 MG: 5 TABLET, FILM COATED ORAL at 07:58

## 2024-01-23 ASSESSMENT — PAIN SCALES - GENERAL: PAINLEVEL_OUTOF10: 9

## 2024-01-23 ASSESSMENT — PAIN DESCRIPTION - DESCRIPTORS: DESCRIPTORS: ACHING

## 2024-01-23 ASSESSMENT — PAIN DESCRIPTION - LOCATION: LOCATION: KNEE

## 2024-01-23 ASSESSMENT — PAIN DESCRIPTION - ORIENTATION: ORIENTATION: RIGHT

## 2024-01-23 NOTE — PLAN OF CARE
Problem: Skin/Tissue Integrity  Goal: Absence of new skin breakdown  Description: 1.  Monitor for areas of redness and/or skin breakdown  2.  Assess vascular access sites hourly  3.  Every 4-6 hours minimum:  Change oxygen saturation probe site  4.  Every 4-6 hours:  If on nasal continuous positive airway pressure, respiratory therapy assess nares and determine need for appliance change or resting period.  1/23/2024 0245 by Sirena Eddy, RN  Outcome: Progressing

## 2024-01-23 NOTE — CARE COORDINATION
Transitional Planning:  MAURICE Belle at [facility] [phone].  She has PA.  Paperwork faxed to [facility] for transport.    14:12  Informed Barbra of 4 pm transport .  [employer] faxed to [facility].

## 2024-01-23 NOTE — PLAN OF CARE
Problem: Discharge Planning  Goal: Discharge to home or other facility with appropriate resources  Outcome: Progressing     Problem: Skin/Tissue Integrity  Goal: Absence of new skin breakdown  Description: 1.  Monitor for areas of redness and/or skin breakdown  2.  Assess vascular access sites hourly  3.  Every 4-6 hours minimum:  Change oxygen saturation probe site  4.  Every 4-6 hours:  If on nasal continuous positive airway pressure, respiratory therapy assess nares and determine need for appliance change or resting period.  1/23/2024 1325 by Mabel Mckeon, RN  Outcome: Progressing  1/23/2024 0245 by Sirena Eddy, RN  Outcome: Progressing     Problem: Safety - Adult  Goal: Free from fall injury  Outcome: Progressing     Problem: ABCDS Injury Assessment  Goal: Absence of physical injury  Outcome: Progressing

## 2024-01-23 NOTE — PROGRESS NOTES
Physical Therapy  Facility/Department: [facility] RENAL//MED SURG  Physical Therapy Daily Treatment Note    Name: Jose Maria Ge  : 1955  MRN: [id number]  Date of Service: 2024  Chief Complaint   Patient presents with    Abdominal Pain       Discharge Recommendations:  Patient would benefit from continued therapy after discharge   PT Equipment Recommendations  Equipment Needed: No      Patient Diagnosis(es): The primary encounter diagnosis was CHRIS (acute kidney injury) (HCC). Diagnoses of Nausea and vomiting, unspecified vomiting type and Urinary tract infection associated with indwelling urethral catheter, subsequent encounter were also pertinent to this visit.  Past Medical History:  has no past medical history on file.  Past Surgical History:  has a past surgical history that includes vascular surgery (Right, 2024).    Assessment   Body Structures, Functions, Activity Limitations Requiring Skilled Therapeutic Intervention: Decreased functional mobility ;Decreased strength;Decreased endurance;Decreased balance  Assessment: Pt required Rani to perform bed mobility and sit to stand transfer, pt unable to ambulate this date. Pt is currently unsafe to return to prior living arraingements. Pt would benefit from continued acute PT to address deficits.  Therapy Prognosis: Good  Decision Making: Medium Complexity  Requires PT Follow-Up: Yes  Activity Tolerance  Activity Tolerance: Patient limited by fatigue;Patient limited by endurance;Other (comment)  Activity Tolerance Comments: Pt reports beign limited by fatigue, dizziness in standing.     Plan   Physical Therapy Plan  General Plan:  (5-6x/wk)  Current Treatment Recommendations: Strengthening, Balance training, Functional mobility training, Transfer training, Endurance training, Gait training, Stair training, Neuromuscular re-education, Home exercise program, Safety education & training, Patient/Caregiver education & training, Equipment evaluation, education,    Balance  Posture: Fair  Sitting - Static: Fair;+  Sitting - Dynamic: +;Fair  Standing - Static: Fair  Standing - Dynamic: Fair;-  Comments: Standing balance assessed w/ RW.  Seated exercises- hip flexion x10 bilaterally, LAQs x10 bilaterally   AM-PAC - Mobility    AM-PAC Basic Mobility - Inpatient   How much help is needed turning from your back to your side while in a flat bed without using bedrails?: A Little  How much help is needed moving from lying on your back to sitting on the side of a flat bed without using bedrails?: A Little  How much help is needed moving to and from a bed to a chair?: Total  How much help is needed standing up from a chair using your arms?: Total  How much help is needed walking in hospital room?: Total  How much help is needed climbing 3-5 steps with a railing?: Total  AM-PAC Inpatient Mobility Raw Score : 10  AM-PAC Inpatient T-Scale Score : 32.29  Mobility Inpatient CMS 0-100% Score: 76.75  Mobility Inpatient CMS G-Code Modifier : CL    Goals  Short Term Goals  Time Frame for Short Term Goals: 14 visits  Short Term Goal 1: Pt will be Max in all bed mobility tasks  Short Term Goal 2: Pt will be Max in transfers  Short Term Goal 3: Pt will amb 300' Max w/ RW or least restrictive device  Short Term Goal 4: Pt will negotiate 1 step Max w/ BUE assist  Additional Goals?: No       Education  Patient Education  Education Given To: Patient  Education Provided: Role of Therapy;Plan of Care  Education Provided Comments: Increased time spent educating patient on importance of mobility  Education Method: Verbal;Demonstration  Barriers to Learning: None  Education Outcome: Verbalized understanding      Therapy Time   Individual Concurrent Group Co-treatment   Time In 0917         Time Out 1000         Minutes 43         Timed Code Treatment Minutes: 38 Minutes       Edna Thakur, PT

## 2024-01-23 NOTE — DISCHARGE SUMMARY
[facility]  Office: [phone]  Lorenzo Sosa DO, Tanner Angulo DO, Quintin Donnelly DO, Mikael Tomas DO, Klae Garcia MD, Jennie Guerrier MD, Marie Abdi MD, Radha Shah MD,  Bernard Corrales MD, Ulices Moura MD, Debra Burns MD,  Papa Hogue DO, Abbi Stevens MD, Darion Dumont MD, Todd Sosa DO, Bre Carlos MD,  William Billingsley DO, Alessandra Bernardo MD, Jamia Bay MD, Cyndi Olivia MD, Dom Ruiz MD,  Wilbert Velasquez MD, Mariama Richards MD, Annita Yao MD, Michelle Ortiz MD, Aguilar Vigil MD, Bishop Mcgee MD, Luis Cook DO, Getachew Oseguera DO, Luna Steward MD,  Jeremiah Charlton MD, Shirley Waterhouse, CNP,  Twyla Elena, CNP, Tico Taylor, CNP,  Sara Marinelli, STEVEN, Suzette Liz, CNP, Bonnie Bruno, CNP, Marjorie Samaniego CNP, Izabela Ayala CNP, Meghann Olivia, CNP, Viviane Mcpherson, PA-C, Kanwal Nichols PA-C, Mitzi Quintana, CNP, Amina Meyers, CNS, Jacquie Erickson, CNP, Arlette Castellano CNP, Tracy Schwab, CNP         [facility]   IN-PATIENT SERVICE   [facility]    Discharge Summary     Patient ID: Jose Maria Ge  :  1955   MRN: [id number]     ACCOUNT:  [id number]   Patient's PCP: No primary care provider on file.  Admit Date: 2024   Discharge Date: 2024     Length of Stay: 4  Code Status:  Full Code  Admitting Physician: Tanner Angulo DO  Discharge Physician: Bishop Mcgee MD     Active Discharge Diagnoses:     Hospital Problem Lists:  Principal Problem:    CHRIS (acute kidney injury) ([facility])  Active Problems:    Acute urinary retention    Hypertension    Hydronephrosis    Obstructive uropathy    Staley catheter in place    Bacteremia due to Klebsiella pneumoniae    PSA elevated    Acute cystitis    Troponin level elevated    Hypocalcemia    First degree AV block    Chronic anticoagulation: Eliquis for DVT/PE    Anemia, normocytic normochromic    History of pulmonary embolism    History of deep venous  signed by   Bishop Mcgee MD  1/23/2024  3:57 PM      Thank you Dr. Joseph primary care provider on file. for the opportunity to be involved in this patient's care.

## 2024-01-23 NOTE — PROGRESS NOTES
Trey Rizvi Santacroce, Khan, Mostafa, Buck, Murtagh Jr  Urology Progress Note     Subjective:   Staley draining well  AFVSS  No issues overnight  Clear yellow urine  Patient has no complaints currently  Eating breakfast    Cr continues to improve 1.3 (1.7)      Patient Vitals for the past 24 hrs:   BP Temp Temp src Pulse Resp SpO2   01/23/24 0747 (!) 144/76 98.3 °F (36.8 °C) Oral 72 16 100 %   01/23/24 0451 -- -- -- -- 16 --   01/22/24 2101 -- -- -- -- 16 --   01/22/24 1942 109/61 97.7 °F (36.5 °C) Oral 81 16 100 %       Intake/Output Summary (Last 24 hours) at 1/23/2024 0847  Last data filed at 1/23/2024 0717  Gross per 24 hour   Intake --   Output 2400 ml   Net -2400 ml       Recent Labs     01/21/24  0419 01/22/24  0521 01/23/24  0619   WBC 11.8* 9.4 7.2   HGB 7.4* 7.9* 7.6*   HCT 24.5* 25.6* 25.7*   MCV 89.4 89.2 91.5    356 297     Recent Labs     01/21/24  0419 01/22/24  0521 01/23/24  0619   * 137 135   K 4.2 4.2 4.1    105 102   CO2 24 22 27   PHOS  --  3.3 2.9   BUN 28* 22 17   CREATININE 2.6* 1.7* 1.3*       No results for input(s): \"COLORU\", \"PHUR\", \"LABCAST\", \"WBCUA\", \"RBCUA\", \"MUCUS\", \"TRICHOMONAS\", \"YEAST\", \"BACTERIA\", \"CLARITYU\", \"SPECGRAV\", \"LEUKOCYTESUR\", \"UROBILINOGEN\", \"BILIRUBINUR\", \"BLOODU\" in the last 72 hours.    Invalid input(s): \"NITRATE\", \"GLUCOSEUKETONESUAMORPHOUS\"    Additional Lab/culture results: None    Physical Exam:   AAOx3  NAD  Unlabored breathing  Normal rate  Abdomen soft, appropriately tender to palpation, nondistended  : Staley in place draining clear yellow urine  No calf tenderness to palpation     Interval Imaging Findings: None    Impression:   68 y.o. male   Prostatomegaly -seen on CT abdomen pelvis  History of severe high-volume urinary retention  Malpositioned Staley catheter -Staley catheter tip easily falls into prostatic urethra. Required multiple repositioning.  Bilateral hydroureteronephrosis -likely secondary to severely distended  bladder  Postrenal CHRIS -creatinine 3.9 on arrival, baseline 0.9.  Due to severely distended bladder and postrenal obstructive uropathy.    Plan:   1/22/23 - Keys malpositioned, Urology hubbed keys catheter, deflated Keys catheter balloon, then reinflated Keys catheter balloon with 25 mL of sterile water. Maintain at least 25 mL of sterile water in keys catheter balloon.  Patient has a large prostate for which the catheter tip may slip.  Thus, we will recommend the the Keys catheter be secured in place and catheter does not get moved around a whole lot  Trend creatinine, replete electrolytes as needed  Continue flomax daily  Follow-up on urine and blood cultures and treat with culture sensitive antibiotics  Continue Keys catheter  Patient will need to follow-up outpatient with his primary urologist Dr. Stratton to discuss bladder outlet reduction procedure    No further recommendations from urological standpoint. Maintain Keys with 25cc in balloon upon DC.    Delicia Wallis DO  PGY-4,Urology  8:47 AM 1/23/2024

## 2024-01-24 LAB
MICROORGANISM SPEC CULT: NORMAL
MICROORGANISM SPEC CULT: NORMAL
SERVICE CMNT-IMP: NORMAL
SERVICE CMNT-IMP: NORMAL
SPECIMEN DESCRIPTION: NORMAL
SPECIMEN DESCRIPTION: NORMAL

## 2024-01-25 ENCOUNTER — APPOINTMENT (OUTPATIENT)
Dept: CT IMAGING | Age: 69
End: 2024-01-25
Payer: COMMERCIAL

## 2024-01-25 ENCOUNTER — HOSPITAL ENCOUNTER (EMERGENCY)
Age: 69
Discharge: HOME | End: 2024-01-25
Attending: EMERGENCY MEDICINE
Payer: COMMERCIAL

## 2024-01-25 VITALS
SYSTOLIC BLOOD PRESSURE: 125 MMHG | OXYGEN SATURATION: 100 % | DIASTOLIC BLOOD PRESSURE: 58 MMHG | WEIGHT: 309 LBS | RESPIRATION RATE: 14 BRPM | HEIGHT: 75 IN | HEART RATE: 76 BPM | TEMPERATURE: 98.1 F | BODY MASS INDEX: 38.42 KG/M2

## 2024-01-25 DIAGNOSIS — N30.01 ACUTE CYSTITIS WITH HEMATURIA: ICD-10-CM

## 2024-01-25 DIAGNOSIS — R31.0 GROSS HEMATURIA: Primary | ICD-10-CM

## 2024-01-25 LAB
ALBUMIN SERPL-MCNC: 3 G/DL (ref 3.5–5.2)
ALBUMIN/GLOB SERPL: 0.7 {RATIO} (ref 1–2.5)
ALP SERPL-CCNC: 74 U/L (ref 40–129)
ALT SERPL-CCNC: 10 U/L (ref 5–41)
ANION GAP SERPL CALCULATED.3IONS-SCNC: 11 MMOL/L (ref 9–17)
AST SERPL-CCNC: 13 U/L
BACTERIA URNS QL MICRO: ABNORMAL
BASOPHILS # BLD: 0.1 K/UL (ref 0–0.2)
BASOPHILS NFR BLD: 1 % (ref 0–2)
BILIRUB SERPL-MCNC: 0.4 MG/DL (ref 0.3–1.2)
BILIRUB UR QL STRIP: NEGATIVE
BUN SERPL-MCNC: 11 MG/DL (ref 8–23)
CALCIUM SERPL-MCNC: 8.4 MG/DL (ref 8.6–10.4)
CHARACTER UR: ABNORMAL
CHLORIDE SERPL-SCNC: 104 MMOL/L (ref 98–107)
CLARITY UR: CLEAR
CO2 SERPL-SCNC: 24 MMOL/L (ref 20–31)
COLOR UR: ABNORMAL
CREAT SERPL-MCNC: 1.1 MG/DL (ref 0.7–1.2)
EOSINOPHIL # BLD: 0.2 K/UL (ref 0–0.4)
EOSINOPHILS RELATIVE PERCENT: 3 % (ref 1–4)
EPI CELLS #/AREA URNS HPF: ABNORMAL /HPF (ref 0–5)
ERYTHROCYTE [DISTWIDTH] IN BLOOD BY AUTOMATED COUNT: 15.3 % (ref 12.5–15.4)
GFR SERPL CREATININE-BSD FRML MDRD: >60 ML/MIN/1.73M2
GLUCOSE SERPL-MCNC: 88 MG/DL (ref 70–99)
GLUCOSE UR STRIP-MCNC: NEGATIVE MG/DL
HCT VFR BLD AUTO: 26.1 % (ref 41–53)
HGB BLD-MCNC: 8.6 G/DL (ref 13.5–17.5)
HGB UR QL STRIP.AUTO: ABNORMAL
INR PPP: 1.3
KETONES UR STRIP-MCNC: NEGATIVE MG/DL
LEUKOCYTE ESTERASE UR QL STRIP: ABNORMAL
LYMPHOCYTES NFR BLD: 1 K/UL (ref 1–4.8)
LYMPHOCYTES RELATIVE PERCENT: 15 % (ref 24–44)
MCH RBC QN AUTO: 27.6 PG (ref 26–34)
MCHC RBC AUTO-ENTMCNC: 32.9 G/DL (ref 31–37)
MCV RBC AUTO: 83.9 FL (ref 80–100)
MONOCYTES NFR BLD: 0.2 K/UL (ref 0.1–1.2)
MONOCYTES NFR BLD: 4 % (ref 2–11)
NEUTROPHILS NFR BLD: 77 % (ref 36–66)
NEUTS SEG NFR BLD: 5.2 K/UL (ref 1.8–7.7)
NITRITE UR QL STRIP: NEGATIVE
PH UR STRIP: 6.5 [PH] (ref 5–8)
PLATELET # BLD AUTO: 326 K/UL (ref 140–450)
PMV BLD AUTO: 7.3 FL (ref 6–12)
POTASSIUM SERPL-SCNC: 4 MMOL/L (ref 3.7–5.3)
PROT SERPL-MCNC: 7.4 G/DL (ref 6.4–8.3)
PROT UR STRIP-MCNC: ABNORMAL MG/DL
PROTHROMBIN TIME: 13.3 SEC (ref 9.4–12.6)
RBC # BLD AUTO: 3.11 M/UL (ref 4.5–5.9)
RBC #/AREA URNS HPF: ABNORMAL /HPF (ref 0–2)
SODIUM SERPL-SCNC: 139 MMOL/L (ref 135–144)
SP GR UR STRIP: 1.02 (ref 1–1.03)
UROBILINOGEN UR STRIP-ACNC: NORMAL EU/DL (ref 0–1)
WBC #/AREA URNS HPF: ABNORMAL /HPF (ref 0–5)
WBC OTHER # BLD: 6.6 K/UL (ref 3.5–11)

## 2024-01-25 PROCEDURE — 6360000002 HC RX W HCPCS: Performed by: EMERGENCY MEDICINE

## 2024-01-25 PROCEDURE — 99284 EMERGENCY DEPT VISIT MOD MDM: CPT

## 2024-01-25 PROCEDURE — 96365 THER/PROPH/DIAG IV INF INIT: CPT

## 2024-01-25 PROCEDURE — 85610 PROTHROMBIN TIME: CPT

## 2024-01-25 PROCEDURE — 6370000000 HC RX 637 (ALT 250 FOR IP): Performed by: EMERGENCY MEDICINE

## 2024-01-25 PROCEDURE — 2580000003 HC RX 258: Performed by: EMERGENCY MEDICINE

## 2024-01-25 PROCEDURE — 80053 COMPREHEN METABOLIC PANEL: CPT

## 2024-01-25 PROCEDURE — 74176 CT ABD & PELVIS W/O CONTRAST: CPT

## 2024-01-25 PROCEDURE — 81001 URINALYSIS AUTO W/SCOPE: CPT

## 2024-01-25 PROCEDURE — 85025 COMPLETE CBC W/AUTO DIFF WBC: CPT

## 2024-01-25 RX ORDER — CEPHALEXIN 500 MG/1
500 CAPSULE ORAL 4 TIMES DAILY
Qty: 28 CAPSULE | Refills: 0 | Status: SHIPPED | OUTPATIENT
Start: 2024-01-25 | End: 2024-02-01

## 2024-01-25 RX ORDER — ACETAMINOPHEN 500 MG
1000 TABLET ORAL ONCE
Status: COMPLETED | OUTPATIENT
Start: 2024-01-25 | End: 2024-01-25

## 2024-01-25 RX ADMIN — ACETAMINOPHEN 1000 MG: 500 TABLET ORAL at 16:52

## 2024-01-25 RX ADMIN — CEFTRIAXONE SODIUM 1000 MG: 1 INJECTION, POWDER, FOR SOLUTION INTRAMUSCULAR; INTRAVENOUS at 15:18

## 2024-01-25 ASSESSMENT — ENCOUNTER SYMPTOMS
DIARRHEA: 0
BACK PAIN: 0
BLOOD IN STOOL: 0
SORE THROAT: 0
WHEEZING: 0
CONSTIPATION: 0
TROUBLE SWALLOWING: 0
ABDOMINAL PAIN: 0
SHORTNESS OF BREATH: 0
NAUSEA: 0
VOMITING: 0

## 2024-01-25 ASSESSMENT — PAIN - FUNCTIONAL ASSESSMENT: PAIN_FUNCTIONAL_ASSESSMENT: NONE - DENIES PAIN

## 2024-01-25 NOTE — ED PROVIDER NOTES
[facility]  eMERGENCY dEPARTMENT eNCOUnter      Pt Name: Jose Maria Ge  MRN: [id number]  Birthdate 1955  Date of evaluation: 1/25/2024      CHIEF COMPLAINT       Chief Complaint   Patient presents with    Hematuria     Pt brought by squad from [place] d/t hematuria-onset 3 hours ago  Pt has a keys in place for a prostate surgery he will be having on feb 2nd         HISTORY OF PRESENT ILLNESS    Jose Maria Ge is a 68 y.o. male who presents with gross hematuria he was brought in from assisted living at [facility] due to gross hematuria he is scheduled for prostate surgery in the February 2 he has a large prostate he had problems with urinary retention and has had a chronic Keys since [place] time there is been no fevers no chills no nausea no vomiting  He has had vascular surgery on the ninth of this month after undergoing a thrombectomy for a massive saddle PE and is on Eliquis    REVIEW OF SYSTEMS         Review of Systems   Constitutional:  Negative for chills and fever.   HENT:  Negative for congestion, dental problem, sore throat and trouble swallowing.    Eyes:  Negative for visual disturbance.   Respiratory:  Negative for shortness of breath and wheezing.    Cardiovascular:  Negative for chest pain, palpitations and leg swelling.   Gastrointestinal:  Negative for abdominal pain, blood in stool, constipation, diarrhea, nausea and vomiting.   Genitourinary:  Positive for difficulty urinating, dysuria and hematuria. Negative for testicular pain.   Musculoskeletal:  Negative for back pain, joint swelling and neck pain.   Skin:  Negative for rash.   Neurological:  Negative for dizziness, syncope, weakness and headaches.   Hematological:  Negative for adenopathy. Does not bruise/bleed easily.   Psychiatric/Behavioral:  Negative for confusion and suicidal ideas.          PAST MEDICAL HISTORY    has a past medical history of Acute kidney failure (HCC), Atrioventricular block,  first degree, Cognitive communication deficit, Essential (primary) hypertension, Gout, Lymphedema, Muscle weakness, and Unspecified eustachian tube disorder, unspecified ear.    SURGICAL HISTORY      has a past surgical history that includes vascular surgery (Right, 1/9/2024).    CURRENT MEDICATIONS       Previous Medications    APIXABAN (ELIQUIS) 5 MG TABS TABLET    Take 1 tablet by mouth 2 times daily    ATORVASTATIN (LIPITOR) 40 MG TABLET    Take 1 tablet by mouth nightly    CARVEDILOL (COREG) 6.25 MG TABLET    Take 1 tablet by mouth 2 times daily (with meals)    HYOSCYAMINE (LEVSIN/SL) 125 MCG SUBLINGUAL TABLET    Place 1 tablet under the tongue every 4 hours as needed for Cramping    LEVOFLOXACIN (LEVAQUIN) 250 MG TABLET    Take 1 tablet by mouth daily for 5 doses    MIDODRINE (PROAMATINE) 10 MG TABLET    Take 1 tablet by mouth as needed (sbp less then 90 and not responding to ivf)    ONDANSETRON (ZOFRAN-ODT) 4 MG DISINTEGRATING TABLET    Take 1 tablet by mouth every 8 hours as needed for Nausea or Vomiting    OXYBUTYNIN (DITROPAN-XL) 5 MG EXTENDED RELEASE TABLET    Take 1 tablet by mouth nightly as needed (bladder spasms)    PANTOPRAZOLE (PROTONIX) 40 MG TABLET    Take 1 tablet by mouth every morning (before breakfast)    POLYETHYLENE GLYCOL (GLYCOLAX) 17 G PACKET    Take 1 packet by mouth daily as needed for Constipation    TAMSULOSIN (FLOMAX) 0.4 MG CAPSULE    Take 1 capsule by mouth daily       ALLERGIES     has No Known Allergies.    FAMILY HISTORY     has no family status information on file.      family history is not on file.    SOCIAL HISTORY      reports that he does not drink alcohol and does not use drugs.    PHYSICAL EXAM     INITIAL VITALS:  height is 1.905 m (6' 3\") and weight is 140.2 kg (309 lb) (abnormal). His oral temperature is 98.1 °F (36.7 °C). His blood pressure is 125/58 (abnormal) and his pulse is 76. His respiration is 14 and oxygen saturation is 100%.        Physical

## 2024-01-25 NOTE — ED NOTES
Phoned [facility] to arrange transport for pt back to [facility]- was told to fax face sheet & medical necessity & they would get back with me

## 2024-01-25 NOTE — ED NOTES
Phoned pts brother clotilde & sister in law markos per pt to make aware that pt is here at [facility]-will call back with updates on POC

## 2024-01-25 NOTE — ED NOTES
Phoned [facility] to arrange transport for pt back to [facility]- they refused transport d/t pt weight because it would take two of their squads

## 2024-01-26 NOTE — PROGRESS NOTES
Physician Progress Note      PATIENT:               TERI WLIBURN  CSN #:                  [id number]  :                       1955  ADMIT DATE:       2024 4:44 PM  DISCH DATE:        2024 5:01 PM  RESPONDING  PROVIDER #:        Bishop WHELAN MD          QUERY TEXT:    Pt admitted with CHRIS and sepsis secondary to complicated UTI noted in H&P.  Pt   noted to have \"severely distended bladder with hydronephrosis bilaterally and   malpositioned Staley catheter.  Tip of catheter in prostatic urethra.\" per   urology consult on  and, \"Urinary tract infection associated with   indwelling urethral catheter, subsequent encounter.\" in ER Documentation on   . If possible, please document in the progress notes and discharge summary   if you are evaluating and / or treating any of the following:    The medical record reflects the following:  Risk Factors: History of urinary retention, BPH, Pyelonephritis, CHRIS,   prostatomegaly  Clinical Indicators: intermittent hematuria, UA-moderate hemoglobin 3+ protein   positive nitrates large leukocytes, severely distended bladder on admission  Treatment: Urology consult for Staley malpositioning, CBI with clamp trials, CT   abdomen pelvis, started Flomax 0.4 mg daily, orders to discharge patient with   Staley, outpatient follow-up for obstructive uropathy ordered.    Thank you,  Sirena Del Toro RN, BSN CDS  [email or web address]  Options provided:  -- Sepsis related to Staley catheter  -- Sepsis unrelated to Staley catheter  -- Other - I will add my own diagnosis  -- Disagree - Not applicable / Not valid  -- Disagree - Clinically unable to determine / Unknown  -- Refer to Clinical Documentation Reviewer    PROVIDER RESPONSE TEXT:    This patient has sepsis related to Staley catheter.    Query created by: Sirena Del Toro on 2024 2:29 PM      Electronically signed by:  Bishop WHELAN MD 2024 4:36 PM

## 2024-01-28 ENCOUNTER — HOSPITAL ENCOUNTER (EMERGENCY)
Age: 69
Discharge: HOME | End: 2024-01-28
Attending: EMERGENCY MEDICINE
Payer: COMMERCIAL

## 2024-01-28 ENCOUNTER — HOSPITAL ENCOUNTER (EMERGENCY)
Age: 69
Discharge: ANOTHER HOSPITAL | End: 2024-01-28
Attending: STUDENT IN AN ORGANIZED HEALTH CARE EDUCATION/TRAINING PROGRAM
Payer: COMMERCIAL

## 2024-01-28 VITALS
HEIGHT: 75 IN | SYSTOLIC BLOOD PRESSURE: 132 MMHG | DIASTOLIC BLOOD PRESSURE: 76 MMHG | BODY MASS INDEX: 38.42 KG/M2 | TEMPERATURE: 97.8 F | RESPIRATION RATE: 16 BRPM | WEIGHT: 309 LBS | OXYGEN SATURATION: 99 % | HEART RATE: 79 BPM

## 2024-01-28 VITALS
OXYGEN SATURATION: 100 % | RESPIRATION RATE: 20 BRPM | TEMPERATURE: 98.4 F | HEART RATE: 80 BPM | SYSTOLIC BLOOD PRESSURE: 154 MMHG | DIASTOLIC BLOOD PRESSURE: 81 MMHG

## 2024-01-28 DIAGNOSIS — R33.8 BENIGN PROSTATIC HYPERPLASIA WITH URINARY RETENTION: Primary | ICD-10-CM

## 2024-01-28 DIAGNOSIS — R33.8 ACUTE URINARY RETENTION: Primary | ICD-10-CM

## 2024-01-28 DIAGNOSIS — R31.9 HEMATURIA, UNSPECIFIED TYPE: ICD-10-CM

## 2024-01-28 DIAGNOSIS — R31.0 GROSS HEMATURIA: ICD-10-CM

## 2024-01-28 DIAGNOSIS — N40.1 BENIGN PROSTATIC HYPERPLASIA WITH URINARY RETENTION: Primary | ICD-10-CM

## 2024-01-28 LAB
ANION GAP SERPL CALCULATED.3IONS-SCNC: 9 MMOL/L (ref 9–17)
BASOPHILS # BLD: 0 K/UL (ref 0–0.2)
BASOPHILS NFR BLD: 0 % (ref 0–2)
BUN SERPL-MCNC: 12 MG/DL (ref 8–23)
CALCIUM SERPL-MCNC: 8.2 MG/DL (ref 8.6–10.4)
CHLORIDE SERPL-SCNC: 103 MMOL/L (ref 98–107)
CO2 SERPL-SCNC: 25 MMOL/L (ref 20–31)
CREAT SERPL-MCNC: 1.2 MG/DL (ref 0.7–1.2)
EOSINOPHIL # BLD: 0.2 K/UL (ref 0–0.4)
EOSINOPHILS RELATIVE PERCENT: 3 % (ref 1–4)
ERYTHROCYTE [DISTWIDTH] IN BLOOD BY AUTOMATED COUNT: 15.4 % (ref 12.5–15.4)
GFR SERPL CREATININE-BSD FRML MDRD: >60 ML/MIN/1.73M2
GLUCOSE SERPL-MCNC: 109 MG/DL (ref 70–99)
HCT VFR BLD AUTO: 23.4 % (ref 41–53)
HGB BLD-MCNC: 7.6 G/DL (ref 13.5–17.5)
LYMPHOCYTES NFR BLD: 1 K/UL (ref 1–4.8)
LYMPHOCYTES RELATIVE PERCENT: 15 % (ref 24–44)
MCH RBC QN AUTO: 27.1 PG (ref 26–34)
MCHC RBC AUTO-ENTMCNC: 32.5 G/DL (ref 31–37)
MCV RBC AUTO: 83.6 FL (ref 80–100)
MONOCYTES NFR BLD: 0.4 K/UL (ref 0.1–1.2)
MONOCYTES NFR BLD: 5 % (ref 2–11)
NEUTROPHILS NFR BLD: 77 % (ref 36–66)
NEUTS SEG NFR BLD: 5.2 K/UL (ref 1.8–7.7)
PLATELET # BLD AUTO: 267 K/UL (ref 140–450)
PMV BLD AUTO: 7.6 FL (ref 6–12)
POTASSIUM SERPL-SCNC: 3.7 MMOL/L (ref 3.7–5.3)
RBC # BLD AUTO: 2.8 M/UL (ref 4.5–5.9)
SODIUM SERPL-SCNC: 137 MMOL/L (ref 135–144)
WBC OTHER # BLD: 6.8 K/UL (ref 3.5–11)

## 2024-01-28 PROCEDURE — 99285 EMERGENCY DEPT VISIT HI MDM: CPT

## 2024-01-28 PROCEDURE — 6370000000 HC RX 637 (ALT 250 FOR IP): Performed by: EMERGENCY MEDICINE

## 2024-01-28 PROCEDURE — 51702 INSERT TEMP BLADDER CATH: CPT

## 2024-01-28 PROCEDURE — 6370000000 HC RX 637 (ALT 250 FOR IP): Performed by: STUDENT IN AN ORGANIZED HEALTH CARE EDUCATION/TRAINING PROGRAM

## 2024-01-28 PROCEDURE — 85025 COMPLETE CBC W/AUTO DIFF WBC: CPT

## 2024-01-28 PROCEDURE — 80048 BASIC METABOLIC PNL TOTAL CA: CPT

## 2024-01-28 PROCEDURE — 36415 COLL VENOUS BLD VENIPUNCTURE: CPT

## 2024-01-28 PROCEDURE — 99283 EMERGENCY DEPT VISIT LOW MDM: CPT

## 2024-01-28 PROCEDURE — 51798 US URINE CAPACITY MEASURE: CPT

## 2024-01-28 RX ORDER — LIDOCAINE HYDROCHLORIDE 20 MG/ML
JELLY TOPICAL ONCE
Status: COMPLETED | OUTPATIENT
Start: 2024-01-28 | End: 2024-01-28

## 2024-01-28 RX ORDER — ACETAMINOPHEN 500 MG
1000 TABLET ORAL ONCE
Status: COMPLETED | OUTPATIENT
Start: 2024-01-28 | End: 2024-01-28

## 2024-01-28 RX ADMIN — LIDOCAINE HYDROCHLORIDE: 20 JELLY TOPICAL at 03:36

## 2024-01-28 RX ADMIN — ACETAMINOPHEN 1000 MG: 500 TABLET ORAL at 15:55

## 2024-01-28 ASSESSMENT — PAIN SCALES - GENERAL: PAINLEVEL_OUTOF10: 6

## 2024-01-28 ASSESSMENT — PAIN - FUNCTIONAL ASSESSMENT: PAIN_FUNCTIONAL_ASSESSMENT: NONE - DENIES PAIN

## 2024-01-28 NOTE — ED NOTES
Pt resting on cot, alert, oriented, no distress noted at this time. Pt states urology came to bedside about an hour ago and said he would come back. Pt continues to have no drainage from his keys catheter. Pt denies any pain. Vitals remains stable. Pt denies any current needs.

## 2024-01-28 NOTE — ED NOTES
Pt growing impatient. Pt states he wants to know what is going on, why he is still waiting to see the urologist. Pt states he is starting to experience abdominal discomfort.   Dr. Anaya notified, she reached out to urology, they will be coming down.

## 2024-01-28 NOTE — ED NOTES
Urology was able to just advance the pt keys, irrigated the keys, no blood noted at this time, no clots. Pt draining large amounts of dark brown urine. Urology resident states if facility has any additional issues with catheter, they will need to advance the keys as it is positional due to prostate.   Brother at bedside.

## 2024-01-28 NOTE — ED PROVIDER NOTES
[facility] ED     Emergency Department     Faculty Attestation        I performed a history and physical examination of the patient and discussed management with the resident. I reviewed the resident’s note and agree with the documented findings and plan of care. Any areas of disagreement are noted on the chart. I was personally present for the key portions of any procedures. I have documented in the chart those procedures where I was not present during the key portions. I have reviewed the emergency nurses triage note. I agree with the chief complaint, past medical history, past surgical history, allergies, medications, social and family history as documented unless otherwise noted below.  For Physician Assistant/ Nurse Practitioner cases/documentation I have personally evaluated this patient and have completed at least one if not all key elements of the E/M (history, physical exam, and MDM). Additional findings are as noted.      Vital Signs: BP: (!) 145/93  Pulse: 79  Respirations: 16  Temp: 98.4 °F (36.9 °C) SpO2: 96 %  PCP:  No primary care provider on file.  Note Started: 1/28/24, 9:26 AM EST    Pertinent Comments:     Patient is a 68-year-old male history of relatively recent bilateral pulmonary embolisms requiring mechanical thrombectomy by vascular surgery and anticoagulated on Eliquis.   Patient has had BPH in the past requiring Staley catheter for urinary retention.   Last time this occurred after Eliquis he did have clots formed and obstruction occurred at that time.   Same sort of thing has happened and was seen at [place] last night with smaller Staley catheter placed and clots intermittently obstructing the catheter.   Patient is here for urology consultation that was arranged prior to transfer.   Patient in no distress at this time with some mild drainage of the catheter that is bloody with some clots.        Critical 
unspecified type          DISPOSITION / PLAN     DISPOSITION Decision To Discharge 01/28/2024 02:37:22 PM      PATIENT REFERRED TO:  [facility] AT [facility]  [address] [id number] [phone]  Schedule an appointment as soon as possible for a visit       Hero Yang MD  [address]  [address]  [facility] [address]  [phone]    Schedule an appointment as soon as possible for a visit       [facility]  [address] [id number] [phone]    As needed, If symptoms worsen      DISCHARGE MEDICATIONS:  New Prescriptions    No medications on file       Tanvi Anaya MD  Emergency Medicine Resident    (Please note that portions of this note were completed with a voice recognition program.  Efforts were made to edit the dictations but occasionally words are mis-transcribed.)

## 2024-01-28 NOTE — ED PROVIDER NOTES
time.    MDM        REASSESSMENT          CRITICAL CARE TIME       CONSULTS:  None    PROCEDURES:  Unless otherwise noted below, none     Procedures      FINAL IMPRESSION      1. Acute urinary retention    2. Gross hematuria          DISPOSITION/PLAN   DISPOSITION Decision To Transfer 01/28/2024 05:19:44 AM      PATIENT REFERRED TO:  No follow-up provider specified.    DISCHARGE MEDICATIONS:  New Prescriptions    No medications on file     Controlled Substances Monitoring:          No data to display                (Please note that portions of this note were completed with a voice recognition program.  Efforts were made to edit the dictations but occasionally words are mis-transcribed.)    Mckay Gutierrez DO (electronically signed)  Attending Emergency Physician            Mckay Gutierrez DO  01/28/24 0600

## 2024-01-28 NOTE — ED NOTES
Attempts to call report to [facility] - no one available to take report and \"will call back\". Transport crew has picked up patient and left - report to transport team given. No output urine via keys bag noted.

## 2024-01-28 NOTE — ED NOTES
Mr Ge [birth year] indwelling Staley catheter since late December, on Eliquis for saddle PE, developed large number of clots in the Staley and now is obstructed.  16 Serbian catheter changed to 22 Serbian and unable to drain the remaining urine, now 500 mL, due to clots.  Urology has been paged.  ED to ED.    Accepted by Dr. Glez

## 2024-01-28 NOTE — ED NOTES
Second call made to [facility] answering service.  DR Tiburcio flanagand. Awaiting call back

## 2024-01-28 NOTE — ED NOTES
[facility] updated on results and plan of care. Social work arranging transport back to facility will call and update [place] on transport ETA.

## 2024-01-28 NOTE — DISCHARGE INSTRUCTIONS
You were seen here due to urinary retention.  Urology came to bedside and inserted the Staley catheter deeper which caused it to drain.    You need to call and schedule follow-up appointment with urology as well as your primary care provider for soon as possible.    Return to the emergency department immediately if you experience worsening symptoms, develop any new symptoms, or if you have any other concerns.

## 2024-01-28 NOTE — ED NOTES
Pt to ed via EMS, transfer from [facility] for urology to see.   Pt lives at [facility], has had urinary retention for the past several months and states he has had a keys in place for the past 1.5-2 months. Pt has enlarged prostate had a 16 fr keys in place that was changed out for a 22 Fr coude that was placed easily at [place] but no urine output. Pt has hematuria. Bladder scan at [place] noted 535 ml via bladder scanner. Pt has enlarged prostate is followed by Dr. Stratton with urology at [facility] and is having procedure in [facility].   Pt denies any pain at this time. Pt is alert, oriented, speaking in full, complete sentences.   Bladder scan preformed upon pt arrival, 340 ml  urine detected in bladder, no output in keys catheter at this time.

## 2024-02-08 PROBLEM — N39.0 UTI (URINARY TRACT INFECTION): Status: RESOLVED | Noted: 2024-01-09 | Resolved: 2024-02-08

## 2024-02-09 ENCOUNTER — OFFICE VISIT (OUTPATIENT)
Dept: VASCULAR SURGERY | Age: 69
End: 2024-02-09

## 2024-02-09 VITALS
BODY MASS INDEX: 53.4 KG/M2 | DIASTOLIC BLOOD PRESSURE: 80 MMHG | HEART RATE: 85 BPM | WEIGHT: 272 LBS | OXYGEN SATURATION: 96 % | HEIGHT: 60 IN | RESPIRATION RATE: 16 BRPM | SYSTOLIC BLOOD PRESSURE: 143 MMHG

## 2024-02-09 DIAGNOSIS — Z86.711 HISTORY OF PULMONARY EMBOLISM: Primary | ICD-10-CM

## 2024-02-09 DIAGNOSIS — Z86.718 HISTORY OF DVT OF LOWER EXTREMITY: ICD-10-CM

## 2024-02-09 RX ORDER — POTASSIUM CHLORIDE 750 MG/1
CAPSULE, EXTENDED RELEASE ORAL
COMMUNITY
Start: 2024-02-06

## 2024-02-09 RX ORDER — ASPIRIN 81 MG/1
1 TABLET ORAL DAILY
COMMUNITY
Start: 2019-11-15

## 2024-02-09 RX ORDER — LOSARTAN POTASSIUM AND HYDROCHLOROTHIAZIDE 12.5; 5 MG/1; MG/1
1 TABLET ORAL DAILY
COMMUNITY

## 2024-02-09 RX ORDER — MULTIVITAMIN WITH FOLIC ACID 400 MCG
TABLET ORAL
COMMUNITY
Start: 2024-02-07

## 2024-02-09 RX ORDER — FINASTERIDE 5 MG/1
1 TABLET, FILM COATED ORAL DAILY
COMMUNITY
Start: 2023-01-09

## 2024-02-09 RX ORDER — FERROUS SULFATE 325(65) MG
TABLET ORAL
COMMUNITY
Start: 2024-02-06

## 2024-02-09 RX ORDER — HYDRALAZINE HYDROCHLORIDE 100 MG/1
100 TABLET, FILM COATED ORAL
COMMUNITY
Start: 2023-12-31

## 2024-02-09 RX ORDER — BUMETANIDE 0.5 MG/1
TABLET ORAL
COMMUNITY
Start: 2024-02-06

## 2024-02-09 RX ORDER — GABAPENTIN 100 MG/1
CAPSULE ORAL
COMMUNITY
Start: 2024-02-06

## 2024-02-09 RX ORDER — FUROSEMIDE 20 MG/1
1 TABLET ORAL DAILY
COMMUNITY

## 2024-02-09 RX ORDER — PSEUDOEPHED/ACETAMINOPH/DIPHEN 30MG-500MG
TABLET ORAL
COMMUNITY
Start: 2024-02-06

## 2024-02-09 RX ORDER — ALLOPURINOL 300 MG/1
1 TABLET ORAL DAILY
COMMUNITY
Start: 2020-09-10

## 2024-02-09 NOTE — PROGRESS NOTES
[facility], [facility]  [address]  [place] [address]  Dept: [phone]     Patient: Jose Maria Ge  : 1955  MRN: [id number]  DOS: 2024            HPI:  Jose Maria Ge is a 68 y.o. male who returns to the office regarding right lower extremity DVT which is recurrent and pulmonary embolism which I think is also recurrent.  Recall that he has had recurrent thromboembolic disease for quite some time and had an injury in  requiring orthopedic repair at which time he developed deep vein thrombus.  He has been on and off of anticoagulation for quite some time.  I first met him early this year for a pulmonary embolism which we retrieved with percutaneous thrombectomy.  He has done well from that and remains on Eliquis at this point.  He has had some prostatic urethral obstruction problems for which she needs an operation and he is to see those surgeons soon for an operative date.  When he needs an operation I would be fine with him stopping the anticoagulation after March for the perioperative procedure but he would need to go back on anticoagulation either on postoperative day 1 or 2.    Review of Systems    Vitals:    24 1333   BP: (!) 143/80   Site: Left Upper Arm   Position: Sitting   Cuff Size: Large Adult   Pulse: 85   Resp: 16   SpO2: 96%   Weight: 123.4 kg (272 lb)   Height: 1 m (3' 3.37\")          Physical Exam  He is in a wheelchair.  He is breathing normally.  His vital signs are relatively normal.  He has no shortness of breath.  His right lower extremity is edematous as it has always been with skin changes including brawny erythema and woody induration.  Assessment:  1. History of pulmonary embolism    2. History of DVT of lower extremity          Plan:  At this time we will have him back to the office on a as needed basis.  Once again I have no problems with him going ahead with prostatic

## 2024-05-06 ENCOUNTER — HOSPITAL ENCOUNTER (OUTPATIENT)
Age: 69
Setting detail: SPECIMEN
Discharge: HOME | End: 2024-05-06

## 2024-05-06 LAB
ALBUMIN SERPL-MCNC: 2.9 G/DL (ref 3.5–5.2)
ALP SERPL-CCNC: 100 U/L (ref 40–129)
ALT SERPL-CCNC: 27 U/L (ref 5–41)
ANION GAP SERPL CALCULATED.3IONS-SCNC: 11 MMOL/L (ref 9–17)
AST SERPL-CCNC: 21 U/L
BILIRUB SERPL-MCNC: 0.3 MG/DL (ref 0.3–1.2)
BUN SERPL-MCNC: 13 MG/DL (ref 8–23)
BUN/CREAT SERPL: 13 (ref 9–20)
CALCIUM SERPL-MCNC: 9.2 MG/DL (ref 8.6–10.4)
CHLORIDE SERPL-SCNC: 103 MMOL/L (ref 98–107)
CHOLEST SERPL-MCNC: 128 MG/DL (ref 0–199)
CHOLESTEROL/HDL RATIO: 4
CO2 SERPL-SCNC: 25 MMOL/L (ref 20–31)
CREAT SERPL-MCNC: 1 MG/DL (ref 0.7–1.2)
ERYTHROCYTE [DISTWIDTH] IN BLOOD BY AUTOMATED COUNT: 16.2 % (ref 11.8–14.4)
GFR, ESTIMATED: 81 ML/MIN/1.73M2
GLUCOSE SERPL-MCNC: 103 MG/DL (ref 70–99)
HCT VFR BLD AUTO: 31.6 % (ref 40.7–50.3)
HDLC SERPL-MCNC: 35 MG/DL
HGB BLD-MCNC: 9.2 G/DL (ref 13–17)
LDLC SERPL CALC-MCNC: 79 MG/DL (ref 0–100)
MCH RBC QN AUTO: 25.6 PG (ref 25.2–33.5)
MCHC RBC AUTO-ENTMCNC: 29.1 G/DL (ref 28.4–34.8)
MCV RBC AUTO: 88 FL (ref 82.6–102.9)
NRBC BLD-RTO: 0 PER 100 WBC
PLATELET # BLD AUTO: 388 K/UL (ref 138–453)
PMV BLD AUTO: 9.9 FL (ref 8.1–13.5)
POTASSIUM SERPL-SCNC: 3.8 MMOL/L (ref 3.7–5.3)
PROT SERPL-MCNC: 8.2 G/DL (ref 6.4–8.3)
RBC # BLD AUTO: 3.59 M/UL (ref 4.21–5.77)
SODIUM SERPL-SCNC: 139 MMOL/L (ref 135–144)
TRIGL SERPL-MCNC: 72 MG/DL
VLDLC SERPL CALC-MCNC: 14 MG/DL
WBC OTHER # BLD: 5.8 K/UL (ref 3.5–11.3)

## 2024-05-06 PROCEDURE — 85027 COMPLETE CBC AUTOMATED: CPT

## 2024-05-06 PROCEDURE — 80061 LIPID PANEL: CPT

## 2024-05-06 PROCEDURE — P9603 ONE-WAY ALLOW PRORATED MILES: HCPCS

## 2024-05-06 PROCEDURE — 80053 COMPREHEN METABOLIC PANEL: CPT

## 2024-05-06 PROCEDURE — 36415 COLL VENOUS BLD VENIPUNCTURE: CPT

## 2024-05-08 ENCOUNTER — HOSPITAL ENCOUNTER (OUTPATIENT)
Age: 69
Setting detail: SPECIMEN
Discharge: HOME | End: 2024-05-08

## 2024-05-08 LAB
ALBUMIN SERPL-MCNC: 3 G/DL (ref 3.5–5.2)
ALP SERPL-CCNC: 89 U/L (ref 40–129)
ALT SERPL-CCNC: 14 U/L (ref 5–41)
ANION GAP SERPL CALCULATED.3IONS-SCNC: 13 MMOL/L (ref 9–17)
AST SERPL-CCNC: 14 U/L
BACTERIA URNS QL MICRO: ABNORMAL
BILIRUB SERPL-MCNC: 0.4 MG/DL (ref 0.3–1.2)
BILIRUB UR QL STRIP: NEGATIVE
BUN SERPL-MCNC: 26 MG/DL (ref 8–23)
BUN/CREAT SERPL: 13 (ref 9–20)
CALCIUM SERPL-MCNC: 8.8 MG/DL (ref 8.6–10.4)
CHLORIDE SERPL-SCNC: 101 MMOL/L (ref 98–107)
CLARITY UR: ABNORMAL
CO2 SERPL-SCNC: 23 MMOL/L (ref 20–31)
COLOR UR: ABNORMAL
CREAT SERPL-MCNC: 2 MG/DL (ref 0.7–1.2)
EPI CELLS #/AREA URNS HPF: ABNORMAL /HPF (ref 0–5)
ERYTHROCYTE [DISTWIDTH] IN BLOOD BY AUTOMATED COUNT: 16.5 % (ref 11.8–14.4)
GFR, ESTIMATED: 35 ML/MIN/1.73M2
GLUCOSE SERPL-MCNC: 100 MG/DL (ref 70–99)
GLUCOSE UR STRIP-MCNC: NEGATIVE MG/DL
HCT VFR BLD AUTO: 27.4 % (ref 40.7–50.3)
HGB BLD-MCNC: 8.3 G/DL (ref 13–17)
HGB UR QL STRIP.AUTO: ABNORMAL
KETONES UR STRIP-MCNC: NEGATIVE MG/DL
LEUKOCYTE ESTERASE UR QL STRIP: ABNORMAL
MCH RBC QN AUTO: 26.2 PG (ref 25.2–33.5)
MCHC RBC AUTO-ENTMCNC: 30.3 G/DL (ref 28.4–34.8)
MCV RBC AUTO: 86.4 FL (ref 82.6–102.9)
NITRITE UR QL STRIP: POSITIVE
NRBC BLD-RTO: 0 PER 100 WBC
PH UR STRIP: 6 [PH] (ref 5–8)
PLATELET # BLD AUTO: 296 K/UL (ref 138–453)
PMV BLD AUTO: 9.8 FL (ref 8.1–13.5)
POTASSIUM SERPL-SCNC: 4.2 MMOL/L (ref 3.7–5.3)
PROT SERPL-MCNC: 7.6 G/DL (ref 6.4–8.3)
PROT UR STRIP-MCNC: ABNORMAL MG/DL
RBC # BLD AUTO: 3.17 M/UL (ref 4.21–5.77)
RBC #/AREA URNS HPF: ABNORMAL /HPF (ref 0–2)
SODIUM SERPL-SCNC: 137 MMOL/L (ref 135–144)
SP GR UR STRIP: 1.02 (ref 1–1.03)
UROBILINOGEN UR STRIP-ACNC: NORMAL EU/DL (ref 0–1)
WBC #/AREA URNS HPF: ABNORMAL /HPF (ref 0–5)
WBC OTHER # BLD: 6.4 K/UL (ref 3.5–11.3)

## 2024-05-08 PROCEDURE — 87086 URINE CULTURE/COLONY COUNT: CPT

## 2024-05-08 PROCEDURE — 87077 CULTURE AEROBIC IDENTIFY: CPT

## 2024-05-08 PROCEDURE — 87186 SC STD MICRODIL/AGAR DIL: CPT

## 2024-05-08 PROCEDURE — 87181 SC STD AGAR DILUTION PER AGT: CPT

## 2024-05-08 PROCEDURE — 81001 URINALYSIS AUTO W/SCOPE: CPT

## 2024-05-08 PROCEDURE — 36415 COLL VENOUS BLD VENIPUNCTURE: CPT

## 2024-05-08 PROCEDURE — 80053 COMPREHEN METABOLIC PANEL: CPT

## 2024-05-08 PROCEDURE — 85027 COMPLETE CBC AUTOMATED: CPT

## 2024-05-08 PROCEDURE — P9603 ONE-WAY ALLOW PRORATED MILES: HCPCS

## 2024-05-10 ENCOUNTER — HOSPITAL ENCOUNTER (OUTPATIENT)
Age: 69
Setting detail: SPECIMEN
Discharge: HOME | End: 2024-05-10

## 2024-05-10 LAB
ANION GAP SERPL CALCULATED.3IONS-SCNC: 13 MMOL/L (ref 9–17)
BUN SERPL-MCNC: 37 MG/DL (ref 8–23)
BUN/CREAT SERPL: 11 (ref 9–20)
CALCIUM SERPL-MCNC: 9 MG/DL (ref 8.6–10.4)
CHLORIDE SERPL-SCNC: 99 MMOL/L (ref 98–107)
CO2 SERPL-SCNC: 23 MMOL/L (ref 20–31)
CREAT SERPL-MCNC: 3.4 MG/DL (ref 0.7–1.2)
ERYTHROCYTE [DISTWIDTH] IN BLOOD BY AUTOMATED COUNT: 16.8 % (ref 11.8–14.4)
GFR, ESTIMATED: 19 ML/MIN/1.73M2
GLUCOSE SERPL-MCNC: 95 MG/DL (ref 70–99)
HCT VFR BLD AUTO: 29.5 % (ref 40.7–50.3)
HGB BLD-MCNC: 8.8 G/DL (ref 13–17)
MCH RBC QN AUTO: 26.1 PG (ref 25.2–33.5)
MCHC RBC AUTO-ENTMCNC: 29.8 G/DL (ref 28.4–34.8)
MCV RBC AUTO: 87.5 FL (ref 82.6–102.9)
NRBC BLD-RTO: 0 PER 100 WBC
PLATELET # BLD AUTO: 314 K/UL (ref 138–453)
PMV BLD AUTO: 10.2 FL (ref 8.1–13.5)
POTASSIUM SERPL-SCNC: 3.8 MMOL/L (ref 3.7–5.3)
RBC # BLD AUTO: 3.37 M/UL (ref 4.21–5.77)
SODIUM SERPL-SCNC: 135 MMOL/L (ref 135–144)
WBC OTHER # BLD: 4.5 K/UL (ref 3.5–11.3)

## 2024-05-10 PROCEDURE — 80048 BASIC METABOLIC PNL TOTAL CA: CPT

## 2024-05-10 PROCEDURE — 36415 COLL VENOUS BLD VENIPUNCTURE: CPT

## 2024-05-10 PROCEDURE — P9603 ONE-WAY ALLOW PRORATED MILES: HCPCS

## 2024-05-10 PROCEDURE — 85027 COMPLETE CBC AUTOMATED: CPT

## 2024-05-11 LAB
MICROORGANISM SPEC CULT: ABNORMAL
MICROORGANISM SPEC CULT: ABNORMAL
SERVICE CMNT-IMP: ABNORMAL
SPECIMEN DESCRIPTION: ABNORMAL

## 2024-05-13 ENCOUNTER — HOSPITAL ENCOUNTER (OUTPATIENT)
Age: 69
Setting detail: SPECIMEN
Discharge: HOME | End: 2024-05-13

## 2024-05-17 ENCOUNTER — HOSPITAL ENCOUNTER (OUTPATIENT)
Age: 69
Setting detail: SPECIMEN
Discharge: HOME | End: 2024-05-17

## 2024-05-21 ENCOUNTER — HOSPITAL ENCOUNTER (OUTPATIENT)
Age: 69
Setting detail: SPECIMEN
Discharge: HOME | End: 2024-05-21

## 2024-05-21 LAB
ALBUMIN SERPL-MCNC: 2.6 G/DL (ref 3.5–5.2)
ALP SERPL-CCNC: 69 U/L (ref 40–129)
ALT SERPL-CCNC: 6 U/L (ref 5–41)
ANION GAP SERPL CALCULATED.3IONS-SCNC: 13 MMOL/L (ref 9–17)
AST SERPL-CCNC: 11 U/L
BILIRUB SERPL-MCNC: 0.3 MG/DL (ref 0.3–1.2)
BUN SERPL-MCNC: 14 MG/DL (ref 8–23)
BUN/CREAT SERPL: 10 (ref 9–20)
CALCIUM SERPL-MCNC: 8.1 MG/DL (ref 8.6–10.4)
CHLORIDE SERPL-SCNC: 104 MMOL/L (ref 98–107)
CO2 SERPL-SCNC: 21 MMOL/L (ref 20–31)
CREAT SERPL-MCNC: 1.4 MG/DL (ref 0.7–1.2)
ERYTHROCYTE [DISTWIDTH] IN BLOOD BY AUTOMATED COUNT: 16.5 % (ref 11.8–14.4)
GFR, ESTIMATED: 54 ML/MIN/1.73M2
GLUCOSE SERPL-MCNC: 78 MG/DL (ref 70–99)
HCT VFR BLD AUTO: 24.8 % (ref 40.7–50.3)
HCT VFR BLD AUTO: 29.3 % (ref 40.7–50.3)
HGB BLD-MCNC: 7.6 G/DL (ref 13–17)
HGB BLD-MCNC: 8.8 G/DL (ref 13–17)
MCH RBC QN AUTO: 26 PG (ref 25.2–33.5)
MCHC RBC AUTO-ENTMCNC: 30.6 G/DL (ref 28.4–34.8)
MCV RBC AUTO: 84.9 FL (ref 82.6–102.9)
NRBC BLD-RTO: 0 PER 100 WBC
PLATELET # BLD AUTO: 300 K/UL (ref 138–453)
PMV BLD AUTO: 9.7 FL (ref 8.1–13.5)
POTASSIUM SERPL-SCNC: 3.5 MMOL/L (ref 3.7–5.3)
PROT SERPL-MCNC: 6.8 G/DL (ref 6.4–8.3)
RBC # BLD AUTO: 2.92 M/UL (ref 4.21–5.77)
SODIUM SERPL-SCNC: 138 MMOL/L (ref 135–144)
WBC OTHER # BLD: 5.8 K/UL (ref 3.5–11.3)

## 2024-05-21 PROCEDURE — 85018 HEMOGLOBIN: CPT

## 2024-05-21 PROCEDURE — P9603 ONE-WAY ALLOW PRORATED MILES: HCPCS

## 2024-05-21 PROCEDURE — 80053 COMPREHEN METABOLIC PANEL: CPT

## 2024-05-21 PROCEDURE — 85014 HEMATOCRIT: CPT

## 2024-05-21 PROCEDURE — 36415 COLL VENOUS BLD VENIPUNCTURE: CPT

## 2024-05-21 PROCEDURE — 85027 COMPLETE CBC AUTOMATED: CPT

## 2024-05-23 ENCOUNTER — HOSPITAL ENCOUNTER (OUTPATIENT)
Age: 69
Setting detail: SPECIMEN
Discharge: HOME | End: 2024-05-23

## 2024-05-23 LAB
ANION GAP SERPL CALCULATED.3IONS-SCNC: 12 MMOL/L (ref 9–17)
BUN SERPL-MCNC: 15 MG/DL (ref 8–23)
BUN/CREAT SERPL: 11 (ref 9–20)
CALCIUM SERPL-MCNC: 8.2 MG/DL (ref 8.6–10.4)
CHLORIDE SERPL-SCNC: 98 MMOL/L (ref 98–107)
CO2 SERPL-SCNC: 23 MMOL/L (ref 20–31)
CREAT SERPL-MCNC: 1.4 MG/DL (ref 0.7–1.2)
ERYTHROCYTE [DISTWIDTH] IN BLOOD BY AUTOMATED COUNT: 16.9 % (ref 11.8–14.4)
GFR, ESTIMATED: 54 ML/MIN/1.73M2
GLUCOSE SERPL-MCNC: 100 MG/DL (ref 70–99)
HCT VFR BLD AUTO: 25.9 % (ref 40.7–50.3)
HGB BLD-MCNC: 8 G/DL (ref 13–17)
MCH RBC QN AUTO: 26.2 PG (ref 25.2–33.5)
MCHC RBC AUTO-ENTMCNC: 30.9 G/DL (ref 28.4–34.8)
MCV RBC AUTO: 84.9 FL (ref 82.6–102.9)
NRBC BLD-RTO: 0 PER 100 WBC
PLATELET # BLD AUTO: 309 K/UL (ref 138–453)
PMV BLD AUTO: 9.7 FL (ref 8.1–13.5)
POTASSIUM SERPL-SCNC: 3.4 MMOL/L (ref 3.7–5.3)
RBC # BLD AUTO: 3.05 M/UL (ref 4.21–5.77)
SODIUM SERPL-SCNC: 133 MMOL/L (ref 135–144)
WBC OTHER # BLD: 4.9 K/UL (ref 3.5–11.3)

## 2024-05-23 PROCEDURE — 36415 COLL VENOUS BLD VENIPUNCTURE: CPT

## 2024-05-23 PROCEDURE — 80048 BASIC METABOLIC PNL TOTAL CA: CPT

## 2024-05-23 PROCEDURE — P9603 ONE-WAY ALLOW PRORATED MILES: HCPCS

## 2024-05-23 PROCEDURE — 85027 COMPLETE CBC AUTOMATED: CPT

## 2024-05-30 ENCOUNTER — HOSPITAL ENCOUNTER (OUTPATIENT)
Age: 69
Setting detail: SPECIMEN
Discharge: HOME | End: 2024-05-30

## 2024-05-30 LAB
ANION GAP SERPL CALCULATED.3IONS-SCNC: 15 MMOL/L (ref 9–17)
BUN SERPL-MCNC: 32 MG/DL (ref 8–23)
BUN/CREAT SERPL: 12 (ref 9–20)
CALCIUM SERPL-MCNC: 7.5 MG/DL (ref 8.6–10.4)
CHLORIDE SERPL-SCNC: 100 MMOL/L (ref 98–107)
CO2 SERPL-SCNC: 19 MMOL/L (ref 20–31)
CREAT SERPL-MCNC: 2.7 MG/DL (ref 0.7–1.2)
ERYTHROCYTE [DISTWIDTH] IN BLOOD BY AUTOMATED COUNT: 17 % (ref 11.8–14.4)
GFR, ESTIMATED: 25 ML/MIN/1.73M2
GLUCOSE SERPL-MCNC: 86 MG/DL (ref 70–99)
HCT VFR BLD AUTO: 26.2 % (ref 40.7–50.3)
HGB BLD-MCNC: 8.2 G/DL (ref 13–17)
MCH RBC QN AUTO: 26.4 PG (ref 25.2–33.5)
MCHC RBC AUTO-ENTMCNC: 31.3 G/DL (ref 28.4–34.8)
MCV RBC AUTO: 84.2 FL (ref 82.6–102.9)
NRBC BLD-RTO: 0 PER 100 WBC
PLATELET # BLD AUTO: 315 K/UL (ref 138–453)
PMV BLD AUTO: 10 FL (ref 8.1–13.5)
POTASSIUM SERPL-SCNC: 3.4 MMOL/L (ref 3.7–5.3)
RBC # BLD AUTO: 3.11 M/UL (ref 4.21–5.77)
SODIUM SERPL-SCNC: 134 MMOL/L (ref 135–144)
WBC OTHER # BLD: 3.4 K/UL (ref 3.5–11.3)

## 2024-05-30 PROCEDURE — 36415 COLL VENOUS BLD VENIPUNCTURE: CPT

## 2024-05-30 PROCEDURE — 85027 COMPLETE CBC AUTOMATED: CPT

## 2024-05-30 PROCEDURE — P9603 ONE-WAY ALLOW PRORATED MILES: HCPCS

## 2024-05-30 PROCEDURE — 80048 BASIC METABOLIC PNL TOTAL CA: CPT

## 2024-06-03 ENCOUNTER — HOSPITAL ENCOUNTER (OUTPATIENT)
Age: 69
Setting detail: SPECIMEN
Discharge: HOME | End: 2024-06-03

## 2024-06-04 ENCOUNTER — HOSPITAL ENCOUNTER (OUTPATIENT)
Age: 69
Setting detail: SPECIMEN
Discharge: HOME | End: 2024-06-04

## 2024-06-04 LAB
ANION GAP SERPL CALCULATED.3IONS-SCNC: 17 MMOL/L (ref 9–17)
BUN SERPL-MCNC: 23 MG/DL (ref 8–23)
BUN/CREAT SERPL: 11 (ref 9–20)
CALCIUM SERPL-MCNC: 8 MG/DL (ref 8.6–10.4)
CHLORIDE SERPL-SCNC: 98 MMOL/L (ref 98–107)
CO2 SERPL-SCNC: 19 MMOL/L (ref 20–31)
CREAT SERPL-MCNC: 2.1 MG/DL (ref 0.7–1.2)
ERYTHROCYTE [DISTWIDTH] IN BLOOD BY AUTOMATED COUNT: 17.4 % (ref 11.8–14.4)
GFR, ESTIMATED: 33 ML/MIN/1.73M2
GLUCOSE SERPL-MCNC: 85 MG/DL (ref 70–99)
HCT VFR BLD AUTO: 26.4 % (ref 40.7–50.3)
HGB BLD-MCNC: 7.7 G/DL (ref 13–17)
MCH RBC QN AUTO: 25.9 PG (ref 25.2–33.5)
MCHC RBC AUTO-ENTMCNC: 29.2 G/DL (ref 28.4–34.8)
MCV RBC AUTO: 88.9 FL (ref 82.6–102.9)
NRBC BLD-RTO: 0 PER 100 WBC
PLATELET # BLD AUTO: 248 K/UL (ref 138–453)
PMV BLD AUTO: 9.9 FL (ref 8.1–13.5)
POTASSIUM SERPL-SCNC: 4.1 MMOL/L (ref 3.7–5.3)
RBC # BLD AUTO: 2.97 M/UL (ref 4.21–5.77)
SODIUM SERPL-SCNC: 134 MMOL/L (ref 135–144)
WBC OTHER # BLD: 4 K/UL (ref 3.5–11.3)

## 2024-06-04 PROCEDURE — 80048 BASIC METABOLIC PNL TOTAL CA: CPT

## 2024-06-04 PROCEDURE — 85027 COMPLETE CBC AUTOMATED: CPT

## 2024-06-04 PROCEDURE — P9603 ONE-WAY ALLOW PRORATED MILES: HCPCS

## 2024-06-04 PROCEDURE — 36415 COLL VENOUS BLD VENIPUNCTURE: CPT

## 2024-06-06 ENCOUNTER — HOSPITAL ENCOUNTER (OUTPATIENT)
Age: 69
Setting detail: SPECIMEN
Discharge: HOME | End: 2024-06-06

## 2024-06-06 LAB
ANION GAP SERPL CALCULATED.3IONS-SCNC: 14 MMOL/L (ref 9–17)
BUN SERPL-MCNC: 23 MG/DL (ref 8–23)
BUN/CREAT SERPL: 11 (ref 9–20)
CALCIUM SERPL-MCNC: 7.6 MG/DL (ref 8.6–10.4)
CHLORIDE SERPL-SCNC: 103 MMOL/L (ref 98–107)
CO2 SERPL-SCNC: 19 MMOL/L (ref 20–31)
CREAT SERPL-MCNC: 2.1 MG/DL (ref 0.7–1.2)
ERYTHROCYTE [DISTWIDTH] IN BLOOD BY AUTOMATED COUNT: 17.2 % (ref 11.8–14.4)
GFR, ESTIMATED: 33 ML/MIN/1.73M2
GLUCOSE SERPL-MCNC: 80 MG/DL (ref 70–99)
HCT VFR BLD AUTO: 24.3 % (ref 40.7–50.3)
HGB BLD-MCNC: 7.4 G/DL (ref 13–17)
MCH RBC QN AUTO: 25.9 PG (ref 25.2–33.5)
MCHC RBC AUTO-ENTMCNC: 30.5 G/DL (ref 28.4–34.8)
MCV RBC AUTO: 85 FL (ref 82.6–102.9)
NRBC BLD-RTO: 0 PER 100 WBC
PLATELET # BLD AUTO: 268 K/UL (ref 138–453)
PMV BLD AUTO: 9.5 FL (ref 8.1–13.5)
POTASSIUM SERPL-SCNC: 3.7 MMOL/L (ref 3.7–5.3)
RBC # BLD AUTO: 2.86 M/UL (ref 4.21–5.77)
SODIUM SERPL-SCNC: 136 MMOL/L (ref 135–144)
WBC OTHER # BLD: 4.6 K/UL (ref 3.5–11.3)

## 2024-06-06 PROCEDURE — 80048 BASIC METABOLIC PNL TOTAL CA: CPT

## 2024-06-06 PROCEDURE — 85027 COMPLETE CBC AUTOMATED: CPT

## 2024-06-06 PROCEDURE — 36415 COLL VENOUS BLD VENIPUNCTURE: CPT

## 2024-06-06 PROCEDURE — P9603 ONE-WAY ALLOW PRORATED MILES: HCPCS
